# Patient Record
Sex: FEMALE | Race: BLACK OR AFRICAN AMERICAN | NOT HISPANIC OR LATINO | Employment: FULL TIME | URBAN - METROPOLITAN AREA
[De-identification: names, ages, dates, MRNs, and addresses within clinical notes are randomized per-mention and may not be internally consistent; named-entity substitution may affect disease eponyms.]

---

## 2017-10-02 ENCOUNTER — HOSPITAL ENCOUNTER (EMERGENCY)
Facility: HOSPITAL | Age: 27
Discharge: HOME | End: 2017-10-02
Payer: COMMERCIAL

## 2017-10-02 VITALS
HEART RATE: 83 BPM | TEMPERATURE: 98 F | DIASTOLIC BLOOD PRESSURE: 75 MMHG | RESPIRATION RATE: 20 BRPM | HEIGHT: 65 IN | OXYGEN SATURATION: 100 % | BODY MASS INDEX: 32.49 KG/M2 | SYSTOLIC BLOOD PRESSURE: 120 MMHG | WEIGHT: 195 LBS

## 2017-10-02 DIAGNOSIS — R07.9 CHEST PAIN, UNSPECIFIED TYPE: Primary | ICD-10-CM

## 2017-10-02 DIAGNOSIS — R06.02 SHORTNESS OF BREATH: ICD-10-CM

## 2017-10-02 PROCEDURE — 99283 EMERGENCY DEPT VISIT LOW MDM: CPT

## 2017-10-02 NOTE — ED PROVIDER NOTES
Encounter Date: 10/2/2017       History     Chief Complaint   Patient presents with    Panic Attack     27 year old female presents to ed cc of anxiety attack. Patient states multiple syptoms related to anxiety including chest pain/ change in appetitte/ n/v     Christianne Faria is a 27 y.o. F who  has a past medical history of Asthma..    Patient presents to ED via POV with boyfriend and sister due to chest pain. Patient states symptoms started 3 days ago while driving. She began having chest pain, palpitations, shortness of breath, and facial flushing. Her symptoms resolved, but she presented to an [facility] ED for evaluation. She reports she had labs and x-rays done and was given RX for Zantac for possible GERD. She states the medication helped initially for 2 days, but states today her symptoms returned a few hours ago, described as similar chest pain, palpitations, tingling in her hands and feet, and flushing. She reports being stressed all the time from work. She denies prior anxiety attacks or episodes. She reports major improvement currently, but does endorse mild persistent dull chest/epigastric pain. She reports having a PCP appointment tomorrow but presents to ED requesting an answer for what is causing her symptoms.            Review of patient's allergies indicates:   Allergen Reactions    Eggs [egg derived] Diarrhea     Past Medical History:   Diagnosis Date    Asthma      Past Surgical History:   Procedure Laterality Date    BUNIONECTOMY Right 2009     History reviewed. No pertinent family history.  Social History   Substance Use Topics    Smoking status: Never Smoker    Smokeless tobacco: Never Used    Alcohol use Yes     Review of Systems   Constitutional: Negative for chills and fever.   HENT: Negative for sore throat.    Respiratory: Positive for chest tightness and shortness of breath. Negative for cough.    Cardiovascular: Positive for chest pain.   Gastrointestinal: Positive for abdominal pain  and nausea. Negative for constipation, diarrhea and vomiting.   Genitourinary: Negative for dysuria, frequency and urgency.   Musculoskeletal: Positive for myalgias. Negative for back pain.   Skin: Positive for rash (ear flushing, resolved). Negative for wound.   Neurological: Positive for light-headedness. Negative for dizziness, syncope and weakness.   Hematological: Does not bruise/bleed easily.   Psychiatric/Behavioral: Negative for agitation, behavioral problems and confusion.       Physical Exam     Initial Vitals [10/02/17 1650]   BP Pulse Resp Temp SpO2   (!) 144/75 76 20 98.4 °F (36.9 °C) 100 %      MAP       98         Physical Exam    Nursing note and vitals reviewed.  Constitutional: She appears well-developed and well-nourished. She is not diaphoretic. No distress.   Calm, well-appearing   HENT:   Head: Normocephalic and atraumatic.   Mouth/Throat: Oropharynx is clear and moist.   Eyes: EOM are normal. Pupils are equal, round, and reactive to light.   Neck: No tracheal deviation present.   Cardiovascular: Normal rate, regular rhythm, normal heart sounds and intact distal pulses.   Pulmonary/Chest: Breath sounds normal. No stridor. No respiratory distress. She has no wheezes.   Abdominal: Soft. Bowel sounds are normal. She exhibits no distension and no mass. There is no tenderness.   Musculoskeletal: Normal range of motion. She exhibits no edema.   Neurological: She is alert and oriented to person, place, and time. She has normal strength. No cranial nerve deficit or sensory deficit.   Skin: Skin is warm and dry. Capillary refill takes less than 2 seconds. No pallor.   Psychiatric: Her behavior is normal. Judgment and thought content normal. Her mood appears not anxious. Her affect is blunt. Cognition and memory are normal. She does not exhibit a depressed mood.         ED Course   Procedures  Labs Reviewed - No data to display          Medical Decision Making:   Initial Assessment:   28 yo F PMH asthma  presents to ED after episode of CP, SOB, palpitations, and flushing, that resolved spontaneously prior to arrival.   Differential Diagnosis:   Differential Diagnosis:  ACS/MI, PE, aortic dissection, pneumothorax, cardiac tamponade, pericarditis/myocarditis, pneumonia, infection/abscess, lung mass, trauma/fracture, costochondritis/pleurisy, MSK pain, GERD, biliary disease, pancreatitis  ED Management:  On initial evaluation, vitals WNL and patient well-appearing. Offered patient cardiac workup, including CXR, labs, but patient declined, stating she has had all of those tests dose 3 days ago and did not wish to go through with them again today. She only wants an answer as to what is causing her symptoms. When counseled that during the course of a single ED visit a definite answer was unlikely, the patient requested D/C immediately, prior to obtaining any diagnostics. At this time, the patient is hemodynamically stable, and there is no indication to necessitate medical evaluation/treatment at this time. The patient is alert/oriented and appropriate, and she is capable of making her own medical decisions. She was allowed to leave the ED in favor of scheduled PCP evaluation tomorrow. Patient and family were counseled and given ED return precautions, including return or worsening of symptoms, or any other concerns.                   ED Course      Clinical Impression:   The primary encounter diagnosis was Chest pain, unspecified type. A diagnosis of Shortness of breath was also pertinent to this visit.                           Jefferson Cano MD  10/03/17 0178

## 2017-10-02 NOTE — ED NOTES
APPEARANCE: Alert, oriented and in no acute distress.  CARDIAC: Normal rate and rhythm, no murmur heard.   PERIPHERAL VASCULAR: peripheral pulses present. Normal cap refill. No edema. Warm to touch.    RESPIRATORY:Normal rate and effort, breath sounds clear bilaterally throughout chest. Respirations are equal and unlabored no obvious signs of distress.  GASTRO: soft, bowel sounds normal, no tenderness, no abdominal distention. Mid sternal chest pain and nausea x 4 days   MUSC: Full ROM. No bony tenderness or soft tissue tenderness. No obvious deformity. Bilateral lower extremity numbness  SKIN: Skin is warm and dry, normal skin turgor, mucous membranes moist.  NEURO: 5/5 strength major flexors/extensors bilaterally. Sensory intact to light touch bilaterally. [place] coma scale: eyes open spontaneously-4, oriented & converses-5, obeys commands-6. No neurological abnormalities.   MENTAL STATUS: awake, alert and aware of environment.  EYE: PERRL, both eyes: pupils brisk and reactive to light. Normal size.  ENT: EARS: no obvious drainage. NOSE: no active bleeding.

## 2017-10-02 NOTE — ED TRIAGE NOTES
Pt presents to ED today c/o mid sternal chest pain, nausea , anxiety, light headed , dizziness, and numbness in both legs x 4 days. Pt reports she was seen here in ED on Friday and was given rx for zantac with no relie.

## 2017-10-05 ENCOUNTER — OFFICE VISIT (OUTPATIENT)
Dept: FAMILY MEDICINE | Facility: CLINIC | Age: 27
End: 2017-10-05
Payer: COMMERCIAL

## 2017-10-05 ENCOUNTER — LAB VISIT (OUTPATIENT)
Dept: LAB | Facility: HOSPITAL | Age: 27
End: 2017-10-05
Attending: FAMILY MEDICINE
Payer: COMMERCIAL

## 2017-10-05 VITALS
HEART RATE: 70 BPM | SYSTOLIC BLOOD PRESSURE: 102 MMHG | WEIGHT: 198.19 LBS | BODY MASS INDEX: 33.02 KG/M2 | OXYGEN SATURATION: 97 % | HEIGHT: 65 IN | DIASTOLIC BLOOD PRESSURE: 60 MMHG

## 2017-10-05 DIAGNOSIS — Z01.419 WELL WOMAN EXAM: Primary | ICD-10-CM

## 2017-10-05 DIAGNOSIS — Z01.419 WELL WOMAN EXAM: ICD-10-CM

## 2017-10-05 DIAGNOSIS — K21.9 GASTROESOPHAGEAL REFLUX DISEASE, ESOPHAGITIS PRESENCE NOT SPECIFIED: ICD-10-CM

## 2017-10-05 DIAGNOSIS — F41.9 ANXIETY: ICD-10-CM

## 2017-10-05 DIAGNOSIS — F32.A DEPRESSION, UNSPECIFIED DEPRESSION TYPE: ICD-10-CM

## 2017-10-05 DIAGNOSIS — Z23 NEED FOR INFLUENZA VACCINATION: ICD-10-CM

## 2017-10-05 LAB
CHOLEST SERPL-MCNC: 134 MG/DL
CHOLEST/HDLC SERPL: 4.1 {RATIO}
ESTIMATED AVG GLUCOSE: 97 MG/DL
HBA1C MFR BLD HPLC: 5 %
HDLC SERPL-MCNC: 33 MG/DL
HDLC SERPL: 24.6 %
LDLC SERPL CALC-MCNC: 84 MG/DL
NONHDLC SERPL-MCNC: 101 MG/DL
TRIGL SERPL-MCNC: 85 MG/DL
TSH SERPL DL<=0.005 MIU/L-ACNC: 0.95 UIU/ML

## 2017-10-05 PROCEDURE — 99202 OFFICE O/P NEW SF 15 MIN: CPT | Mod: S$GLB,,, | Performed by: FAMILY MEDICINE

## 2017-10-05 PROCEDURE — 83036 HEMOGLOBIN GLYCOSYLATED A1C: CPT

## 2017-10-05 PROCEDURE — 84443 ASSAY THYROID STIM HORMONE: CPT

## 2017-10-05 PROCEDURE — 99999 PR PBB SHADOW E&M-EST. PATIENT-LVL III: CPT | Mod: PBBFAC,,, | Performed by: FAMILY MEDICINE

## 2017-10-05 PROCEDURE — 99385 PREV VISIT NEW AGE 18-39: CPT | Mod: 25,1D,GY,S$GLB | Performed by: FAMILY MEDICINE

## 2017-10-05 PROCEDURE — 80061 LIPID PANEL: CPT

## 2017-10-05 PROCEDURE — 36415 COLL VENOUS BLD VENIPUNCTURE: CPT | Mod: PO

## 2017-10-05 RX ORDER — SERTRALINE HYDROCHLORIDE 50 MG/1
50 TABLET, FILM COATED ORAL DAILY
Qty: 30 TABLET | Refills: 3 | Status: SHIPPED | OUTPATIENT
Start: 2017-10-05 | End: 2017-11-02 | Stop reason: SDUPTHER

## 2017-10-05 NOTE — ASSESSMENT & PLAN NOTE
Reviewed CBC and Bmp from ED visit  PHQ9: positive for depression  Declined flu and tdap  Lipid panel and Tsh today  zoloft

## 2017-10-05 NOTE — PATIENT INSTRUCTIONS
Treating Anxiety Disorders with Medicine  An anxiety disorder can make you feel nervous or apprehensive, even without a clear reason. In people age 65 and older, generalized anxiety disorder is one of the most commonly diagnosed anxiety disorders. Many times it occurs with depression. Certain anxiety disorders can cause intense feelings of fear or panic. You may even have physical symptoms such as a racing heartbeat, sweating, or dizziness. If you have these feelings, you dont have to suffer anymore. Treatment to help you overcome your fears will likely include therapy (also called counseling). Medicine may also be prescribed to help control your symptoms.    Medicines  Certain medicines may be prescribed to help control your symptoms. So you may feel less anxious. You may also feel able to move forward with therapy. At first, medicines and dosages may need to be adjusted to find what works best for you. Try to be patient. Tell your healthcare provider how a medicine makes you feel. This way, you can work together to find the treatment thats best for you. Keep in mind that medicines can have side effects. Talk with your provider about any side effects that are bothering you. Changing the dose or type of medicine may help. Dont stop taking medicine on your own. That can cause symptoms to come back.  · Anti-anxiety medicine. This medicine eases symptoms and helps you relax. Your healthcare provider will explain when and how to use it. It may be prescribed for use before situations that make you anxious. You may also be told to take medicine on a regular schedule. Anti-anxiety medicine may make you feel a little sleepy or out of it. Dont drive a car or operate machinery while on this medicine, until you know how it affects you.  Caution  Never use alcohol or other drugs with anti-anxiety medicines. This could result in loss of muscular control, sedation, coma, or death. Also, use only the amount of medicine  prescribed for you. If you think you may have taken too much, get emergency care right away.   · Antidepressant medicine. This kind of medicine is often used to treat anxiety, even if you arent depressed. An antidepressant helps balance out brain chemicals. This helps keep anxiety under control. This medicine is taken on a schedule. It takes a few weeks to start working. If you dont notice a change at first, you may just need more time. But if you dont notice results after the first few weeks, tell your provider.  Keep taking medicines as prescribed  Never change your dosage, share or use another person's medicine, or stop taking your medicines without talking to your healthcare provider first. Keep the following in mind:  · Some medicines must be taken on a schedule. Make this part of your daily routine. For instance, always take your pill before brushing your teeth. A pillbox can help you remember if youve taken your medicine each day.  · Medicines are often taken for 6 to 12 months. Your healthcare provider will then evaluate whether you need to stay on them. Many people who have also had therapy may no longer need medicine to manage anxiety.  · You may need to stop taking medicine slowly to give your body time to adjust. When its time to stop, your healthcare provider will tell you more. Remember: Never stop taking your medicine without talking to your provider first.  · If symptoms return, you may need to start taking medicines again. This isnt your fault. Its just the nature of your anxiety disorder.  Special concerns  · Side effects. Medicines may cause side effects. Ask your healthcare provider or pharmacist what you can expect. They may have ideas for avoiding some side effects.  · Sexual problems. Some antidepressants can affect your desire for sex or your ability to have an orgasm. A change in dosage or medicine often solves the problem. If you have a sexual side effect that concerns you, tell your  healthcare provider.  · Addiction. If youve never had a problem with drugs or alcohol, you may not have a problem with medicines used to treat anxiety disorders. But always discuss the medicines with your healthcare provider before taking them. If you have a history of addiction, you may not be able to use certain medicines used to treat anxiety disorders.  · Medicine interactions. Always check with your pharmacist before using any over-the-counter medicines, including herbal supplements.   Date Last Reviewed: 5/1/2017 © 2000-2017 [employer]. [address] [place] [address]. All rights reserved. This information is not intended as a substitute for professional medical care. Always follow your healthcare professional's instructions.    Depression: Tips to Help Yourself  As your healthcare providers help treat your depression, you can also help yourself. Keep in mind that your illness affects you emotionally, physically, mentally, and socially. So full recovery will take time. Take care of your body and your soul, and be patient with yourself as you get better.    Self-care  · Educate yourself. Read about treatment and medicine options. If you have the energy, attend local conferences or support groups. Keep a list of useful websites and helpful books and use them as needed. This illness is not your fault. Dont blame yourself for your depression.  · Manage early symptoms. If you notice symptoms returning, experience triggers, or identify other factors that may lead to a depressive episode, get help as soon as possible. Ask trusted friends and family to monitor your behavior and let you know if they see anything of concern.  · Work with your provider. Find a provider you can trust. Communicate honestly with that person and share information on your treatment for depression and your reaction to medicines.  · Be prepared for a crisis. Know what to do if you experience a crisis. Keep the phone  number of a crisis hotline and know the location of your community's urgent care centers and the closest emergency department.  · Hold off on big decisions. Depression can cloud your judgment. So wait until you feel better before making major life decisions, such as changing jobs, moving, or getting  or .  · Be patient. Recovering from depression is a process. Dont be discouraged if it takes some time to feel better.  · Keep it simple. Depression saps your energy and concentration. So you wont be able to do all the things you used to do. Set small goals and do what you can.  · Be with others. Dont isolate yourself--youll only feel worse. Try to be with other people. And take part in fun activities when you can. Go to a movie, ballgame, [employer] service, or social event. Talk openly with people you can trust. And accept help when its offered.  Take care of your body  People with depression often lose the desire to take care of themselves. That only makes their problems worse. During treatment and afterward, make a point to:  · Exercise. Its a great way to take care of your body. And studies have shown that exercise helps fight depression.  · Avoid drugs and alcohol. These may ease the pain in the short term. But theyll only make your problems worse in the long run.  · Get relief from stress. Ask your healthcare provider for relaxation exercises and techniques to help relieve stress.  · Eat right. A balanced and healthy diet helps keep your body healthy.  Date Last Reviewed: 1/1/2017  © 4960-4336 [employer]. [address] [place] [address], [place] [address]. All rights reserved. This information is not intended as a substitute for professional medical care. Always follow your healthcare professional's instructions.

## 2017-10-05 NOTE — ASSESSMENT & PLAN NOTE
No TV in bedroom  Sleep hygiene   Journal - 3 good things  Every bad day is a good day to journal  Families problems are not your own  Counseling visit in the future  [facility]
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
## 2017-11-02 ENCOUNTER — OFFICE VISIT (OUTPATIENT)
Dept: FAMILY MEDICINE | Facility: CLINIC | Age: 27
End: 2017-11-02
Payer: COMMERCIAL

## 2017-11-02 VITALS
OXYGEN SATURATION: 99 % | HEART RATE: 92 BPM | DIASTOLIC BLOOD PRESSURE: 58 MMHG | HEIGHT: 65 IN | WEIGHT: 198 LBS | BODY MASS INDEX: 32.99 KG/M2 | SYSTOLIC BLOOD PRESSURE: 119 MMHG

## 2017-11-02 DIAGNOSIS — R51.9 ACUTE NONINTRACTABLE HEADACHE, UNSPECIFIED HEADACHE TYPE: ICD-10-CM

## 2017-11-02 DIAGNOSIS — F41.9 ANXIETY: Primary | ICD-10-CM

## 2017-11-02 DIAGNOSIS — F32.A DEPRESSION, UNSPECIFIED DEPRESSION TYPE: ICD-10-CM

## 2017-11-02 PROCEDURE — 99214 OFFICE O/P EST MOD 30 MIN: CPT | Mod: S$GLB,,, | Performed by: FAMILY MEDICINE

## 2017-11-02 PROCEDURE — 99999 PR PBB SHADOW E&M-EST. PATIENT-LVL III: CPT | Mod: PBBFAC,,, | Performed by: FAMILY MEDICINE

## 2017-11-02 RX ORDER — FLUTICASONE PROPIONATE 50 MCG
1 SPRAY, SUSPENSION (ML) NASAL DAILY
Qty: 1 BOTTLE | Refills: 11 | Status: SHIPPED | OUTPATIENT
Start: 2017-11-02 | End: 2017-12-02

## 2017-11-02 RX ORDER — SERTRALINE HYDROCHLORIDE 50 MG/1
50 TABLET, FILM COATED ORAL DAILY
Qty: 90 TABLET | Refills: 1 | Status: SHIPPED | OUTPATIENT
Start: 2017-11-02 | End: 2017-12-21

## 2017-11-02 RX ORDER — OXYMETAZOLINE HCL 0.05 %
1 SPRAY, NON-AEROSOL (ML) NASAL 2 TIMES DAILY
Qty: 30 ML | Refills: 0 | Status: SHIPPED | OUTPATIENT
Start: 2017-11-02 | End: 2017-11-05

## 2017-11-02 NOTE — PROGRESS NOTES
"Subjective:       Patient ID: Christianne Faria is a 27 y.o. female.    Chief Complaint: Follow-up; Concerns About Ocular Health; and Migraine    Patient is following up today for her anxiety. She has been on the zoloft 50 mg. She has been on it for a month. Her anxiety has decreased along with her obsessions  but she has had some sexual side effects. She has been doing the journaling and the reading along with the sleepy hygiene and that has helped. Has only had one panic attack in the last month, and this was manageable. She reports a 50% improvement in her symptoms.     She has had a "migraine" for about 6 days now. She reports that the headache is on her whole head. No trauma. This seems to have started when she was around some children over the weekend. She reports photophobia and phonophobia along with some nausea.         Migraine    This is a new problem. The current episode started in the past 7 days. The problem has been waxing and waning. The pain is located in the vertex and temporal region. The pain quality is similar to prior headaches. Associated symptoms include nausea, neck pain, phonophobia, photophobia and rhinorrhea. Pertinent negatives include no fever, hearing loss, insomnia, visual change, vomiting or weakness. Exacerbated by: light. She has tried NSAIDs for the symptoms. The treatment provided moderate relief. Her past medical history is significant for migraines in the family. There is no history of migraine headaches.   Anxiety   Presents for follow-up visit. Symptoms include depressed mood, irritability and nausea. Patient reports no chest pain, compulsions, confusion, insomnia, nervous/anxious behavior, obsessions or palpitations. The severity of symptoms is interfering with daily activities. The quality of sleep is good.     Compliance with medications is %. Treatment side effects: sexual side effects.     Review of Systems   Constitutional: Positive for irritability. Negative for " activity change, fever and unexpected weight change.   HENT: Positive for rhinorrhea and trouble swallowing. Negative for hearing loss.    Eyes: Positive for photophobia. Negative for discharge and visual disturbance.   Respiratory: Negative for chest tightness and wheezing.    Cardiovascular: Negative for chest pain and palpitations.   Gastrointestinal: Positive for nausea. Negative for blood in stool, constipation, diarrhea and vomiting.   Endocrine: Negative for polydipsia and polyuria.   Genitourinary: Negative for difficulty urinating, dysuria, hematuria and menstrual problem.   Musculoskeletal: Positive for neck pain. Negative for arthralgias and joint swelling.   Neurological: Positive for headaches. Negative for weakness.   Psychiatric/Behavioral: Negative for confusion and dysphoric mood. The patient is not nervous/anxious and does not have insomnia.         Objective:     Vitals:    11/02/17 1401   BP: (!) 119/58   Pulse: 92        Physical Exam   Constitutional: She is oriented to person, place, and time. She appears well-developed and well-nourished.   HENT:   Head: Normocephalic and atraumatic.   Nasal congestion noted BL  Mild pharyngeal erythema without exudate noted   Eyes: Conjunctivae and EOM are normal. Pupils are equal, round, and reactive to light.   Neck: Normal range of motion. Neck supple.   Cardiovascular: Normal rate and regular rhythm.    Pulmonary/Chest: Effort normal and breath sounds normal. No respiratory distress. She has no wheezes. She has no rales.   Abdominal: Soft. She exhibits no distension. There is no tenderness.   Musculoskeletal:   Ttp noted at occiput   Neurological: She is alert and oriented to person, place, and time.   Skin: Skin is warm and dry.   Psychiatric: She has a normal mood and affect. Her behavior is normal. Judgment and thought content normal.   Nursing note and vitals reviewed.      Assessment:       1. Anxiety    2. Depression, unspecified depression type     3. Acute nonintractable headache, unspecified headache type        Plan:       Plan:  Patient with history of Anxiety and depression  Patient reports 50 % improvement  Biggest side effect is the sexual side effect, patient counseled that SSRI's sexual side effects may persist for about 8-12 weeks but then may resolve.   She declines increase in dose to 100 mg but wants to continue with 50 mg for now  Continue No TV in bedroom, Sleep hygiene, Journal - 3 good things, SSRI  No suicidal or homicidal ideations  If side effects persist, can try fluoxetine vs bupropion    Headache appears to be a tension type headache made worse but acute URI  Advised breathing exercises along with NSAIDS/Tylenol for headache  Discussed diagnosis and treatment of URI.  flonase and afrin (3 days only) for URI  Warm teas, salt water gargles  Call in 3 days if symptoms aren't resolving.  Follow up with PCP as needed    Warning signs discussed, patient to call with any further issues or worsening of symptoms.

## 2017-12-21 ENCOUNTER — OFFICE VISIT (OUTPATIENT)
Dept: FAMILY MEDICINE | Facility: CLINIC | Age: 27
End: 2017-12-21
Payer: COMMERCIAL

## 2017-12-21 VITALS
HEIGHT: 65 IN | WEIGHT: 198.44 LBS | BODY MASS INDEX: 33.06 KG/M2 | SYSTOLIC BLOOD PRESSURE: 94 MMHG | DIASTOLIC BLOOD PRESSURE: 60 MMHG | TEMPERATURE: 98 F | OXYGEN SATURATION: 99 % | HEART RATE: 77 BPM

## 2017-12-21 DIAGNOSIS — R44.1 VISUAL HALLUCINATION: Primary | ICD-10-CM

## 2017-12-21 DIAGNOSIS — F32.A DEPRESSION, UNSPECIFIED DEPRESSION TYPE: ICD-10-CM

## 2017-12-21 DIAGNOSIS — F41.9 ANXIETY: ICD-10-CM

## 2017-12-21 PROBLEM — Z23 NEED FOR INFLUENZA VACCINATION: Status: RESOLVED | Noted: 2017-10-05 | Resolved: 2017-12-21

## 2017-12-21 PROBLEM — Z01.419 WELL WOMAN EXAM: Status: RESOLVED | Noted: 2017-10-05 | Resolved: 2017-12-21

## 2017-12-21 PROCEDURE — 99214 OFFICE O/P EST MOD 30 MIN: CPT | Mod: S$GLB,,, | Performed by: FAMILY MEDICINE

## 2017-12-21 PROCEDURE — 99999 PR PBB SHADOW E&M-EST. PATIENT-LVL IV: CPT | Mod: PBBFAC,,, | Performed by: FAMILY MEDICINE

## 2017-12-21 RX ORDER — SERTRALINE HYDROCHLORIDE 100 MG/1
100 TABLET, FILM COATED ORAL DAILY
Qty: 90 TABLET | Refills: 0 | Status: SHIPPED | OUTPATIENT
Start: 2017-12-21 | End: 2018-01-04 | Stop reason: SDUPTHER

## 2017-12-21 RX ORDER — IBUPROFEN 200 MG
200 TABLET ORAL EVERY 6 HOURS PRN
COMMUNITY
End: 2019-01-30
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
## 2017-12-21 NOTE — PATIENT INSTRUCTIONS
If you or someone you know is thinking about suicide, call the National Suicide Prevention Lifeline. 0-476-110-TALK (6128)    No TV in bedroom  Sleep hygiene   Exercise 3 x/week  Make plans at least once a week with a friend  Journal - 3 good things EVERY SINGLE NIGHT  Every bad day is a good day to journal  Families problems are not your own  Counseling visit in the future  Increase SSRI

## 2018-01-04 ENCOUNTER — OFFICE VISIT (OUTPATIENT)
Dept: FAMILY MEDICINE | Facility: CLINIC | Age: 28
End: 2018-01-04
Payer: COMMERCIAL

## 2018-01-04 VITALS
BODY MASS INDEX: 34.31 KG/M2 | HEIGHT: 65 IN | DIASTOLIC BLOOD PRESSURE: 62 MMHG | TEMPERATURE: 98 F | OXYGEN SATURATION: 98 % | SYSTOLIC BLOOD PRESSURE: 90 MMHG | HEART RATE: 94 BPM | WEIGHT: 205.94 LBS

## 2018-01-04 DIAGNOSIS — F32.A DEPRESSION, UNSPECIFIED DEPRESSION TYPE: ICD-10-CM

## 2018-01-04 DIAGNOSIS — F41.9 ANXIETY: ICD-10-CM

## 2018-01-04 PROCEDURE — 99999 PR PBB SHADOW E&M-EST. PATIENT-LVL III: CPT | Mod: PBBFAC,,, | Performed by: FAMILY MEDICINE

## 2018-01-04 PROCEDURE — 99214 OFFICE O/P EST MOD 30 MIN: CPT | Mod: S$GLB,,, | Performed by: FAMILY MEDICINE

## 2018-01-04 RX ORDER — FLUTICASONE PROPIONATE 50 MCG
SPRAY, SUSPENSION (ML) NASAL
COMMUNITY
Start: 2017-12-29 | End: 2019-08-30

## 2018-01-04 RX ORDER — SERTRALINE HYDROCHLORIDE 100 MG/1
100 TABLET, FILM COATED ORAL DAILY
Qty: 90 TABLET | Refills: 1 | Status: SHIPPED | OUTPATIENT
Start: 2018-01-04 | End: 2018-03-26 | Stop reason: SDUPTHER

## 2018-01-04 NOTE — PROGRESS NOTES
Subjective:       Patient ID: Christianne Faria is a 27 y.o. female.    Chief Complaint: Follow-up (Anxiety)    Christianne is a 27 y.o. female who presents today with follow up for dysphoric mood and anxiety. She is feeling better today. She is here with her boyfriend again. She saw her family over christmas including her brother over christmas and her sister on christmas gaby. She went to the park and played [employer] and had a lot of fun. She received a present from her boyfriend for christmas. They also spent new years together and had fun, even though they did not go out. She has been journaling 3-4 x /week and has found it very helpful. She reports an 80% IMPROVEMENT in her symptoms. She is tolerating the 100 mg of zoloft qhs very well. She has increased her ways of expressing herself; she is painting and dancing and finds great enjoyment in both of those things. She is sleeping well. She DENIES any suicidal thoughts or hallucinations currently. She has not made her appointment with psychiatry yet.       Anxiety   Presents for follow-up visit. Symptoms include compulsions, irritability and nervous/anxious behavior. Patient reports no chest pain, confusion, decreased concentration, dizziness, excessive worry, hyperventilation, impotence, insomnia, nausea, obsessions, palpitations, panic, restlessness, shortness of breath (7) or suicidal ideas. Symptoms occur most days. The severity of symptoms is moderate. The quality of sleep is fair. Nighttime awakenings: occasional.     Compliance with medications is %.     Review of Systems   Constitutional: Positive for irritability. Negative for activity change and unexpected weight change.   HENT: Positive for rhinorrhea. Negative for hearing loss and trouble swallowing.    Eyes: Negative for discharge and visual disturbance.   Respiratory: Negative for chest tightness, shortness of breath (7) and wheezing.    Cardiovascular: Negative for chest pain and palpitations.    Gastrointestinal: Negative for blood in stool, constipation, diarrhea, nausea and vomiting.   Endocrine: Negative for polydipsia and polyuria.   Genitourinary: Negative for difficulty urinating, dysuria, hematuria, impotence and menstrual problem.   Musculoskeletal: Negative for arthralgias, joint swelling and neck pain.   Neurological: Negative for dizziness, weakness and headaches.   Psychiatric/Behavioral: Positive for dysphoric mood. Negative for confusion, decreased concentration and suicidal ideas. The patient is nervous/anxious. The patient does not have insomnia.        Objective:     Vitals:    01/04/18 1350   BP: 90/62   Pulse: 94   Temp: 97.6 °F (36.4 °C)        Physical Exam   Constitutional: She is oriented to person, place, and time. She appears well-developed and well-nourished.   HENT:   Head: Normocephalic and atraumatic.   Cardiovascular: Normal rate and regular rhythm.    Pulmonary/Chest: Effort normal and breath sounds normal.   Neurological: She is alert and oriented to person, place, and time.   Psychiatric: She has a normal mood and affect. Her behavior is normal. Judgment and thought content normal.   Nursing note and vitals reviewed.      Assessment:       1. Anxiety    2. Depression, unspecified depression type        Plan:       Patient is presenting for follow up from visit prior to [place] at which time she was states that she has worsening depression/anxiety and visual hallucinations. She reports an 80% improvement in her symptoms today. She is to continue zoloft 100 mg. She is to make an appointment with psych to see if they believe anything else needs to be done given the hallucinations that she had in the past. Boyfriend was with her at the appointment today and verifies the information that was given.     No TV in bedroom  Sleep hygiene   Exercise 3 x/week  Make plans at least once a week with a friend  Journal - 3 good things EVERY SINGLE NIGHT  Every bad day is a good day to  journal  Continue painting/dancing/other ways of expression  Families problems are not your own  Counseling visit in the future  Continue SSRI     Ddx include depression with psychotic features vs bipolar vs schizoaffective vs other. Psych input is appreciated    Anxiety/Depression, unspecified depression type  -     sertraline (ZOLOFT) 100 MG tablet; Take 1 tablet (100 mg total) by mouth once daily.  Dispense: 90 tablet; Refill: 1      35 minutes spent with patient, of which >50% was spent on counseling and coordination of care.

## 2018-01-04 NOTE — PATIENT INSTRUCTIONS
If you or someone you know is thinking about suicide, call the National Suicide Prevention Lifeline. 4-587-218-TALK (9146)     No TV in bedroom  Sleep hygiene   Exercise 3 x/week  Make plans at least once a week with a friend  Journal - 3 good things EVERY SINGLE NIGHT  Every bad day is a good day to journal  Families problems are not your own  Counseling visit in the future  Continue SSRI

## 2018-02-08 ENCOUNTER — OFFICE VISIT (OUTPATIENT)
Dept: FAMILY MEDICINE | Facility: CLINIC | Age: 28
End: 2018-02-08
Payer: COMMERCIAL

## 2018-02-08 VITALS
OXYGEN SATURATION: 97 % | SYSTOLIC BLOOD PRESSURE: 98 MMHG | DIASTOLIC BLOOD PRESSURE: 62 MMHG | HEART RATE: 75 BPM | HEIGHT: 65 IN | WEIGHT: 204.56 LBS | BODY MASS INDEX: 34.08 KG/M2

## 2018-02-08 DIAGNOSIS — F32.A DEPRESSION, UNSPECIFIED DEPRESSION TYPE: ICD-10-CM

## 2018-02-08 DIAGNOSIS — G47.00 INSOMNIA, UNSPECIFIED TYPE: ICD-10-CM

## 2018-02-08 DIAGNOSIS — F41.9 ANXIETY: Primary | ICD-10-CM

## 2018-02-08 PROCEDURE — 99213 OFFICE O/P EST LOW 20 MIN: CPT | Mod: PO | Performed by: FAMILY MEDICINE

## 2018-02-08 PROCEDURE — 3008F BODY MASS INDEX DOCD: CPT | Mod: S$GLB,,, | Performed by: FAMILY MEDICINE

## 2018-02-08 PROCEDURE — 99213 OFFICE O/P EST LOW 20 MIN: CPT | Mod: S$GLB,,, | Performed by: FAMILY MEDICINE

## 2018-02-08 PROCEDURE — 99999 PR PBB SHADOW E&M-EST. PATIENT-LVL III: CPT | Mod: PBBFAC,,, | Performed by: FAMILY MEDICINE

## 2018-02-08 NOTE — PATIENT INSTRUCTIONS
No TV in bedroom  Sleep hygiene   Exercise 3 x/week  Make plans at least once a week with a friend  Journal - 3 good things  Every bad day is a good day to journal  Families problems are not your own  Counseling visit in the future  Continue SSRI

## 2018-02-08 NOTE — PROGRESS NOTES
Subjective:       Patient ID: Christianne Faria is a 27 y.o. female.    Chief Complaint: Follow-up (1 mo) and Anxiety    27-year-old female presents today as follow-up for anxiety.  She appears to be back to her baseline mood and behavior.  She is here today with her boyfriend who is coming to the last 2 appointments with her.  She was seen before Christmas and was feeling very depressed.  Since then her mood has improved and she has continued to have hobbies such as painting that are bringing liam to her life.  She feels like she is happier and is better at making decisions with her counseling and her medications.  Her boyfriend and is in agreement with that.  She still has some irritability.    She is still having some hallucinations.  She states that she is not hearing stings or seeing people but she states that she feels like she sees things move when they're not moving.  Example she gives is that the sink in the room can appear to move to her even though she knows that it is a sink and it is incapable of moving.  She has an appointment with psychiatry already in the books for March 26.      Anxiety   Presents for follow-up visit. Symptoms include insomnia and nervous/anxious behavior. Patient reports no chest pain, confusion, decreased concentration, depressed mood, excessive worry, hyperventilation, irritability, palpitations, panic, restlessness or suicidal ideas. Symptoms occur occasionally. The severity of symptoms is mild. The quality of sleep is fair. Nighttime awakenings: one to two.     Compliance with medications is %.     Review of Systems   Constitutional: Negative for activity change, irritability and unexpected weight change.   HENT: Negative for hearing loss, rhinorrhea and trouble swallowing.    Eyes: Negative for discharge and visual disturbance.   Respiratory: Negative for chest tightness and wheezing.    Cardiovascular: Negative for chest pain and palpitations.   Gastrointestinal: Negative  for blood in stool, constipation, diarrhea and vomiting.   Endocrine: Negative for polydipsia and polyuria.   Genitourinary: Negative for difficulty urinating, dysuria, hematuria and menstrual problem.   Musculoskeletal: Negative for arthralgias and joint swelling.   Neurological: Negative for weakness and headaches.   Psychiatric/Behavioral: Negative for confusion, decreased concentration, dysphoric mood and suicidal ideas. The patient is nervous/anxious and has insomnia.          Objective:     Vitals:    02/08/18 1405   BP: 98/62   Pulse: 75        Physical Exam   Constitutional: She is oriented to person, place, and time. She appears well-developed and well-nourished.   HENT:   Head: Normocephalic and atraumatic.   Nose: Nose normal.   Mouth/Throat: Oropharynx is clear and moist.   Eyes: Conjunctivae and EOM are normal. Pupils are equal, round, and reactive to light.   Neck: Normal range of motion. Neck supple.   Cardiovascular: Normal rate and regular rhythm.    Pulmonary/Chest: Effort normal and breath sounds normal.   Abdominal: Soft.   obese   Neurological: She is alert and oriented to person, place, and time. No cranial nerve deficit. Coordination normal.   Finger to nose intact  Heel to shin intact  Strength and sensation grossly intract BL UE/LE  CN 2-12 grossly intact  PERRLA  Rapid alternating movement intact   Psychiatric: She has a normal mood and affect. Her behavior is normal. Judgment and thought content normal.   Nursing note and vitals reviewed.      Assessment:       1. Anxiety    2. Depression, unspecified depression type    3. Insomnia, unspecified type        Plan:       Patient is presenting as second follow up from visit prior to [place] at which time she was states that she has worsening depression/anxiety and visual hallucinations. She is significantly better today. She is to continue zoloft 100 mg. She was counseled again on the importance of sleep hygiene, deep breathing, reflecting  not reacting, and journaling.     No TV in bedroom  Sleep hygiene   Exercise 3 x/week  Make plans at least once a week with a friend  Journal - 3 good things EVERY SINGLE NIGHT  Every bad day is a good day to journal  Continue painting/dancing/other ways of expression  Families problems are not your own  Counseling visit in the future  Continue SSRI     Ddx include depression with psychotic features vs bipolar vs schizoaffective vs other. Psych input is appreciated    Anxiety    Depression, unspecified depression type    Insomnia, unspecified type            Warning signs discussed, patient to call with any further issues or worsening of symptoms.     Parts of the above note were dictated using a voice dictation software. Please excuse any grammatical or typographical errors.

## 2018-03-26 ENCOUNTER — OFFICE VISIT (OUTPATIENT)
Dept: PSYCHIATRY | Facility: CLINIC | Age: 28
End: 2018-03-26
Payer: COMMERCIAL

## 2018-03-26 VITALS
SYSTOLIC BLOOD PRESSURE: 108 MMHG | DIASTOLIC BLOOD PRESSURE: 64 MMHG | HEART RATE: 78 BPM | BODY MASS INDEX: 34.38 KG/M2 | WEIGHT: 206.56 LBS

## 2018-03-26 DIAGNOSIS — F41.1 GENERALIZED ANXIETY DISORDER: Primary | ICD-10-CM

## 2018-03-26 DIAGNOSIS — F60.5 OBSESSIVE COMPULSIVE PERSONALITY DISORDER: ICD-10-CM

## 2018-03-26 DIAGNOSIS — F41.0 PANIC DISORDER: ICD-10-CM

## 2018-03-26 DIAGNOSIS — F33.41 RECURRENT MAJOR DEPRESSIVE DISORDER, IN PARTIAL REMISSION: ICD-10-CM

## 2018-03-26 PROCEDURE — 99215 OFFICE O/P EST HI 40 MIN: CPT | Mod: S$GLB,,, | Performed by: NURSE PRACTITIONER

## 2018-03-26 PROCEDURE — 99999 PR PBB SHADOW E&M-EST. PATIENT-LVL III: CPT | Mod: PBBFAC,,, | Performed by: NURSE PRACTITIONER

## 2018-03-26 RX ORDER — SERTRALINE HYDROCHLORIDE 100 MG/1
150 TABLET, FILM COATED ORAL DAILY
Qty: 135 TABLET | Refills: 1 | Status: SHIPPED | OUTPATIENT
Start: 2018-03-26 | End: 2018-05-10

## 2018-04-13 ENCOUNTER — TELEPHONE (OUTPATIENT)
Dept: OBSTETRICS AND GYNECOLOGY | Facility: CLINIC | Age: 28
End: 2018-04-13

## 2018-04-13 NOTE — TELEPHONE ENCOUNTER
Attempted to contact pt to inform Dr. Quick will be out of the office until the middle of May. No answer, left detailed message for pt to return call. I rescheduled pt to 5/15 at 11:30am.

## 2018-05-10 ENCOUNTER — OFFICE VISIT (OUTPATIENT)
Dept: FAMILY MEDICINE | Facility: CLINIC | Age: 28
End: 2018-05-10
Payer: COMMERCIAL

## 2018-05-10 VITALS
HEIGHT: 65 IN | WEIGHT: 207.25 LBS | OXYGEN SATURATION: 99 % | HEART RATE: 64 BPM | DIASTOLIC BLOOD PRESSURE: 60 MMHG | SYSTOLIC BLOOD PRESSURE: 98 MMHG | TEMPERATURE: 99 F | BODY MASS INDEX: 34.53 KG/M2

## 2018-05-10 DIAGNOSIS — M54.2 NECK PAIN: ICD-10-CM

## 2018-05-10 DIAGNOSIS — G47.00 INSOMNIA, UNSPECIFIED TYPE: ICD-10-CM

## 2018-05-10 DIAGNOSIS — F41.9 ANXIETY: Primary | ICD-10-CM

## 2018-05-10 DIAGNOSIS — F32.A DEPRESSION, UNSPECIFIED DEPRESSION TYPE: ICD-10-CM

## 2018-05-10 PROCEDURE — 99214 OFFICE O/P EST MOD 30 MIN: CPT | Mod: S$GLB,,, | Performed by: FAMILY MEDICINE

## 2018-05-10 PROCEDURE — 99999 PR PBB SHADOW E&M-EST. PATIENT-LVL IV: CPT | Mod: PBBFAC,,, | Performed by: FAMILY MEDICINE

## 2018-05-10 PROCEDURE — 3008F BODY MASS INDEX DOCD: CPT | Mod: CPTII,S$GLB,, | Performed by: FAMILY MEDICINE

## 2018-05-10 RX ORDER — TIZANIDINE 2 MG/1
4 TABLET ORAL NIGHTLY PRN
Qty: 30 TABLET | Refills: 0 | Status: SHIPPED | OUTPATIENT
Start: 2018-05-10 | End: 2018-05-20

## 2018-05-10 RX ORDER — SERTRALINE HYDROCHLORIDE 100 MG/1
100 TABLET, FILM COATED ORAL DAILY
Qty: 90 TABLET | Refills: 1 | Status: SHIPPED | OUTPATIENT
Start: 2018-05-10 | End: 2018-07-09 | Stop reason: SDUPTHER

## 2018-05-10 NOTE — PROGRESS NOTES
Subjective:       Patient ID: Christianne Faria is a 27 y.o. female.    Chief Complaint: Follow-up (3 mos ) and Anxiety    Christianne is a 27 y.o. female who presents today for follow up on her anxiety, depression, insomnia, and for continue neck pain and shoulder pain.     She is smiling and happy during the appointment today.  She states that she is at the same job, however it is going better.  Even though her job can still be frustrating, her perception has changed and she is enjoying it more.  She continues to have good support.  Her boyfriend, who is usually here with her is at a physical for his work but they are still very involved each other's lives.  She has been journaling nightly and finds that to be very helpful.  She makes plans with friends.  She has been exercising, however only about one time per week.  She goes biking for about 60 minutes.  She has increased confidence.  She has no suicidal or homicidal ideations.    She went to see psychiatry but did not apparently develop a good rapport.  Zoloft was increased to 150 mg by psychiatry but she did not think that she needed to have her dose increased so she was continued on 100 mg.  She is very happy with her symptoms at the 100 mg dose.  She is having minimal sexual side effects although she does have some decreased desire.  Her partner is understanding of this and she does not currently treat wish to change medication    Neck pain is also persisting, for about 2 years now. Unchanged since onset. Sometimes it is in her shoulders.       Anxiety   Presents for follow-up visit. Symptoms include depressed mood, excessive worry, insomnia and nervous/anxious behavior. Patient reports no chest pain, compulsions, confusion, decreased concentration, dry mouth, hyperventilation, irritability, nausea, obsessions, palpitations, panic, restlessness, shortness of breath or suicidal ideas. Symptoms occur occasionally. The severity of symptoms is moderate. The quality of  sleep is fair.     Compliance with medications is %.     Review of Systems   Constitutional: Negative for activity change, irritability and unexpected weight change.   HENT: Negative for hearing loss, rhinorrhea and trouble swallowing.    Eyes: Negative for discharge and visual disturbance.   Respiratory: Negative for chest tightness, shortness of breath and wheezing.    Cardiovascular: Negative for chest pain and palpitations.   Gastrointestinal: Negative for blood in stool, constipation, diarrhea, nausea and vomiting.   Endocrine: Negative for polydipsia and polyuria.   Genitourinary: Negative for difficulty urinating, dysuria, hematuria and menstrual problem.   Musculoskeletal: Positive for neck pain. Negative for arthralgias and joint swelling.   Neurological: Negative for weakness and headaches.   Psychiatric/Behavioral: Negative for confusion, decreased concentration, dysphoric mood and suicidal ideas. The patient is nervous/anxious and has insomnia.        Objective:     Vitals:    05/10/18 1608   BP: 98/60   Pulse: 64   Temp: 98.8 °F (37.1 °C)        Physical Exam   Constitutional: She is oriented to person, place, and time. She appears well-developed and well-nourished. No distress.   HENT:   Head: Normocephalic and atraumatic.   Eyes: Conjunctivae are normal.   Neck: Normal range of motion. Neck supple.   Cardiovascular: Normal rate and regular rhythm.    Pulmonary/Chest: Effort normal and breath sounds normal.   Abdominal: Soft. There is no tenderness.   Musculoskeletal: She exhibits tenderness.   TTP noted at occiput and right first rib   Neurological: She is alert and oriented to person, place, and time.   Skin: She is not diaphoretic.   Psychiatric: She has a normal mood and affect. Her behavior is normal. Judgment and thought content normal.   Nursing note and vitals reviewed.      Assessment:       1. Anxiety    2. Depression, unspecified depression type    3. Insomnia, unspecified type    4.  Neck pain        Plan:       Very pleasant 27 year old female presents today for follow up on her anxiety, depression and insomnia. She is stable and no longer having any issues with hallucinations. She is smiling and happy during the visit today. She is to continue zoloft 100 mg. She was counseled again on the importance of sleep hygiene, deep breathing, reflecting not reacting, and journaling    No TV in bedroom  Sleep hygiene   Exercise 3 x/week  Make plans at least once a week with a friend  Journal - 3 good things EVERY SINGLE NIGHT  Every bad day is a good day to journal  Continue painting/dancing/other ways of expression  Families problems are not your own  Counseling visit in the future  Continue SSRI      Anxiety/Depression, unspecified depression type  -     sertraline (ZOLOFT) 100 MG tablet; Take 1 tablet (100 mg total) by mouth once daily.  Dispense: 90 tablet; Refill: 1    Insomnia, unspecified type  Journal  zoloft qhs  Exercise daily    Neck pain  Given TTP of the right first rib and the right occiput, likely MSK in nature. However, given length of symptoms, will do xr cervical spine.   PT, Tizanidine QHSPRN  Follow up PRN for this  -     Ambulatory Referral to Physical/Occupational Therapy  -     tiZANidine (ZANAFLEX) 2 MG tablet; Take 2 tablets (4 mg total) by mouth nightly as needed.  Dispense: 30 tablet; Refill: 0  -     X-Ray Cervical Spine Complete 5 view; Future; Expected date: 05/10/2018    Warning signs discussed, patient to call with any further issues or worsening of symptoms.     Parts of the above note were dictated using a voice dictation software. Please excuse any grammatical or typographical errors.

## 2018-05-10 NOTE — PATIENT INSTRUCTIONS
No TV in bedroom  Sleep hygiene   Exercise 3 x/week  Make plans at least once a week with a friend  Journal - 3 good things  Every bad day is a good day to journal  Families problems are not your own  Counseling visit in the future  Continue SSRI    Follow up for physical in october

## 2018-05-15 ENCOUNTER — TELEPHONE (OUTPATIENT)
Dept: FAMILY MEDICINE | Facility: CLINIC | Age: 28
End: 2018-05-15

## 2018-05-15 ENCOUNTER — OFFICE VISIT (OUTPATIENT)
Dept: OBSTETRICS AND GYNECOLOGY | Facility: CLINIC | Age: 28
End: 2018-05-15
Payer: COMMERCIAL

## 2018-05-15 ENCOUNTER — HOSPITAL ENCOUNTER (OUTPATIENT)
Dept: RADIOLOGY | Facility: HOSPITAL | Age: 28
Discharge: HOME | End: 2018-05-15
Attending: FAMILY MEDICINE
Payer: COMMERCIAL

## 2018-05-15 VITALS
DIASTOLIC BLOOD PRESSURE: 64 MMHG | BODY MASS INDEX: 34.27 KG/M2 | WEIGHT: 205.94 LBS | SYSTOLIC BLOOD PRESSURE: 110 MMHG

## 2018-05-15 DIAGNOSIS — Z30.09 ENCOUNTER FOR OTHER GENERAL COUNSELING OR ADVICE ON CONTRACEPTION: ICD-10-CM

## 2018-05-15 DIAGNOSIS — M54.2 NECK PAIN: ICD-10-CM

## 2018-05-15 DIAGNOSIS — Z01.419 WELL WOMAN EXAM WITH ROUTINE GYNECOLOGICAL EXAM: Primary | ICD-10-CM

## 2018-05-15 DIAGNOSIS — R10.2 PELVIC PAIN IN FEMALE: ICD-10-CM

## 2018-05-15 DIAGNOSIS — M26.12: Primary | ICD-10-CM

## 2018-05-15 DIAGNOSIS — Z12.4 SCREENING FOR CERVICAL CANCER: ICD-10-CM

## 2018-05-15 PROCEDURE — 88175 CYTOPATH C/V AUTO FLUID REDO: CPT | Performed by: PATHOLOGY

## 2018-05-15 PROCEDURE — 88141 CYTOPATH C/V INTERPRET: CPT | Mod: ,,, | Performed by: PATHOLOGY

## 2018-05-15 PROCEDURE — 99395 PREV VISIT EST AGE 18-39: CPT | Mod: S$GLB,,, | Performed by: OBSTETRICS & GYNECOLOGY

## 2018-05-15 PROCEDURE — 99999 PR PBB SHADOW E&M-EST. PATIENT-LVL III: CPT | Mod: PBBFAC,,, | Performed by: OBSTETRICS & GYNECOLOGY

## 2018-05-15 PROCEDURE — 72050 X-RAY EXAM NECK SPINE 4/5VWS: CPT | Mod: 26,,, | Performed by: RADIOLOGY

## 2018-05-15 PROCEDURE — 72050 X-RAY EXAM NECK SPINE 4/5VWS: CPT | Mod: TC,FY

## 2018-05-15 RX ORDER — NORETHINDRONE 5 MG/1
5 TABLET ORAL DAILY
Qty: 30 TABLET | Refills: 11 | Status: SHIPPED | OUTPATIENT
Start: 2018-05-15 | End: 2018-05-28 | Stop reason: HOSPADM

## 2018-05-15 NOTE — TELEPHONE ENCOUNTER
I tried to call the patient twice, no response. Her XR showed: Lucent lesion partially visualized in the mandible near the symphysis, measuring at least 1.7 cm in size.    It is unclear what exactly this is. I have ordered a CT for the patient. Please try to contact her and get this scheduled thanks.

## 2018-05-15 NOTE — PROGRESS NOTES
GYNECOLOGY OFFICE NOTE    Reason for visit: annual    HPI: Pt is a 27 y.o.  female  who presents for annual and evaluation of pelvic pain. Sharp stabbing pain rated 9/10. No alleviating factors. Worsened with intercourse. X 5 months. Not really associated with cycles. Gets pain with defecation or with a full bladder. Denies issues with constipation.  Cycle: menarche- 12, Interval- Q month, Duration- 3-4 days, Flow- normal, reports dysmenorrhea (not alleviated with ibuprofen). She is sexually active.  She uses oral progesterone-only contraceptive for contraception. States ANDRES make her violently  She does not desire STI screening. She denies vaginal discharge.  Last pap: , denies hx of abnormal. The use of hormonal contraception has been fully discussed with the patient. We discussed all options including OCPs, transdermal patches, vaginal ring, Depo Provera injections, Implanon, and IUD. Warnings about anticipated minor side effects such as breakthrough spotting, nausea, breast tenderness, weight changes, acne, headaches, etc were given.  She has been told of the more serious potential side effects such as MI, stroke, and deep vein thrombosis, all of which are very unlikely.  She has been asked to report any signs of such serious problems immediately. The need for additional protection, such as a condom, to prevent exposure to sexually transmitted diseases has also been discussed- the patient has been clearly reminded that no hormonal contraceptive method can protect her against diseases such as HIV and others. She understands and wishes to continue micronor. States tried COCs the past which made her feel bad.      Past Medical History:   Diagnosis Date    Anxiety     Asthma     Depression     Hx of psychiatric care     Obsessive-compulsive disorder     Psychiatric problem     Therapy        Past Surgical History:   Procedure Laterality Date    BUNIONECTOMY Right        Family History    Problem Relation Age of Onset    Anxiety disorder Mother     Hypertension Father     Depression Brother     Breast cancer Neg Hx     Colon cancer Neg Hx     Ovarian cancer Neg Hx        Social History   Substance Use Topics    Smoking status: Never Smoker    Smokeless tobacco: Never Used    Alcohol use Yes      Comment: once a month       OB History    Para Term  AB Living   0 0 0 0 0 0   SAB TAB Ectopic Multiple Live Births   0 0 0 0               Current Outpatient Prescriptions   Medication Sig    fluticasone (FLONASE) 50 mcg/actuation nasal spray     ibuprofen (ADVIL,MOTRIN) 200 MG tablet Take 200 mg by mouth every 6 (six) hours as needed for Pain.    multivitamin capsule Take 1 capsule by mouth once daily.    sertraline (ZOLOFT) 100 MG tablet Take 1 tablet (100 mg total) by mouth once daily.    tiZANidine (ZANAFLEX) 2 MG tablet Take 2 tablets (4 mg total) by mouth nightly as needed.    norethindrone (AYGESTIN) 5 mg Tab Take 1 tablet (5 mg total) by mouth once daily.     No current facility-administered medications for this visit.        Allergies: Eggs [egg derived]     /64   Wt 93.4 kg (205 lb 14.6 oz)   LMP 2018 (Exact Date)   BMI 34.27 kg/m²     ROS:  GENERAL: Denies fever or chills.   SKIN: Denies rash or lesions.   HEAD: Denies head injury or headache.   CHEST: Denies chest pain or shortness of breath.   CARDIOVASCULAR: Denies palpitations or chest pain.   ABDOMEN: No abdominal pain, constipation, diarrhea, nausea, vomiting or rectal bleeding.   URINARY: No dysuria, hematuria, or burning on urination.  REPRODUCTIVE: See HPI.   BREASTS: Denies pain, lumps, or nipple discharge.   NEUROLOGIC: Denies syncope or weakness.     Physical Exam:  GENERAL: alert, appears stated age and cooperative  CHEST: Normal respiratory effort  HEART: S1 and S2 normal, regular rate and rhythm  NECK: normal appearance, no thyromegaly masses or tenderness  SKIN: no acne, striae,  hirsutism  BREAST EXAM: breasts appear normal, no suspicious masses, no skin or nipple changes or axillary nodes  ABDOMEN: abdomen is soft without significant tenderness, masses, organomegaly or guarding, no hernias noted  EXTERNAL GENITALIA:  normal general appearance  URETHRA: normal urethra, normal urethral meatus  VAGINA:  normal mucosa without prolapse or lesions  CERVIX:  Normal  UTERUS:  Exam limited by habitus  ADNEXA:  normal adnexa in size, nontender and no masses    Diagnosis:  1. Well woman exam with routine gynecological exam    2. Pelvic pain in female    3. Screening for cervical cancer    4. Encounter for other general counseling or advice on contraception        Plan:   1. Annual  2. U/S ordered- could be secondary to endometriosis- we discussed dx and treatment options. Which include provera. Will start trial of aygestin. F/u with me after U/S  3. Pap today      Orders Placed This Encounter    Liquid-based pap smear, screening    norethindrone (AYGESTIN) 5 mg Tab    US OB/GYN Procedure ([facility])       Patient was counseled today on the new ACS guidelines for cervical cytology screening as well as the current recommendations for breast cancer screening. She was counseled to follow up with her PCP for other routine health maintenance. Counseling session lasted approximately 30 minutes, and all her questions were answered.    Follow-up for re-evaluation, ultrasound.      Katharina Quick MD  OB/GYN  Pager: [phone]

## 2018-05-16 NOTE — TELEPHONE ENCOUNTER
I spoke with patient and scheduled CT on 5/24/2018 at 9am. I advised patient that she have to fast four hours before CT and arrive to hours before scheduled appointment time.Patient voiced understating.

## 2018-05-21 ENCOUNTER — PROCEDURE VISIT (OUTPATIENT)
Dept: OBSTETRICS AND GYNECOLOGY | Facility: CLINIC | Age: 28
End: 2018-05-21
Payer: COMMERCIAL

## 2018-05-21 ENCOUNTER — OFFICE VISIT (OUTPATIENT)
Dept: OBSTETRICS AND GYNECOLOGY | Facility: CLINIC | Age: 28
End: 2018-05-21
Payer: COMMERCIAL

## 2018-05-21 VITALS
WEIGHT: 206.38 LBS | BODY MASS INDEX: 34.34 KG/M2 | SYSTOLIC BLOOD PRESSURE: 108 MMHG | DIASTOLIC BLOOD PRESSURE: 60 MMHG

## 2018-05-21 DIAGNOSIS — R10.2 PELVIC PAIN: Primary | ICD-10-CM

## 2018-05-21 DIAGNOSIS — R10.2 PELVIC PAIN IN FEMALE: ICD-10-CM

## 2018-05-21 PROCEDURE — 99212 OFFICE O/P EST SF 10 MIN: CPT | Mod: 25,S$GLB,, | Performed by: OBSTETRICS & GYNECOLOGY

## 2018-05-21 PROCEDURE — 99999 PR PBB SHADOW E&M-EST. PATIENT-LVL II: CPT | Mod: PBBFAC,,, | Performed by: OBSTETRICS & GYNECOLOGY

## 2018-05-21 PROCEDURE — 76830 TRANSVAGINAL US NON-OB: CPT | Mod: S$GLB,,, | Performed by: OBSTETRICS & GYNECOLOGY

## 2018-05-21 PROCEDURE — 3008F BODY MASS INDEX DOCD: CPT | Mod: CPTII,S$GLB,, | Performed by: OBSTETRICS & GYNECOLOGY

## 2018-05-21 NOTE — PROGRESS NOTES
GYNECOLOGY OFFICE NOTE    Reason for visit: U/S follow-up    HPI: Pt is a 27 y.o.  female  who presents for annual and evaluation of pelvic pain. U/S was normal. Discussed possible constipation and increasing fiber in diet or taking supplements. Pt has not yet started aygestin as prescribed.       Past Medical History:   Diagnosis Date    Anxiety     Asthma     Depression     Hx of psychiatric care     Obsessive-compulsive disorder     Psychiatric problem     Therapy        Past Surgical History:   Procedure Laterality Date    BUNIONECTOMY Right 2009       Family History   Problem Relation Age of Onset    Anxiety disorder Mother     Hypertension Father     Depression Brother     Breast cancer Neg Hx     Colon cancer Neg Hx     Ovarian cancer Neg Hx        Social History   Substance Use Topics    Smoking status: Never Smoker    Smokeless tobacco: Never Used    Alcohol use Yes      Comment: once a month       OB History    Para Term  AB Living   0 0 0 0 0 0   SAB TAB Ectopic Multiple Live Births   0 0 0 0               Current Outpatient Prescriptions   Medication Sig    fluticasone (FLONASE) 50 mcg/actuation nasal spray     ibuprofen (ADVIL,MOTRIN) 200 MG tablet Take 200 mg by mouth every 6 (six) hours as needed for Pain.    multivitamin capsule Take 1 capsule by mouth once daily.    norethindrone (AYGESTIN) 5 mg Tab Take 1 tablet (5 mg total) by mouth once daily.    sertraline (ZOLOFT) 100 MG tablet Take 1 tablet (100 mg total) by mouth once daily.     No current facility-administered medications for this visit.        Allergies: Eggs [egg derived]     /60   Wt 93.6 kg (206 lb 5.6 oz)   LMP 2018   BMI 34.34 kg/m²     ROS:  GENERAL: Denies fever or chills.   SKIN: Denies rash or lesions.   HEAD: Denies head injury or headache.   CHEST: Denies chest pain or shortness of breath.   CARDIOVASCULAR: Denies palpitations or chest pain.   ABDOMEN: No abdominal  pain, constipation, diarrhea, nausea, vomiting or rectal bleeding.   URINARY: No dysuria, hematuria, or burning on urination.  REPRODUCTIVE: See HPI.   BREASTS: Denies pain, lumps, or nipple discharge.   NEUROLOGIC: Denies syncope or weakness.     Physical Exam:  GENERAL: alert, appears stated age and cooperative  CHEST: Normal respiratory effort  HEART: S1 and S2 normal, regular rate and rhythm  NECK: normal appearance, no thyromegaly masses or tenderness  SKIN: no acne, striae, hirsutism  Talk only    Diagnosis:  1. Pelvic pain        Plan:   1. Fiber and start aygestin. rtc in 2-3 months for re-evaluation. Discussed possibility of endometriosis and trial of aygestin can help         Patient was counseled today on the new ACS guidelines for cervical cytology screening as well as the current recommendations for breast cancer screening. She was counseled to follow up with her PCP for other routine health maintenance.     Follow-up if symptoms worsen or fail to improve.      Katharina Quick MD  OB/GYN  Pager: [phone]

## 2018-05-24 ENCOUNTER — TELEPHONE (OUTPATIENT)
Dept: OBSTETRICS AND GYNECOLOGY | Facility: CLINIC | Age: 28
End: 2018-05-24

## 2018-05-24 ENCOUNTER — HOSPITAL ENCOUNTER (OUTPATIENT)
Dept: RADIOLOGY | Facility: HOSPITAL | Age: 28
Discharge: HOME | End: 2018-05-24
Attending: FAMILY MEDICINE
Payer: COMMERCIAL

## 2018-05-24 DIAGNOSIS — M26.12: ICD-10-CM

## 2018-05-24 PROCEDURE — 70487 CT MAXILLOFACIAL W/DYE: CPT | Mod: 26,,, | Performed by: RADIOLOGY

## 2018-05-24 PROCEDURE — 25500020 PHARM REV CODE 255: Performed by: FAMILY MEDICINE

## 2018-05-24 PROCEDURE — 70487 CT MAXILLOFACIAL W/DYE: CPT | Mod: TC

## 2018-05-24 RX ADMIN — IOHEXOL 75 ML: 350 INJECTION, SOLUTION INTRAVENOUS at 08:05

## 2018-05-24 NOTE — TELEPHONE ENCOUNTER
Called to inform pt of LSIL pap and need for colpo. Please assist with scheduling.     Katharina Quick MD, FACOG  OB/GYN  Pager: [phone]

## 2018-05-28 ENCOUNTER — PATIENT MESSAGE (OUTPATIENT)
Dept: FAMILY MEDICINE | Facility: CLINIC | Age: 28
End: 2018-05-28

## 2018-05-28 ENCOUNTER — TELEPHONE (OUTPATIENT)
Dept: OTOLARYNGOLOGY | Facility: CLINIC | Age: 28
End: 2018-05-28

## 2018-05-28 DIAGNOSIS — M26.9 JAW ANOMALY: Primary | ICD-10-CM

## 2018-05-28 RX ORDER — ACETAMINOPHEN AND CODEINE PHOSPHATE 120; 12 MG/5ML; MG/5ML
1 SOLUTION ORAL DAILY
Qty: 30 TABLET | Refills: 11 | Status: SHIPPED | OUTPATIENT
Start: 2018-05-28 | End: 2019-05-27 | Stop reason: SDUPTHER

## 2018-05-28 NOTE — TELEPHONE ENCOUNTER
----- Message from Marce Samson sent at 5/28/2018  9:31 AM CDT -----  Contact: self / [phone]  Patient is requesting a call back regarding her birthcontrol. Please advise

## 2018-05-28 NOTE — TELEPHONE ENCOUNTER
Contacted pt regarding previous call to schedule Colpo. Pt states she will have a new insurance next month and would like to wait until her new insurance is active. Advised pt to call next month to schedule procedure. Patient verbalized understanding.

## 2018-05-28 NOTE — TELEPHONE ENCOUNTER
Returned call, pt states she does not like the new ocp aygestin and would like to be put back on micronor.

## 2018-05-28 NOTE — TELEPHONE ENCOUNTER
----- Message from Endy Draper DO sent at 5/28/2018  9:23 AM CDT -----  Thanks! I will place the referral today.   ----- Message -----  From: Irene Burroughs RN  Sent: 5/25/2018   4:38 PM  To: Endy Draper DO     I will call to schedule patient for a consult visit with Dr. Gramajo.     -ALYSON Bonilla    ----- Message -----  From: Tyrel Gramajo MD  Sent: 5/25/2018   4:31 PM  To: Irene Burroughs RN    I'll see her.   ----- Message -----  From: Irene Burroughs RN  Sent: 5/25/2018   2:34 PM  To: Tyrel Gramajo MD    Please advise.  -Josselin    ----- Message -----  From: Endy Draper DO  Sent: 5/25/2018   2:27 PM  To: [facility] Staff    Hi,   I am seeing this patient and incidentally found a lesion in her jaw. A CT was performed and this showed: 2.2 cm predominantly lucent mass in the anterior mandible.  This has the suggestion of internal fat content and most likely represents a benign process such as an intraosseous lipoma.  Other entities such as a odontogenic keratocyst, fibrous dysplasia, or odontogenic ameloblastoma are felt to be unlikely but cannot be entirely excluded.     Would this be something that Dr. Gramajo would want to see in clinic? If so, would he want any further testing done prior to visit?     Patient is asymptomatic.     Thanks in advance for your help.    Endy Draper

## 2018-06-14 ENCOUNTER — OFFICE VISIT (OUTPATIENT)
Dept: OTOLARYNGOLOGY | Facility: CLINIC | Age: 28
End: 2018-06-14
Payer: COMMERCIAL

## 2018-06-14 VITALS
BODY MASS INDEX: 34.85 KG/M2 | WEIGHT: 209.44 LBS | TEMPERATURE: 98 F | DIASTOLIC BLOOD PRESSURE: 62 MMHG | SYSTOLIC BLOOD PRESSURE: 102 MMHG | HEART RATE: 72 BPM

## 2018-06-14 DIAGNOSIS — M27.9 LESION OF MANDIBLE: Primary | ICD-10-CM

## 2018-06-14 PROCEDURE — 99243 OFF/OP CNSLTJ NEW/EST LOW 30: CPT | Mod: S$GLB,,, | Performed by: OTOLARYNGOLOGY

## 2018-06-14 PROCEDURE — 99999 PR PBB SHADOW E&M-EST. PATIENT-LVL III: CPT | Mod: PBBFAC,,, | Performed by: OTOLARYNGOLOGY

## 2018-06-14 NOTE — LETTER
June 20, 2018      Endy Draper, DO  [address] [facility]  [place] [address]           [facility]  [address]  [facility] [address]  Phone: [phone]  Fax: [phone]          Patient: Christianne Faria   MR Number: [id number]   YOB: 1990   Date of Visit: 6/14/2018       Dear Dr. Endy Draper:    Thank you for referring Christianne Faria to me for evaluation. Attached you will find relevant portions of my assessment and plan of care.    If you have questions, please do not hesitate to call me. I look forward to following Christianne Faria along with you.    Sincerely,    Tyrel Gramajo MD    Enclosure  CC:  No Recipients    If you would like to receive this communication electronically, please contact [email or web address] or [phone] to request more information on [facility] access.    For providers and/or their staff who would like to refer a patient to [facility], please contact us through our one-stop-shop provider referral line, [facility], at [phone].    If you feel you have received this communication in error or would no longer like to receive these types of communications, please e-mail [email or web address]

## 2018-06-20 ENCOUNTER — TELEPHONE (OUTPATIENT)
Dept: OTOLARYNGOLOGY | Facility: CLINIC | Age: 28
End: 2018-06-20

## 2018-06-20 PROBLEM — M27.9 LESION OF MANDIBLE: Status: ACTIVE | Noted: 2018-06-20

## 2018-06-20 NOTE — PROGRESS NOTES
Chief Complaint   Patient presents with    consult/ small lump inside cheek       HPI   27 y.o. female presents from Dr. Draper for evaluation of a lesion of the mandible.  This lesion was incidentally noted on CT scan.  No complaints.  No pain, no change in her appearance.  Nod dysphagia. She has not appreciated any lesions of the mouth.     Review of Systems   Constitutional: Negative for fatigue and unexpected weight change.   HENT: Per HPI.  Eyes: Negative for visual disturbance.   Respiratory: Negative for shortness of breath, hemoptysis   Cardiovascular: Negative for chest pain and palpitations.   Musculoskeletal: Negative for decreased ROM, back pain.   Skin: Negative for rash, sunburn, itching.   Neurological: Negative for dizziness and seizures.   Hematological: Negative for adenopathy. Does not bruise/bleed easily.   Endocrine: Negative for rapid weight loss/weight gain, heat/cold intolerance.     Past Medical History   Patient Active Problem List   Diagnosis    Anxiety    Gastroesophageal reflux disease    Depression           Past Surgical History   Past Surgical History:   Procedure Laterality Date    BUNIONECTOMY Right 2009         Family History   Family History   Problem Relation Age of Onset    Anxiety disorder Mother     Hypertension Father     Depression Brother     Breast cancer Neg Hx     Colon cancer Neg Hx     Ovarian cancer Neg Hx            Social History   .  Social History     Social History    Marital status: Single     Spouse name: N/A    Number of children: N/A    Years of education: N/A     Occupational History          Social History Main Topics    Smoking status: Never Smoker    Smokeless tobacco: Never Used    Alcohol use Yes      Comment: once a month    Drug use: No    Sexual activity: Yes     Partners: Male     Birth control/ protection: OCP      Comment: doesn't use protection     Other Topics Concern    Patient Feels They Ought To Cut Down On  Drinking/Drug Use No    Patient Annoyed By Others Criticizing Their Drinking/Drug Use No    Patient Has Felt Bad Or Guilty About Drinking/Drug Use No    Patient Has Had A Drink/Used Drugs As An Eye Opener In [place] No     Social History Narrative    No narrative on file         Allergies   Review of patient's allergies indicates:   Allergen Reactions    Eggs [egg derived] Diarrhea           Physical Exam     Vitals:    06/14/18 1445   BP: 102/62   Pulse: 72   Temp: 97.9 °F (36.6 °C)         Body mass index is 34.85 kg/m².      General: AOx3, NAD   Respiratory:  Symmetric chest rise, normal effort  Nose: No gross nasal septal deviation. Inferior Turbinates WNL bilaterally. No septal perforation. No masses/lesions.   Oral Cavity:  Oral Tongue mobile, no lesions noted. Hard Palate WNL. No buccal or FOM lesions.  Oropharynx:  No masses/lesions of the posterior pharyngeal wall. Tonsillar fossa without lesions. Soft palate without masses. Midline uvula.   Neck: No scars.  No cervical lymphadenopathy, thyromegaly or thyroid nodules.  Normal range of motion.    Face: House Brackmann I bilaterally.     CT neck: reviewed.    Assessment/Plan  Problem List Items Addressed This Visit        ENT    Lesion of mandible - Primary     Mandible lesion possibly consistent with an intraosseous lipoma.  MRI neck ordered to assess in greater detail.  Will contact her with MRI results.          Relevant Orders    MRI Soft Tissue Neck W W/O Contrast

## 2018-06-20 NOTE — TELEPHONE ENCOUNTER
Voicemail left informing pt I was calling to schedule a neck MRI that was ordered by Dr. Gramajo. Requested to return call to schedule.

## 2018-06-20 NOTE — ASSESSMENT & PLAN NOTE
Mandible lesion possibly consistent with an intraosseous lipoma.  MRI neck ordered to assess in greater detail.  Will contact her with MRI results.

## 2018-07-02 ENCOUNTER — TELEPHONE (OUTPATIENT)
Dept: OTOLARYNGOLOGY | Facility: CLINIC | Age: 28
End: 2018-07-02

## 2018-07-02 NOTE — TELEPHONE ENCOUNTER
----- Message from Tyrel Gramajo MD sent at 6/20/2018  1:44 PM CDT -----  Please set her up for an MRI of the neck.  Thanks.

## 2018-07-02 NOTE — TELEPHONE ENCOUNTER
I spoke with Ms. Faria regarding scheduling MRI ordered by Dr. Gramajo. Pt states she could not afford it at this time. I offered to connect her with our financial care coordinator to discuss options to help. She declined the offer. I informed pt that she could write us through her [facility] portal or call to schedule whenever she is ready. Pt verbalized understanding.

## 2018-07-09 DIAGNOSIS — F41.9 ANXIETY: ICD-10-CM

## 2018-07-09 DIAGNOSIS — F32.A DEPRESSION, UNSPECIFIED DEPRESSION TYPE: ICD-10-CM

## 2018-07-09 RX ORDER — SERTRALINE HYDROCHLORIDE 100 MG/1
100 TABLET, FILM COATED ORAL DAILY
Qty: 90 TABLET | Refills: 1 | Status: SHIPPED | OUTPATIENT
Start: 2018-07-09 | End: 2019-01-30

## 2018-07-11 ENCOUNTER — TELEPHONE (OUTPATIENT)
Dept: OTOLARYNGOLOGY | Facility: CLINIC | Age: 28
End: 2018-07-11

## 2018-07-11 NOTE — TELEPHONE ENCOUNTER
----- Message from Nguyen Ramon sent at 7/11/2018 11:39 AM CDT -----  Contact: patient   Needs Advice    Reason for call: Patient wants to schedule MRI - would like to discuss options        Communication Preference: [phone]

## 2018-07-12 ENCOUNTER — HOSPITAL ENCOUNTER (OUTPATIENT)
Dept: RADIOLOGY | Facility: HOSPITAL | Age: 28
Discharge: HOME | End: 2018-07-12
Attending: OTOLARYNGOLOGY
Payer: COMMERCIAL

## 2018-07-12 DIAGNOSIS — M27.9 LESION OF MANDIBLE: ICD-10-CM

## 2018-07-12 PROCEDURE — 70543 MRI ORBT/FAC/NCK W/O &W/DYE: CPT | Mod: 26,,, | Performed by: RADIOLOGY

## 2018-07-12 PROCEDURE — A9585 GADOBUTROL INJECTION: HCPCS | Performed by: OTOLARYNGOLOGY

## 2018-07-12 PROCEDURE — 70543 MRI ORBT/FAC/NCK W/O &W/DYE: CPT | Mod: TC

## 2018-07-12 PROCEDURE — 25500020 PHARM REV CODE 255: Performed by: OTOLARYNGOLOGY

## 2018-07-12 RX ORDER — GADOBUTROL 604.72 MG/ML
10 INJECTION INTRAVENOUS
Status: COMPLETED | OUTPATIENT
Start: 2018-07-12 | End: 2018-07-12

## 2018-07-12 RX ADMIN — GADOBUTROL 10 ML: 604.72 INJECTION INTRAVENOUS at 10:07

## 2018-07-13 ENCOUNTER — PATIENT MESSAGE (OUTPATIENT)
Dept: OTOLARYNGOLOGY | Facility: CLINIC | Age: 28
End: 2018-07-13

## 2019-01-30 ENCOUNTER — HOSPITAL ENCOUNTER (OUTPATIENT)
Dept: RADIOLOGY | Facility: HOSPITAL | Age: 29
Discharge: HOME | End: 2019-01-30
Attending: FAMILY MEDICINE
Payer: COMMERCIAL

## 2019-01-30 ENCOUNTER — PATIENT MESSAGE (OUTPATIENT)
Dept: FAMILY MEDICINE | Facility: CLINIC | Age: 29
End: 2019-01-30

## 2019-01-30 ENCOUNTER — OFFICE VISIT (OUTPATIENT)
Dept: FAMILY MEDICINE | Facility: CLINIC | Age: 29
End: 2019-01-30
Payer: COMMERCIAL

## 2019-01-30 VITALS
WEIGHT: 217.38 LBS | BODY MASS INDEX: 36.22 KG/M2 | SYSTOLIC BLOOD PRESSURE: 102 MMHG | DIASTOLIC BLOOD PRESSURE: 74 MMHG | OXYGEN SATURATION: 98 % | HEIGHT: 65 IN | HEART RATE: 86 BPM | TEMPERATURE: 98 F

## 2019-01-30 DIAGNOSIS — M79.671 RIGHT FOOT PAIN: ICD-10-CM

## 2019-01-30 DIAGNOSIS — M27.9 LESION OF MANDIBLE: ICD-10-CM

## 2019-01-30 DIAGNOSIS — M72.2 PLANTAR FASCIA SYNDROME: ICD-10-CM

## 2019-01-30 DIAGNOSIS — F32.A DEPRESSION, UNSPECIFIED DEPRESSION TYPE: ICD-10-CM

## 2019-01-30 DIAGNOSIS — F41.9 ANXIETY: ICD-10-CM

## 2019-01-30 DIAGNOSIS — Z01.419 WELL WOMAN EXAM: Primary | ICD-10-CM

## 2019-01-30 PROCEDURE — 99395 PR PREVENTIVE VISIT,EST,18-39: ICD-10-PCS | Mod: S$GLB,,, | Performed by: FAMILY MEDICINE

## 2019-01-30 PROCEDURE — 73630 X-RAY EXAM OF FOOT: CPT | Mod: 26,RT,, | Performed by: RADIOLOGY

## 2019-01-30 PROCEDURE — 99999 PR PBB SHADOW E&M-EST. PATIENT-LVL IV: ICD-10-PCS | Mod: PBBFAC,,, | Performed by: FAMILY MEDICINE

## 2019-01-30 PROCEDURE — 99395 PREV VISIT EST AGE 18-39: CPT | Mod: S$GLB,,, | Performed by: FAMILY MEDICINE

## 2019-01-30 PROCEDURE — 99999 PR PBB SHADOW E&M-EST. PATIENT-LVL IV: CPT | Mod: PBBFAC,,, | Performed by: FAMILY MEDICINE

## 2019-01-30 PROCEDURE — 73630 XR FOOT COMPLETE 3 VIEW RIGHT: ICD-10-PCS | Mod: 26,RT,, | Performed by: RADIOLOGY

## 2019-01-30 PROCEDURE — 73630 X-RAY EXAM OF FOOT: CPT | Mod: TC,FY,PO,RT

## 2019-01-30 PROCEDURE — 99214 OFFICE O/P EST MOD 30 MIN: CPT | Mod: PBBFAC,25,PO | Performed by: FAMILY MEDICINE

## 2019-01-30 RX ORDER — SERTRALINE HYDROCHLORIDE 50 MG/1
50 TABLET, FILM COATED ORAL DAILY
Qty: 90 TABLET | Refills: 1 | Status: SHIPPED | OUTPATIENT
Start: 2019-01-30 | End: 2019-03-07 | Stop reason: SDUPTHER

## 2019-01-30 NOTE — PROGRESS NOTES
Subjective:       Patient ID: Christianne Faria is a 28 y.o. female.    Chief Complaint: Annual Exam    Christianne Faria is a 28 y.o. female who presents today to for a physical.     She has recently gained some weight. This is due to increased intake of cookies.  Her anxiety and depression are well controlled.  She has decreased her Zoloft 50 mg on her own.  There has been no change in her symptom control despite this decreased medication.  She has been teaching herself coping skills and is able to force herself to stop having ruminating thoughts when they occur.    She has a history of right foot surgery.  Her right foot has also been hurting for about 4-6 months.  The 1st step is the most painful in the morning.  She denies any other joint or systemic symptoms.    Flu: declined  Tdap: declined  Labs: Ordered  Pap: UTD    PMHx: reviewed in EMR  SHX: reviewed in EMR  FMHx: no family history of colon cancer, breast cancer, ovarian cancer  Social: 6 siblings. Twin brother with Hx of depression. lives with brother, brother's gf, and her BF. There are tree cats at home. No smokers at home. She quit her old job and currently works at [employer]. She is much happier with this new job.  Has boyfriend x about 2 year. On OCP.       Review of Systems   Constitutional: Negative for activity change and unexpected weight change.   HENT: Negative for hearing loss, rhinorrhea and trouble swallowing.    Eyes: Negative for discharge and visual disturbance.   Respiratory: Negative for chest tightness and wheezing.    Cardiovascular: Negative for chest pain and palpitations.   Gastrointestinal: Negative for blood in stool, constipation, diarrhea and vomiting.   Endocrine: Negative for polydipsia and polyuria.   Genitourinary: Negative for difficulty urinating, dysuria, hematuria and menstrual problem.   Musculoskeletal: Positive for neck pain. Negative for arthralgias and joint swelling.   Neurological: Negative for weakness and  headaches.   Psychiatric/Behavioral: Negative for confusion and dysphoric mood.           Objective:     Vitals:    01/30/19 1420   BP: 102/74   Pulse: 86   Temp: 97.8 °F (36.6 °C)        Physical Exam   Constitutional: She is oriented to person, place, and time. She appears well-developed and well-nourished.   HENT:   Head: Normocephalic and atraumatic.   Right Ear: Tympanic membrane, external ear and ear canal normal.   Left Ear: Tympanic membrane, external ear and ear canal normal.   Mouth/Throat: Mucous membranes are normal.   Mild nasal congestion and cobblestoning noted   Eyes: Conjunctivae and EOM are normal. Pupils are equal, round, and reactive to light.   Neck: Normal range of motion.   Cardiovascular: Normal rate, regular rhythm and normal heart sounds.   Pulmonary/Chest: Effort normal and breath sounds normal. No respiratory distress.   Abdominal: Soft. Bowel sounds are normal. She exhibits no distension.   obese   Genitourinary:   Genitourinary Comments: deferred    Musculoskeletal: She exhibits no edema.        Feet:    Neurological: She is alert and oriented to person, place, and time. No cranial nerve deficit or sensory deficit. She exhibits normal muscle tone.   Skin: Skin is warm and dry.   Psychiatric: She has a normal mood and affect. Her speech is normal and behavior is normal. Judgment and thought content normal.   Nursing note and vitals reviewed.      Assessment:       1. Well woman exam    2. Anxiety    3. Depression, unspecified depression type    4. Right foot pain    5. Plantar fascia syndrome    6. BMI 36.0-36.9,adult    7. Lesion of mandible        Plan:       Follow up with ENT for jaw lesion as scheduled. Follow up with myself in 6 months. Labs today. Contact me in 6 weeks if foot pain is persisting.     Well woman exam  ?Avoid tobacco  ?Be physically active  ?Maintain a healthy weight  ?Eat a diet rich in fruits, vegetables, and whole grains, and low in saturated/trans fat  ?Limit  alcohol consumption  ?Protect against sexually transmitted infections  ?Avoid excess sun  -     CBC auto differential; Future; Expected date: 01/30/2019  -     Comprehensive metabolic panel; Future; Expected date: 01/30/2019  -     Hemoglobin A1c; Future; Expected date: 01/30/2019  -     Lipid panel; Future; Expected date: 01/30/2019  -     TSH; Future; Expected date: 01/30/2019  -     Vitamin D; Future; Expected date: 01/30/2019    Anxiety/Depression, unspecified depression type  Continue zoloft at 50 mg. See AVS for full handout details  Follow up in 6 months.   -     sertraline (ZOLOFT) 50 MG tablet; Take 1 tablet (50 mg total) by mouth once daily.  Dispense: 90 tablet; Refill: 1    Right foot pain/Plantar fascia syndrome  Wear good shoes. Stretch your feet as shown during clinic. Use a frozen water bottle and roll your foot on it as tolerated to stretch this fascia out. If symptoms are persisting in 6 weeks, call me and I will place a referral to podiatry. Given history of prior foot surgery will do XR of the foot  -     X-Ray Foot Complete Right; Future; Expected date: 01/30/2019    BMI 36.0-36.9,adult  See AVS  Increase exercise  Decrease sweet intake  [employer].       Parts of the above note were dictated using a voice dictation software. Please excuse any grammatical or typographical errors.     Warning signs discussed, patient to call with any further issues or worsening of symptoms.

## 2019-01-30 NOTE — PATIENT INSTRUCTIONS
?Avoid tobacco  ?Be physically active  ?Maintain a healthy weight  ?Eat a diet rich in fruits, vegetables, and whole grains, and low in saturated/trans fat  ?Limit alcohol consumption  ?Protect against sexually transmitted infections  ?Avoid excess sun    Wear good shoes. Stretch your feet as shown. Use a frozen water bottle and roll your foot on it as tolerated to stretch this fascia out. If symptoms are persisting in 6 weeks, call me and I will place a referral to podiatry    No TV in bedroom  Sleep hygiene   Exercise 3 x/week  Make plans at least once a week with a friend  Journal - 3 good things  Every bad day is a good day to journal  Families problems are not your own  Continue SSRI    Books to Read:  The Feeling Good Handbook by Marcos Vance  What you can change and what you can't by Amadou Bryan      Plantar Fasciitis  Plantar fasciitis is a painful swelling of the plantar fascia. The plantar fascia is a thick, fibrous layer of tissue. It covers the bones on the bottom of your foot. And it supports the foot bones in an arched position.  This can happen gradually or suddenly. It usually affects one foot at a time. Heel pain can be sharp, like a knife sticking into the bottom of your foot. You may feel pain after exercising, long-distance jogging, stair climbing, long periods of standing, or after standing up.  Risk factors include: non-active lifestyle, arthritis, diabetes, obesity or recent weight gain, flat foot, high arch. Wearing high heels, loose shoes, or shoes with poor arch support for long periods of time adds to the risk. This problem is commonly found in runners and dancers. It also found in people who stand on hard surfaces for long periods of time.  Foot pain from this condition is usually worse in the morning. But it improves with walking. By the end of the day there may be a dull aching. Treatment requires short-term rest and controlling swelling. It may take up to 9 months before all  symptoms go away. Rarely, a steroid injection into the foot, or surgery, may be needed.  Home care  · If you are overweight, lose weight to help healing.  · Choose supportive shoes with good arch support and shock absorbency. Replace athletic shoes when they become worn out. Dont walk or run barefoot.  · Premade or custom-fitted shoe inserts may be helpful. Inserts made of silicone seem to be the most effective. Custom-made inserts can be provided by a podiatrist or foot specialist, physical therapist, or orthopedist.  · Premade or custom-made night splints keep the heel stretched out while you sleep. They may prevent morning pain.  · Avoid activities that stress the feet: jogging, prolonged standing or walking, contact sports, etc.  · First thing in the morning and before sports, stretch the bottom of your feet. Gently flex your ankle so the toes move toward your knee.  · Icing may help control heel pain. Apply an ice pack to the heel for 10-20 minutes as a preventive. Or ice your heel after a severe flare-up of symptoms. You may repeat this every 1-2 hours as needed.  · You may use over-the-counter pain medicine to control pain, unless another medicine was prescribed. Anti-inflammatory pain medicines, such as ibuprofen or naproxen, may work better than acetaminophen. If you have chronic liver or kidney disease or ever had a stomach ulcer or GI bleeding, talk with your healthcare provider before using these medicines.  Follow-up care  Follow up with your healthcare provider, physical therapist, or podiatrist or foot specialist as advised.  Call for an appointment if pain worsens or there is no relief after a few weeks of home treatment. Shoe inserts, a night splint, or a special boot may be required.  If X-rays were taken, you will be told of any new findings that may affect your care.  When to seek medical advice  Call your healthcare provider right away if any of these occur:  · Foot swelling  · Redness with  increasing pain  Date Last Reviewed: 11/21/2015  © 3193-5029 [employer]. [address] [place] [address], [place] [address]. All rights reserved. This information is not intended as a substitute for professional medical care. Always follow your healthcare professional's instructions.    4 Steps for Eating Healthier  Changing the way you eat can improve your health. It can lower your cholesterol and blood pressure, and help you stay at a healthy weight. Your diet doesnt have to be bland and boring to be healthy. Just watch your calories and follow these steps:    1. Eat fewer unhealthy fats  · Choose more fish and lean meats instead of fatty cuts of meat.  · Skip butter and lard, and use less margarine.  · Pass on foods that have palm, coconut, or hydrogenated oils.  · Eat fewer high-fat dairy foods like cheese, ice cream, and whole milk.  · Get a heart-healthy cookbook and try some low-fat recipes.  2. Go light on salt  · Keep the saltshaker off the table.  · Limit high-salt ingredients, such as soy sauce, bouillon, and garlic salt.  · Instead of adding salt when cooking, season your food with herbs and flavorings. Try lemon, garlic, and onion.  · Limit convenience foods, such as boxed or canned foods and restaurant food.  · Read food labels and choose lower-sodium options.  3. Limit sugar  · Pause before you add sugars to pancakes, cereal, coffee, or tea. This includes white and brown table sugar, syrup, honey, and molasses. Cut your usual amount by half.  · Use non-sugar sweeteners. Stevia, aspartame, and sucralose can satisfy a sweet tooth without adding calories.  · Swap out sugar-filled soda and other drinks. Buy sugar-free or low-calorie beverages. Remember water is always the best choice.  · Read labels and choose foods with less added sugar. Keep in mind that dairy foods and foods with fruit will have some natural sugar.  · Cut the sugar in recipes by 1/3 to 1/2. Boost the flavor with extracts like  almond, vanilla, or orange. Or add spices such as cinnamon or nutmeg.  4. Eat more fiber  · Eat fresh fruits and vegetables every day.  · Boost your diet with whole grains. Go for oats, whole-grain rice, and bran.  · Add beans and lentils to your meals.  · Drink more water to match your fiber increase. This is to help prevent constipation.  Date Last Reviewed: 5/11/2015  © 0923-5698 [employer]. [address] [place] [address], [place] [address]. All rights reserved. This information is not intended as a substitute for professional medical care. Always follow your healthcare professional's instructions.        Understanding Fat and Cholesterol  Too much cholesterol in your blood can lead to many problems such as blocked arteries. This can lead to heart attack and stroke. One of the best ways to manage heart and blood vessel disease is to lower your blood cholesterol. Planning meals that are low in saturated fat and cholesterol helps reduce the level of cholesterol in your blood. Below are eating tips to help lower your blood cholesterol levels.  Eat less fat  A healthy goal is to have less than 25% to 35% of your daily calories come from fat. Instead of fats, eat more fruits, whole-grains, and vegetables. This also helps control your weight, and can even reduce your risk for some cancers. There are different kinds of fats in foods. Fats can be saturated, unsaturated, or trans fats. The best fats to choose are unsaturated fats. But fats are high in calories, so eat even unsaturated fats sparingly.  Limit foods high in saturated fats  Saturated fats come from animals and certain plants (such as coconut and palm). Eating too much saturated fat can raise your blood cholesterol levels and make your artery problems worse. Your goal is to eat less saturated fat. Below are some examples of foods that contain lots of saturated fat:  · Fatty cuts of meat (lamb, ham, beef)  · Many pastries, cakes, cookies, and  candies  · Cream, ice cream, sour cream, cheese, and butter, and foods made with them  · Sauces made with butter or cream  · Salad dressings with saturated fats  · Foods that contain palm or coconut oil  Choose unsaturated fats  Unsaturated fats are usually liquid at room temperature. They are better choices for your heart than saturated fat. There are two types of unsaturated fats: polyunsaturated fat and monounsaturated fat. Aim to replace saturated fats with polyunsaturated or monounsaturated fats.  · Polyunsaturated fats are found in corn oil, safflower oil, sunflower oil, and other vegetable oils.  · Monounsaturated fats are found in olive oil, canola oil, and peanut oil. Some margarines and spreads are now made with these oils, too. Avocados are also high in monounsaturated fat.  Of all fats, monounsaturated fats are the least harmful to your heart.  Avoid trans fats  Like saturated fats, trans fats have been linked to heart disease. Even a small amount can harm your health. Trans fats are found in liquid oils that have been changed to be solid at room temperature. Margarine, which is often made from vegetable oil, is one example. Vegetable shortening is another. Trans fats are often found in packaged goods. Check ingredients for the words hydrogenated or partially hydrogenated. They mean the foods contain trans fat.  What about triglycerides?  Triglycerides are a type of fat in your blood. Like cholesterol, high levels of triglycerides can lead to blocked arteries. High triglyceride levels can be reduced 20% to 50%  by limiting added sugars in your diet, susbstituting healthier fats for saturated and trans fats, getting more physical activity, and losing weight if your are overweight. You may also be advised to avoid or limi alcohol.    Reading food labels  Luckily, most foods now have labels giving you the facts about what youre eating. Reading food labels helps you make healthy choices. Look for the  words highlighted below.  · Serving Size. This is the amount of food in 1 serving. If you eat larger portions, be sure to count more of everything: fat, calories, and cholesterol.  · Total Fat. Tells you how many grams (g) of fat are in 1 serving.  · Calories from Fat. This tells you the total number of calories from fat in 1 serving (there are 9 calories per gram of fat). Look for foods with the fewest calories from fat.  · Saturated Fat. Tells you how many grams (g) of saturated fat are in 1 serving.  · Trans Fat. Tells how many grams (g) of trans fat are in 1 serving.  · Cholesterol. Tells you how many milligrams (mg) of cholesterol are in 1 serving.  Date Last Reviewed: 5/11/2015  © 0773-1949 [employer]. [address] [place] [address], [place] [address]. All rights reserved. This information is not intended as a substitute for professional medical care. Always follow your healthcare professional's instructions.

## 2019-02-15 ENCOUNTER — PATIENT MESSAGE (OUTPATIENT)
Dept: ADMINISTRATIVE | Facility: OTHER | Age: 29
End: 2019-02-15

## 2019-03-07 DIAGNOSIS — F32.A DEPRESSION, UNSPECIFIED DEPRESSION TYPE: ICD-10-CM

## 2019-03-07 DIAGNOSIS — F41.9 ANXIETY: ICD-10-CM

## 2019-03-07 RX ORDER — SERTRALINE HYDROCHLORIDE 50 MG/1
50 TABLET, FILM COATED ORAL DAILY
Qty: 90 TABLET | Refills: 1 | Status: SHIPPED | OUTPATIENT
Start: 2019-03-07 | End: 2019-05-15 | Stop reason: SDUPTHER

## 2019-05-15 ENCOUNTER — TELEPHONE (OUTPATIENT)
Dept: OBSTETRICS AND GYNECOLOGY | Facility: CLINIC | Age: 29
End: 2019-05-15

## 2019-05-15 ENCOUNTER — OFFICE VISIT (OUTPATIENT)
Dept: FAMILY MEDICINE | Facility: CLINIC | Age: 29
End: 2019-05-15
Payer: COMMERCIAL

## 2019-05-15 VITALS
TEMPERATURE: 99 F | DIASTOLIC BLOOD PRESSURE: 64 MMHG | BODY MASS INDEX: 34.93 KG/M2 | WEIGHT: 209.69 LBS | SYSTOLIC BLOOD PRESSURE: 102 MMHG | HEIGHT: 65 IN | OXYGEN SATURATION: 99 % | HEART RATE: 93 BPM

## 2019-05-15 DIAGNOSIS — F41.9 ANXIETY: Primary | ICD-10-CM

## 2019-05-15 DIAGNOSIS — Z01.419 WELL WOMAN EXAM: ICD-10-CM

## 2019-05-15 DIAGNOSIS — R87.619 ABNORMAL CERVICAL PAPANICOLAOU SMEAR, UNSPECIFIED ABNORMAL PAP FINDING: ICD-10-CM

## 2019-05-15 DIAGNOSIS — F32.A DEPRESSION, UNSPECIFIED DEPRESSION TYPE: ICD-10-CM

## 2019-05-15 PROCEDURE — 3008F BODY MASS INDEX DOCD: CPT | Mod: CPTII,S$GLB,, | Performed by: FAMILY MEDICINE

## 2019-05-15 PROCEDURE — 99213 PR OFFICE/OUTPT VISIT, EST, LEVL III, 20-29 MIN: ICD-10-PCS | Mod: S$GLB,,, | Performed by: FAMILY MEDICINE

## 2019-05-15 PROCEDURE — 99999 PR PBB SHADOW E&M-EST. PATIENT-LVL IV: ICD-10-PCS | Mod: PBBFAC,,, | Performed by: FAMILY MEDICINE

## 2019-05-15 PROCEDURE — 3008F PR BODY MASS INDEX (BMI) DOCUMENTED: ICD-10-PCS | Mod: CPTII,S$GLB,, | Performed by: FAMILY MEDICINE

## 2019-05-15 PROCEDURE — 99999 PR PBB SHADOW E&M-EST. PATIENT-LVL IV: CPT | Mod: PBBFAC,,, | Performed by: FAMILY MEDICINE

## 2019-05-15 PROCEDURE — 99213 OFFICE O/P EST LOW 20 MIN: CPT | Mod: S$GLB,,, | Performed by: FAMILY MEDICINE

## 2019-05-15 RX ORDER — SERTRALINE HYDROCHLORIDE 50 MG/1
50 TABLET, FILM COATED ORAL DAILY
Qty: 90 TABLET | Refills: 2 | Status: SHIPPED | OUTPATIENT
Start: 2019-05-15 | End: 2020-01-07 | Stop reason: SDUPTHER

## 2019-05-15 RX ORDER — NORETHINDRONE 5 MG/1
TABLET ORAL
Refills: 11 | COMMUNITY
Start: 2019-04-28 | End: 2019-05-27

## 2019-05-15 NOTE — PATIENT INSTRUCTIONS
No TV in bedroom  Sleep hygiene   Exercise 3 x/week  Make plans at least once a week with a friend  Journal - 3 good things  Every bad day is a good day to journal  Consider Counseling visit in the future  Continue SSRI    Books to Read:  The Feeling Good Handbook by Marcos Vance  What you can change and what you can't by Amadou Bryan    Follow up in 6 months for a physical (beginning of February).

## 2019-05-15 NOTE — Clinical Note
HI, Patient had LSIL. Needs to see you again for a colpo. Her number is [phone]. She will be waiting for a call. She works at 2 PM today. Thanks!Endy

## 2019-05-15 NOTE — PROGRESS NOTES
Subjective:       Patient ID: Christianne Faria is a 28 y.o. female.    Chief Complaint: Follow-up    Christianne is a 28 y.o. female who presents today for follow up on her anxiety and weight.     She is now working at [employer] now. In the [employer] dept. She used to work at [employer].     She is here to follow up on anxiety. Her anxiety has been well controlled. She is taking zoloft 50 mg. Her boyfriend is not here today; he felt tired today.     She has lost 9 pounds. She has been eating more broccoli and chicken. No soda intake. A little bit of exercise. Mostly just diet changes and increased water intake.     She had an abnormal pap in 5/2018 but hasn't returned to see gyn for a colpo.     Anxiety   Presents for follow-up visit. Symptoms include muscle tension, nervous/anxious behavior, restlessness and shortness of breath. Patient reports no chest pain, compulsions, confusion, decreased concentration, depressed mood, dizziness, dry mouth, excessive worry, feeling of choking, hyperventilation, impotence, insomnia, irritability, malaise, nausea, obsessions, palpitations, panic or suicidal ideas. Symptoms occur occasionally. The severity of symptoms is moderate. The patient sleeps 8 hours per night. The quality of sleep is good. Nighttime awakenings: none.     Compliance with medications is %.     Review of Systems   Constitutional: Negative for activity change, irritability and unexpected weight change.   HENT: Negative for hearing loss, rhinorrhea and trouble swallowing.    Eyes: Negative for discharge and visual disturbance.   Respiratory: Positive for shortness of breath. Negative for chest tightness and wheezing.    Cardiovascular: Negative for chest pain and palpitations.   Gastrointestinal: Negative for blood in stool, constipation, diarrhea, nausea and vomiting.   Endocrine: Negative for polydipsia and polyuria.   Genitourinary: Negative for difficulty urinating, dysuria, hematuria, impotence and  menstrual problem.   Musculoskeletal: Negative for arthralgias, joint swelling and neck pain.   Neurological: Negative for dizziness, weakness and headaches.   Psychiatric/Behavioral: Negative for confusion, decreased concentration, dysphoric mood and suicidal ideas. The patient is nervous/anxious. The patient does not have insomnia.          Objective:     Vitals:    05/15/19 1029   BP: 102/64   Pulse: 93   Temp: 98.7 °F (37.1 °C)        Physical Exam   Constitutional: She is oriented to person, place, and time. She appears well-developed and well-nourished. No distress.   HENT:   Head: Normocephalic and atraumatic.   Neck: Normal range of motion. Neck supple.   Cardiovascular: Normal rate and regular rhythm.   Pulmonary/Chest: Effort normal and breath sounds normal.   Musculoskeletal: She exhibits no edema.   Neurological: She is alert and oriented to person, place, and time.   Skin: Skin is warm. She is not diaphoretic.   Psychiatric: She has a normal mood and affect. Her behavior is normal. Judgment and thought content normal.   Nursing note and vitals reviewed.      Assessment:       1. Anxiety    2. Depression, unspecified depression type    3. BMI 34.0-34.9,adult    4. Abnormal cervical Papanicolaou smear, unspecified abnormal pap finding    5. Well woman exam        Plan:       Follow up in 6-7 months for a physical  Labs prior  Continue good eating habits  Follow up with gynecology    Anxiety  -     sertraline (ZOLOFT) 50 MG tablet; Take 1 tablet (50 mg total) by mouth once daily.  Dispense: 90 tablet; Refill: 2    Depression, unspecified depression type  -     sertraline (ZOLOFT) 50 MG tablet; Take 1 tablet (50 mg total) by mouth once daily.  Dispense: 90 tablet; Refill: 2    BMI 34.0-34.9,adult    Abnormal cervical Papanicolaou smear, unspecified abnormal pap finding  -     Ambulatory referral to Obstetrics / Gynecology    Well woman exam  -     CBC auto differential; Future; Expected date: 05/15/2019  -      Comprehensive metabolic panel; Future; Expected date: 05/15/2019  -     Lipid panel; Future; Expected date: 05/15/2019  -     TSH; Future; Expected date: 05/15/2019  -     Vitamin D; Future; Expected date: 05/15/2019  -     Hemoglobin A1c; Future; Expected date: 05/15/2019        Warning signs discussed, patient to call with any further issues or worsening of symptoms.

## 2019-05-15 NOTE — TELEPHONE ENCOUNTER
----- Message from Katharina Quick MD sent at 5/15/2019 12:03 PM CDT -----  Can we get her scheduled for a pap and colpo same visit. She had LSIL pap 5/2018 but never followed up for colpo    Dr. Quick  ----- Message -----  From: Endy Draper DO  Sent: 5/15/2019  10:51 AM  To: Katharina Quick MD    HI,   Patient had LSIL. Needs to see you again for a colpo. Her number is [phone]. She will be waiting for a call. She works at 2 PM today. Thanks!    Endy

## 2019-05-15 NOTE — TELEPHONE ENCOUNTER
----- Message from Endy Draper DO sent at 5/15/2019 10:51 AM CDT -----  HI,   Patient had LSIL. Needs to see you again for a colpo. Her number is [phone]. She will be waiting for a call. She works at 2 PM today. Thanks!    Endy

## 2019-05-27 RX ORDER — ACETAMINOPHEN AND CODEINE PHOSPHATE 120; 12 MG/5ML; MG/5ML
1 SOLUTION ORAL DAILY
Qty: 30 TABLET | Refills: 11 | Status: SHIPPED | OUTPATIENT
Start: 2019-05-27 | End: 2020-04-21 | Stop reason: SDUPTHER

## 2019-06-05 ENCOUNTER — PROCEDURE VISIT (OUTPATIENT)
Dept: OBSTETRICS AND GYNECOLOGY | Facility: CLINIC | Age: 29
End: 2019-06-05
Payer: COMMERCIAL

## 2019-06-05 VITALS
HEIGHT: 65 IN | WEIGHT: 207.13 LBS | BODY MASS INDEX: 34.51 KG/M2 | DIASTOLIC BLOOD PRESSURE: 62 MMHG | SYSTOLIC BLOOD PRESSURE: 104 MMHG

## 2019-06-05 DIAGNOSIS — R87.612 LGSIL ON PAP SMEAR OF CERVIX: Primary | ICD-10-CM

## 2019-06-05 PROCEDURE — 57454 BX/CURETT OF CERVIX W/SCOPE: CPT | Mod: S$GLB,,, | Performed by: OBSTETRICS & GYNECOLOGY

## 2019-06-05 PROCEDURE — 88175 CYTOPATH C/V AUTO FLUID REDO: CPT

## 2019-06-05 PROCEDURE — 57454 PR COLPOSC,CERVIX W/ADJ VAG,W/BX & CURRETAG: ICD-10-PCS | Mod: S$GLB,,, | Performed by: OBSTETRICS & GYNECOLOGY

## 2019-06-05 PROCEDURE — 88305 TISSUE EXAM BY PATHOLOGIST: CPT | Performed by: PATHOLOGY

## 2019-06-05 PROCEDURE — 88305 TISSUE SPECIMEN TO PATHOLOGY, OBSTETRICS/GYNECOLOGY: ICD-10-PCS | Mod: 26,,, | Performed by: PATHOLOGY

## 2019-06-05 NOTE — PROCEDURES
Procedures   COLPOSCOPY:    Christianne Faria is a 28 y.o. female   presents for colposcopy.  Patient's last menstrual period was 2019..  Her most recent pap smear shows low-grade squamous intraepithelial neoplasia     The abnormal test findings were discussed, as well as HPV infection, need for colposcopy and possible biopsies to determine the plan of care, treatments available, the minimal risk of bleeding and infection with colposcopy, and alternatives to colposcopy and she agrees to proceed.      UPT is negative    COLPOSCOPY EXAM:   TIME OUT PERFORMED.     acetowhite lesion(s) noted at 7 o'clock    The speculum was placed and cervix adequately visualized. Acetic acid applied to the cervix and findings as listed above. Biopsy was taken at 12 and 7 o'clock.  ECC was performed. Hemostasis was adequate with application of Monsel's solution. The speculum was removed.The patient did tolerate the procedure well.    All collected specimens sent to pathology for histologic analysis.    Post-colposcopy counseling:  The patient was instructed to manage post-colposcopy cramping with NSAIDs or Tylenol, or with a prescription per the medication card.  Avoid intercourse, douching, or tampons in the vagina for at least 2-3 days.  Expect a clumpy blackish discharge due to Monsel's solution application for several days.  Report heavy bleeding, worsening pain or pain that does not respond to above medications, or foul-smelling vaginal discharge. HPV vaccine recommended according to FDA age guidelines.  Importance of follow-up stressed.      Follow up pending results    Katharina Quick MD, FACOG  OB/GYN  Pager: [phone]

## 2019-08-30 ENCOUNTER — TELEPHONE (OUTPATIENT)
Dept: FAMILY MEDICINE | Facility: CLINIC | Age: 29
End: 2019-08-30

## 2019-08-30 ENCOUNTER — PATIENT MESSAGE (OUTPATIENT)
Dept: FAMILY MEDICINE | Facility: CLINIC | Age: 29
End: 2019-08-30

## 2019-08-30 DIAGNOSIS — R09.81 NASAL CONGESTION: Primary | ICD-10-CM

## 2019-08-30 RX ORDER — FLUTICASONE PROPIONATE 50 MCG
2 SPRAY, SUSPENSION (ML) NASAL DAILY
Qty: 16 G | Refills: 12 | Status: SHIPPED | OUTPATIENT
Start: 2019-08-30 | End: 2019-09-29

## 2020-01-07 ENCOUNTER — OFFICE VISIT (OUTPATIENT)
Dept: FAMILY MEDICINE | Facility: CLINIC | Age: 30
End: 2020-01-07
Payer: COMMERCIAL

## 2020-01-07 ENCOUNTER — LAB VISIT (OUTPATIENT)
Dept: LAB | Facility: HOSPITAL | Age: 30
End: 2020-01-07
Attending: FAMILY MEDICINE
Payer: COMMERCIAL

## 2020-01-07 VITALS
DIASTOLIC BLOOD PRESSURE: 60 MMHG | HEART RATE: 94 BPM | SYSTOLIC BLOOD PRESSURE: 96 MMHG | WEIGHT: 213.88 LBS | TEMPERATURE: 99 F | OXYGEN SATURATION: 98 % | BODY MASS INDEX: 35.63 KG/M2 | HEIGHT: 65 IN

## 2020-01-07 DIAGNOSIS — Z01.419 WELL WOMAN EXAM: ICD-10-CM

## 2020-01-07 DIAGNOSIS — Z01.419 WELL WOMAN EXAM: Primary | ICD-10-CM

## 2020-01-07 DIAGNOSIS — F41.9 ANXIETY: ICD-10-CM

## 2020-01-07 DIAGNOSIS — F33.41 RECURRENT MAJOR DEPRESSIVE DISORDER, IN PARTIAL REMISSION: ICD-10-CM

## 2020-01-07 DIAGNOSIS — K21.9 GASTROESOPHAGEAL REFLUX DISEASE, ESOPHAGITIS PRESENCE NOT SPECIFIED: ICD-10-CM

## 2020-01-07 LAB
BASOPHILS # BLD AUTO: 0.05 K/UL (ref 0–0.2)
BASOPHILS NFR BLD: 0.5 % (ref 0–1.9)
DIFFERENTIAL METHOD: ABNORMAL
EOSINOPHIL # BLD AUTO: 0.1 K/UL (ref 0–0.5)
EOSINOPHIL NFR BLD: 1.3 % (ref 0–8)
ERYTHROCYTE [DISTWIDTH] IN BLOOD BY AUTOMATED COUNT: 14.1 % (ref 11.5–14.5)
HCT VFR BLD AUTO: 38 % (ref 37–48.5)
HGB BLD-MCNC: 11.3 G/DL (ref 12–16)
IMM GRANULOCYTES # BLD AUTO: 0.02 K/UL (ref 0–0.04)
IMM GRANULOCYTES NFR BLD AUTO: 0.2 % (ref 0–0.5)
LYMPHOCYTES # BLD AUTO: 2.4 K/UL (ref 1–4.8)
LYMPHOCYTES NFR BLD: 25.6 % (ref 18–48)
MCH RBC QN AUTO: 25.6 PG (ref 27–31)
MCHC RBC AUTO-ENTMCNC: 29.7 G/DL (ref 32–36)
MCV RBC AUTO: 86 FL (ref 82–98)
MONOCYTES # BLD AUTO: 0.5 K/UL (ref 0.3–1)
MONOCYTES NFR BLD: 4.7 % (ref 4–15)
NEUTROPHILS # BLD AUTO: 6.4 K/UL (ref 1.8–7.7)
NEUTROPHILS NFR BLD: 67.7 % (ref 38–73)
NRBC BLD-RTO: 0 /100 WBC
PLATELET # BLD AUTO: 351 K/UL (ref 150–350)
PMV BLD AUTO: 11.6 FL (ref 9.2–12.9)
RBC # BLD AUTO: 4.41 M/UL (ref 4–5.4)
WBC # BLD AUTO: 9.51 K/UL (ref 3.9–12.7)

## 2020-01-07 PROCEDURE — 84443 ASSAY THYROID STIM HORMONE: CPT

## 2020-01-07 PROCEDURE — 99999 PR PBB SHADOW E&M-EST. PATIENT-LVL IV: ICD-10-PCS | Mod: PBBFAC,,, | Performed by: FAMILY MEDICINE

## 2020-01-07 PROCEDURE — 83036 HEMOGLOBIN GLYCOSYLATED A1C: CPT

## 2020-01-07 PROCEDURE — 80053 COMPREHEN METABOLIC PANEL: CPT

## 2020-01-07 PROCEDURE — 99395 PR PREVENTIVE VISIT,EST,18-39: ICD-10-PCS | Mod: S$GLB,,, | Performed by: FAMILY MEDICINE

## 2020-01-07 PROCEDURE — 82306 VITAMIN D 25 HYDROXY: CPT

## 2020-01-07 PROCEDURE — 99999 PR PBB SHADOW E&M-EST. PATIENT-LVL IV: CPT | Mod: PBBFAC,,, | Performed by: FAMILY MEDICINE

## 2020-01-07 PROCEDURE — 80061 LIPID PANEL: CPT

## 2020-01-07 PROCEDURE — 85025 COMPLETE CBC W/AUTO DIFF WBC: CPT

## 2020-01-07 PROCEDURE — 36415 COLL VENOUS BLD VENIPUNCTURE: CPT | Mod: PO

## 2020-01-07 PROCEDURE — 99395 PREV VISIT EST AGE 18-39: CPT | Mod: S$GLB,,, | Performed by: FAMILY MEDICINE

## 2020-01-07 RX ORDER — BUPROPION HYDROCHLORIDE 150 MG/1
150 TABLET ORAL DAILY
Qty: 90 TABLET | Refills: 0 | Status: SHIPPED | OUTPATIENT
Start: 2020-01-07 | End: 2020-04-06 | Stop reason: SDUPTHER

## 2020-01-07 RX ORDER — DIAZEPAM 2 MG/1
2 TABLET ORAL EVERY 12 HOURS PRN
Qty: 20 TABLET | Refills: 0 | Status: SHIPPED | OUTPATIENT
Start: 2020-01-07 | End: 2021-04-16

## 2020-01-07 RX ORDER — SERTRALINE HYDROCHLORIDE 25 MG/1
TABLET, FILM COATED ORAL
Qty: 30 TABLET | Refills: 0 | Status: SHIPPED | OUTPATIENT
Start: 2020-01-07 | End: 2020-04-13

## 2020-01-07 NOTE — PATIENT INSTRUCTIONS
Titrate off zoloft  zoloft 25 mg x 2 weeks. zoloft 25 mg every other day x 2 weeks.  Start wellbutrin  Start (short term) for panic attacks while changing medications  Stop smoking marijuana  Labs today  Ask Adama mo) to bring food x 2 weeks. In the next two weeks, plan 3-4 simple meals that you can make and plan and cook them    Start exercising.     ?Avoid tobacco  ?Be physically active  ?Maintain a healthy weight  ?Eat a diet rich in fruits, vegetables, and whole grains, and low in saturated/trans fat  ?Limit alcohol consumption  ?Protect against sexually transmitted infections  ?Avoid excess sun

## 2020-01-07 NOTE — PROGRESS NOTES
Subjective:       Patient ID: Christianne Faria is a 29 y.o. female.    Chief Complaint: Annual Exam    Christianne Faria is a 29 y.o. female who presents for a physical    She appeared to be more flat then previous visits. She started crying when asked further questions. She has been having lots of panic attacks. When she gets panic attack, she gets nausea, and occasional vomiting. Upon further questioning, she reports that emesis is associated with marijuana use. Symptoms have been ongoing for 1 month. Unclear what changed. Feels safe at home and at work. Good support with BF. No trauma that she is aware of. Does report compliance with zoloft but states that it has caused her to have decreased sexual desire.     Diet/Exercise: she hasn't been exercising. She hasn't been following a diet. She was exercising, but work got hectic.     Flu: declined  Tdap: declined  Labs: Ordered  Pap: UTD     PMHx: reviewed in EMR  SHX: reviewed in EMR  FMHx: no family history of colon cancer, breast cancer, ovarian cancer  Social: 6 siblings. Currently living with her BF and his parents.  There are three cats and a dog at home. No smokers at home. She is working at [employer]. She is happier. Has boyfriend x about 3 year. On OCP.        Review of Systems   Constitutional: Negative for activity change and unexpected weight change.   HENT: Negative for hearing loss, rhinorrhea and trouble swallowing.    Eyes: Negative for discharge and visual disturbance.   Respiratory: Negative for chest tightness and wheezing.    Cardiovascular: Negative for chest pain and palpitations.   Gastrointestinal: Positive for vomiting. Negative for blood in stool, constipation and diarrhea.   Endocrine: Negative for polydipsia and polyuria.   Genitourinary: Negative for difficulty urinating, dysuria, hematuria and menstrual problem.   Musculoskeletal: Positive for neck pain. Negative for arthralgias and joint swelling.   Neurological: Negative for weakness and  headaches.   Psychiatric/Behavioral: Negative for confusion and dysphoric mood.           Objective:     Vitals:    01/07/20 1611   BP: 96/60   Pulse: 94   Temp: 98.8 °F (37.1 °C)        Physical Exam   Constitutional: She is oriented to person, place, and time. She appears well-developed and well-nourished. No distress.   Tearful at time throughout the appointment.    HENT:   Head: Normocephalic and atraumatic.   Neck: Normal range of motion. Neck supple.   Cardiovascular: Normal rate and regular rhythm.   Pulmonary/Chest: Effort normal and breath sounds normal.   Abdominal: Soft. There is no tenderness.   Musculoskeletal: She exhibits no edema.   Neurological: She is alert and oriented to person, place, and time.   Skin: Skin is warm. She is not diaphoretic.   Psychiatric: She has a normal mood and affect. Her behavior is normal. Judgment and thought content normal.   Nursing note and vitals reviewed.      Assessment:       1. Well woman exam    2. Anxiety    3. Recurrent major depressive disorder, in partial remission    4. BMI 35.0-35.9,adult    5. Gastroesophageal reflux disease, esophagitis presence not specified        Plan:       Titrate off zoloft  zoloft 25 mg x 2 weeks. zoloft 25 mg every other day x 2 weeks.  Start wellbutrin  Start (short term) valium for panic attacks while changing medications  Stop smoking marijuana  Labs today  Ask Adama mo) to bring food x 2 weeks. In the next two weeks, plan 3-4 simple meals that you can make and plan and cook them  F/u in 6-8 weeks    Start exercising.       Well woman exam  ?Avoid tobacco  ?Be physically active  ?Maintain a healthy weight  ?Eat a diet rich in fruits, vegetables, and whole grains, and low in saturated/trans fat  ?Limit alcohol consumption  ?Protect against sexually transmitted infections  ?Avoid excess sun  Labs today    Anxiety  -     buPROPion (WELLBUTRIN XL) 150 MG TB24 tablet; Take 1 tablet (150 mg total) by mouth once daily.  Dispense:  90 tablet; Refill: 0  -     sertraline (ZOLOFT) 25 MG tablet; Take 1 tablet (25 mg total) by mouth every evening for 14 days, THEN 1 tablet (25 mg total) every other day for 14 days.  Dispense: 30 tablet; Refill: 0  -     Ambulatory referral to Social Work  -     diazePAM (VALIUM) 2 MG tablet; Take 1 tablet (2 mg total) by mouth every 12 (twelve) hours as needed for Anxiety.  Dispense: 20 tablet; Refill: 0    Recurrent major depressive disorder, in partial remission  -     buPROPion (WELLBUTRIN XL) 150 MG TB24 tablet; Take 1 tablet (150 mg total) by mouth once daily.  Dispense: 90 tablet; Refill: 0  -     sertraline (ZOLOFT) 25 MG tablet; Take 1 tablet (25 mg total) by mouth every evening for 14 days, THEN 1 tablet (25 mg total) every other day for 14 days.  Dispense: 30 tablet; Refill: 0  -     Ambulatory referral to Social Work  -     diazePAM (VALIUM) 2 MG tablet; Take 1 tablet (2 mg total) by mouth every 12 (twelve) hours as needed for Anxiety.  Dispense: 20 tablet; Refill: 0    BMI 35.0-35.9,adult    Gastroesophageal reflux disease, esophagitis presence not specified    Warning signs discussed, patient to call with any further issues or worsening of symptoms.

## 2020-01-08 DIAGNOSIS — D64.9 NORMOCYTIC ANEMIA: Primary | ICD-10-CM

## 2020-01-08 LAB
25(OH)D3+25(OH)D2 SERPL-MCNC: 18 NG/ML (ref 30–96)
ALBUMIN SERPL BCP-MCNC: 3.6 G/DL (ref 3.5–5.2)
ALP SERPL-CCNC: 93 U/L (ref 55–135)
ALT SERPL W/O P-5'-P-CCNC: 10 U/L (ref 10–44)
ANION GAP SERPL CALC-SCNC: 7 MMOL/L (ref 8–16)
AST SERPL-CCNC: 15 U/L (ref 10–40)
BILIRUB SERPL-MCNC: 0.5 MG/DL (ref 0.1–1)
BUN SERPL-MCNC: 14 MG/DL (ref 6–20)
CALCIUM SERPL-MCNC: 9 MG/DL (ref 8.7–10.5)
CHLORIDE SERPL-SCNC: 106 MMOL/L (ref 95–110)
CHOLEST SERPL-MCNC: 151 MG/DL (ref 120–199)
CHOLEST/HDLC SERPL: 4.6 {RATIO} (ref 2–5)
CO2 SERPL-SCNC: 25 MMOL/L (ref 23–29)
CREAT SERPL-MCNC: 0.9 MG/DL (ref 0.5–1.4)
EST. GFR  (AFRICAN AMERICAN): >60 ML/MIN/1.73 M^2
EST. GFR  (NON AFRICAN AMERICAN): >60 ML/MIN/1.73 M^2
ESTIMATED AVG GLUCOSE: 105 MG/DL (ref 68–131)
GLUCOSE SERPL-MCNC: 94 MG/DL (ref 70–110)
HBA1C MFR BLD HPLC: 5.3 % (ref 4–5.6)
HDLC SERPL-MCNC: 33 MG/DL (ref 40–75)
HDLC SERPL: 21.9 % (ref 20–50)
LDLC SERPL CALC-MCNC: 78.8 MG/DL (ref 63–159)
NONHDLC SERPL-MCNC: 118 MG/DL
POTASSIUM SERPL-SCNC: 4.2 MMOL/L (ref 3.5–5.1)
PROT SERPL-MCNC: 7.3 G/DL (ref 6–8.4)
SODIUM SERPL-SCNC: 138 MMOL/L (ref 136–145)
TRIGL SERPL-MCNC: 196 MG/DL (ref 30–150)
TSH SERPL DL<=0.005 MIU/L-ACNC: 1.22 UIU/ML (ref 0.4–4)

## 2020-02-06 DIAGNOSIS — F33.41 RECURRENT MAJOR DEPRESSIVE DISORDER, IN PARTIAL REMISSION: ICD-10-CM

## 2020-02-06 DIAGNOSIS — F41.9 ANXIETY: ICD-10-CM

## 2020-02-06 RX ORDER — SERTRALINE HYDROCHLORIDE 25 MG/1
TABLET, FILM COATED ORAL
Qty: 30 TABLET | Refills: 0 | OUTPATIENT
Start: 2020-02-06 | End: 2020-03-05

## 2020-04-06 DIAGNOSIS — F33.41 RECURRENT MAJOR DEPRESSIVE DISORDER, IN PARTIAL REMISSION: ICD-10-CM

## 2020-04-06 DIAGNOSIS — F41.9 ANXIETY: ICD-10-CM

## 2020-04-06 RX ORDER — BUPROPION HYDROCHLORIDE 150 MG/1
150 TABLET ORAL DAILY
Qty: 90 TABLET | Refills: 0 | Status: SHIPPED | OUTPATIENT
Start: 2020-04-06 | End: 2020-04-13 | Stop reason: SDUPTHER

## 2020-04-13 ENCOUNTER — OFFICE VISIT (OUTPATIENT)
Dept: FAMILY MEDICINE | Facility: CLINIC | Age: 30
End: 2020-04-13
Payer: COMMERCIAL

## 2020-04-13 DIAGNOSIS — F41.9 ANXIETY: Primary | ICD-10-CM

## 2020-04-13 DIAGNOSIS — D64.9 NORMOCYTIC ANEMIA: ICD-10-CM

## 2020-04-13 DIAGNOSIS — F33.41 RECURRENT MAJOR DEPRESSIVE DISORDER, IN PARTIAL REMISSION: ICD-10-CM

## 2020-04-13 DIAGNOSIS — F41.0 PANIC ATTACK: ICD-10-CM

## 2020-04-13 PROCEDURE — 99214 OFFICE O/P EST MOD 30 MIN: CPT | Mod: 95,,, | Performed by: FAMILY MEDICINE

## 2020-04-13 PROCEDURE — 99214 PR OFFICE/OUTPT VISIT, EST, LEVL IV, 30-39 MIN: ICD-10-PCS | Mod: 95,,, | Performed by: FAMILY MEDICINE

## 2020-04-13 RX ORDER — BUPROPION HYDROCHLORIDE 150 MG/1
150 TABLET ORAL DAILY
Qty: 90 TABLET | Refills: 1 | Status: SHIPPED | OUTPATIENT
Start: 2020-04-13 | End: 2020-10-16 | Stop reason: SDUPTHER

## 2020-04-13 NOTE — PATIENT INSTRUCTIONS
Continue not taking Zoloft  Discussed the complex nature of sexual desire  At this point continue Wellbutrin  Can continue to take Valium for panic attacks as long use remains very rare.  May consider refilling depending on use  Continue not smoking marijuana  Continue trying to exercise and make diet changes  Follow-up in 6 months for anxiety/depression.  Labs prior to follow-up anemia

## 2020-04-13 NOTE — PROGRESS NOTES
"Subjective:       Patient ID: Christianne Faria is a 29 y.o. female.    Chief Complaint: anxiety/meds    The patient location is: her home  The chief complaint leading to consultation is: anxiety/meds  Visit type: Virtual visit with synchronous audio and video  Total time spent with patient: aprox 16 minutes  Each patient to whom he or she provides medical services by telemedicine is:  (1) informed of the relationship between the physician and patient and the respective role of any other health care provider with respect to management of the patient; and (2) notified that he or she may decline to receive medical services by telemedicine and may withdraw from such care at any time.    Christianne is a 29 y.o. female who presents today for follow up on her anxiety. She has been doing "up and down" since then. She has been walking in the evening with her boyfriend. She has been trying. She is off the zoloft. This was due to sexual side effects. She was placed on wellbutrin. She states that zoloft was helping more but the side effects were too much. The wellbutrin is helping minimally with her panic. It does help her with coping with her anxiety and/or panic attacks. She has been having panic attacks about once a month. She thinks that wellbutrin is helping her at work. In general her mood is stable. Her sleep is "like a log." Not waking up at night. Sexual side effects are 50% better without zoloft.     Was given 20 pills of valium, has taken aprox 4.       Anxiety   Presents for follow-up visit. Symptoms include panic. Patient reports no chest pain, confusion, decreased concentration, depressed mood, excessive worry, insomnia, irritability, nervous/anxious behavior, palpitations or restlessness. The quality of sleep is good. Nighttime awakenings: none.     Compliance with medications is %.     Review of Systems   Constitutional: Positive for activity change. Negative for irritability and unexpected weight change. "   HENT: Negative for hearing loss, rhinorrhea and trouble swallowing.    Eyes: Negative for discharge and visual disturbance.   Respiratory: Negative for chest tightness and wheezing.    Cardiovascular: Negative for chest pain and palpitations.   Gastrointestinal: Negative for blood in stool, constipation, diarrhea and vomiting.   Endocrine: Negative for polydipsia and polyuria.   Genitourinary: Negative for difficulty urinating, dysuria, hematuria and menstrual problem.   Musculoskeletal: Positive for neck pain. Negative for arthralgias and joint swelling.   Neurological: Negative for weakness and headaches.   Psychiatric/Behavioral: Negative for confusion, decreased concentration and dysphoric mood. The patient is not nervous/anxious and does not have insomnia.                Objective:   There were no vitals filed for this visit.   Exam limited due to inherent nature of telemedicine visit  Physical Exam   Constitutional: She is oriented to person, place, and time. She appears well-developed and well-nourished. No distress.   Smiling, laughing, during the visit   HENT:   Head: Normocephalic and atraumatic.   Eyes: Conjunctivae are normal.   Pulmonary/Chest: No respiratory distress.   Neurological: She is alert and oriented to person, place, and time.   Skin: She is not diaphoretic.   Psychiatric: She has a normal mood and affect. Her behavior is normal. Judgment and thought content normal.       Assessment:       1. Anxiety    2. Recurrent major depressive disorder, in partial remission    3. Panic attack    4. Normocytic anemia        Plan:       Continue not taking Zoloft  Discussed the complex nature of sexual desire  At this point continue Wellbutrin  Can continue to take Valium for panic attacks as long use remains very rare.  May consider refilling depending on use  Continue not smoking marijuana  Continue trying to exercise and make diet changes  Follow-up in 6 months for anxiety/depression.  Labs prior to  follow-up anemia    Anxiety  -     buPROPion (WELLBUTRIN XL) 150 MG TB24 tablet; Take 1 tablet (150 mg total) by mouth once daily.  Dispense: 90 tablet; Refill: 1    Recurrent major depressive disorder, in partial remission  -     buPROPion (WELLBUTRIN XL) 150 MG TB24 tablet; Take 1 tablet (150 mg total) by mouth once daily.  Dispense: 90 tablet; Refill: 1    Panic attack  -     buPROPion (WELLBUTRIN XL) 150 MG TB24 tablet; Take 1 tablet (150 mg total) by mouth once daily.  Dispense: 90 tablet; Refill: 1    Normocytic anemia  -     CBC auto differential; Future; Expected date: 04/13/2020  -     Ferritin; Future; Expected date: 04/13/2020  -     Iron and TIBC; Future; Expected date: 04/13/2020        Warning signs discussed, patient to call with any further issues or worsening of symptoms.

## 2020-04-21 RX ORDER — ACETAMINOPHEN AND CODEINE PHOSPHATE 120; 12 MG/5ML; MG/5ML
1 SOLUTION ORAL DAILY
Qty: 30 TABLET | Refills: 11 | Status: SHIPPED | OUTPATIENT
Start: 2020-04-21 | End: 2020-04-21

## 2020-04-21 RX ORDER — ACETAMINOPHEN AND CODEINE PHOSPHATE 120; 12 MG/5ML; MG/5ML
1 SOLUTION ORAL DAILY
Qty: 30 TABLET | Refills: 11 | Status: SHIPPED | OUTPATIENT
Start: 2020-04-21 | End: 2021-04-16

## 2020-10-13 ENCOUNTER — LAB VISIT (OUTPATIENT)
Dept: LAB | Facility: HOSPITAL | Age: 30
End: 2020-10-13
Attending: FAMILY MEDICINE
Payer: COMMERCIAL

## 2020-10-13 DIAGNOSIS — D64.9 NORMOCYTIC ANEMIA: ICD-10-CM

## 2020-10-13 LAB
BASOPHILS # BLD AUTO: 0.07 K/UL (ref 0–0.2)
BASOPHILS NFR BLD: 0.7 % (ref 0–1.9)
DIFFERENTIAL METHOD: ABNORMAL
EOSINOPHIL # BLD AUTO: 0.2 K/UL (ref 0–0.5)
EOSINOPHIL NFR BLD: 1.6 % (ref 0–8)
ERYTHROCYTE [DISTWIDTH] IN BLOOD BY AUTOMATED COUNT: 14.8 % (ref 11.5–14.5)
FERRITIN SERPL-MCNC: 11 NG/ML (ref 20–300)
HCT VFR BLD AUTO: 39 % (ref 37–48.5)
HGB BLD-MCNC: 11.7 G/DL (ref 12–16)
IMM GRANULOCYTES # BLD AUTO: 0.03 K/UL (ref 0–0.04)
IMM GRANULOCYTES NFR BLD AUTO: 0.3 % (ref 0–0.5)
IRON SERPL-MCNC: 63 UG/DL (ref 30–160)
LYMPHOCYTES # BLD AUTO: 2.3 K/UL (ref 1–4.8)
LYMPHOCYTES NFR BLD: 23.8 % (ref 18–48)
MCH RBC QN AUTO: 26.2 PG (ref 27–31)
MCHC RBC AUTO-ENTMCNC: 30 G/DL (ref 32–36)
MCV RBC AUTO: 87 FL (ref 82–98)
MONOCYTES # BLD AUTO: 0.6 K/UL (ref 0.3–1)
MONOCYTES NFR BLD: 6.2 % (ref 4–15)
NEUTROPHILS # BLD AUTO: 6.6 K/UL (ref 1.8–7.7)
NEUTROPHILS NFR BLD: 67.4 % (ref 38–73)
NRBC BLD-RTO: 0 /100 WBC
PLATELET # BLD AUTO: 326 K/UL (ref 150–350)
PMV BLD AUTO: 11.1 FL (ref 9.2–12.9)
RBC # BLD AUTO: 4.46 M/UL (ref 4–5.4)
SATURATED IRON: 14 % (ref 20–50)
TOTAL IRON BINDING CAPACITY: 443 UG/DL (ref 250–450)
TRANSFERRIN SERPL-MCNC: 299 MG/DL (ref 200–375)
WBC # BLD AUTO: 9.83 K/UL (ref 3.9–12.7)

## 2020-10-13 PROCEDURE — 82728 ASSAY OF FERRITIN: CPT

## 2020-10-13 PROCEDURE — 83540 ASSAY OF IRON: CPT

## 2020-10-13 PROCEDURE — 36415 COLL VENOUS BLD VENIPUNCTURE: CPT | Mod: PO

## 2020-10-13 PROCEDURE — 85025 COMPLETE CBC W/AUTO DIFF WBC: CPT

## 2020-10-16 ENCOUNTER — OFFICE VISIT (OUTPATIENT)
Dept: FAMILY MEDICINE | Facility: CLINIC | Age: 30
End: 2020-10-16
Payer: COMMERCIAL

## 2020-10-16 VITALS
HEART RATE: 100 BPM | WEIGHT: 217.38 LBS | OXYGEN SATURATION: 98 % | BODY MASS INDEX: 36.17 KG/M2 | TEMPERATURE: 98 F | SYSTOLIC BLOOD PRESSURE: 110 MMHG | DIASTOLIC BLOOD PRESSURE: 70 MMHG

## 2020-10-16 DIAGNOSIS — F41.9 ANXIETY: Primary | ICD-10-CM

## 2020-10-16 DIAGNOSIS — D64.9 NORMOCYTIC ANEMIA: ICD-10-CM

## 2020-10-16 DIAGNOSIS — Z11.59 NEED FOR HEPATITIS C SCREENING TEST: ICD-10-CM

## 2020-10-16 DIAGNOSIS — T14.8XXA BRUISE: ICD-10-CM

## 2020-10-16 DIAGNOSIS — F33.41 RECURRENT MAJOR DEPRESSIVE DISORDER, IN PARTIAL REMISSION: ICD-10-CM

## 2020-10-16 DIAGNOSIS — F41.0 PANIC ATTACK: ICD-10-CM

## 2020-10-16 DIAGNOSIS — E61.1 IRON DEFICIENCY: ICD-10-CM

## 2020-10-16 DIAGNOSIS — Z01.419 WELL WOMAN EXAM: ICD-10-CM

## 2020-10-16 DIAGNOSIS — F32.A DEPRESSION, UNSPECIFIED DEPRESSION TYPE: ICD-10-CM

## 2020-10-16 DIAGNOSIS — Z23 NEED FOR INFLUENZA VACCINATION: ICD-10-CM

## 2020-10-16 PROCEDURE — 99214 OFFICE O/P EST MOD 30 MIN: CPT | Mod: 25,S$GLB,, | Performed by: FAMILY MEDICINE

## 2020-10-16 PROCEDURE — 99999 PR PBB SHADOW E&M-EST. PATIENT-LVL IV: CPT | Mod: PBBFAC,,, | Performed by: FAMILY MEDICINE

## 2020-10-16 PROCEDURE — 99214 PR OFFICE/OUTPT VISIT, EST, LEVL IV, 30-39 MIN: ICD-10-PCS | Mod: 25,S$GLB,, | Performed by: FAMILY MEDICINE

## 2020-10-16 PROCEDURE — 3008F PR BODY MASS INDEX (BMI) DOCUMENTED: ICD-10-PCS | Mod: CPTII,S$GLB,, | Performed by: FAMILY MEDICINE

## 2020-10-16 PROCEDURE — 3008F BODY MASS INDEX DOCD: CPT | Mod: CPTII,S$GLB,, | Performed by: FAMILY MEDICINE

## 2020-10-16 PROCEDURE — 90471 FLU VACCINE (QUAD) GREATER THAN OR EQUAL TO 3YO PRESERVATIVE FREE IM: ICD-10-PCS | Mod: S$GLB,,, | Performed by: FAMILY MEDICINE

## 2020-10-16 PROCEDURE — 90471 IMMUNIZATION ADMIN: CPT | Mod: S$GLB,,, | Performed by: FAMILY MEDICINE

## 2020-10-16 PROCEDURE — 90686 FLU VACCINE (QUAD) GREATER THAN OR EQUAL TO 3YO PRESERVATIVE FREE IM: ICD-10-PCS | Mod: S$GLB,,, | Performed by: FAMILY MEDICINE

## 2020-10-16 PROCEDURE — 99999 PR PBB SHADOW E&M-EST. PATIENT-LVL IV: ICD-10-PCS | Mod: PBBFAC,,, | Performed by: FAMILY MEDICINE

## 2020-10-16 PROCEDURE — 90686 IIV4 VACC NO PRSV 0.5 ML IM: CPT | Mod: S$GLB,,, | Performed by: FAMILY MEDICINE

## 2020-10-16 RX ORDER — FERROUS SULFATE 324(65)MG
325 TABLET, DELAYED RELEASE (ENTERIC COATED) ORAL 2 TIMES DAILY
Qty: 180 TABLET | Refills: 1 | Status: SHIPPED | OUTPATIENT
Start: 2020-10-16 | End: 2021-04-16 | Stop reason: SDUPTHER

## 2020-10-16 RX ORDER — DOCUSATE SODIUM 100 MG/1
100 CAPSULE, LIQUID FILLED ORAL 2 TIMES DAILY
Qty: 180 CAPSULE | Refills: 1 | Status: SHIPPED | OUTPATIENT
Start: 2020-10-16 | End: 2020-10-26

## 2020-10-16 RX ORDER — BUPROPION HYDROCHLORIDE 150 MG/1
150 TABLET ORAL DAILY
Qty: 90 TABLET | Refills: 1 | Status: SHIPPED | OUTPATIENT
Start: 2020-10-16 | End: 2021-04-16 | Stop reason: SDUPTHER

## 2020-10-16 NOTE — PROGRESS NOTES
"Subjective:       Patient ID: Christianne Faria is a 30 y.o. female.    Chief Complaint: Follow-up (6months), Hypertension, and Anxiety    Christianne is a 30 y.o. female who presents today for f/u on her anxiety. Stopped zoloft due to sexual side effects. Was placed on wellbutrin with PRN valium. Has taken less then 20 since early 2020    Partner is in the room. Newest job is less stressful.     Taking wellbutrin 150 mg.     Hasn't had a panic attack since June. She quit her job in June.     Given 20 pills of valium in January, has about 10 left.     Noted to have iron def anemia. No heavy menses. No blood in the stool. No melena. No hematochezia. No hematemesis. Has chronic constipation. She does bruise, where she hits herself. She is not "overly cautious." doesn't get bruising where she doesn't have trauma. Of note, she does donate blood, intermittently.     Labs as below reviewed in detail.     Review of Systems   Constitutional: Negative for activity change and unexpected weight change.   HENT: Negative for hearing loss, rhinorrhea and trouble swallowing.    Eyes: Negative for discharge and visual disturbance.   Respiratory: Negative for chest tightness and wheezing.    Cardiovascular: Negative for chest pain and palpitations.   Gastrointestinal: Positive for constipation. Negative for blood in stool, diarrhea and vomiting.   Endocrine: Negative for polydipsia and polyuria.   Genitourinary: Negative for difficulty urinating, dysuria, hematuria and menstrual problem.   Musculoskeletal: Positive for neck pain. Negative for arthralgias and joint swelling.   Neurological: Negative for weakness and headaches.   Psychiatric/Behavioral: Negative for confusion and dysphoric mood.             Results for orders placed or performed in visit on 10/13/20   CBC auto differential   Result Value Ref Range    WBC 9.83 3.90 - 12.70 K/uL    RBC 4.46 4.00 - 5.40 M/uL    Hemoglobin 11.7 (L) 12.0 - 16.0 g/dL    Hematocrit 39.0 37.0 - 48.5 " %    MCV 87 82 - 98 fL    MCH 26.2 (L) 27.0 - 31.0 pg    MCHC 30.0 (L) 32.0 - 36.0 g/dL    RDW 14.8 (H) 11.5 - 14.5 %    Platelets 326 150 - 350 K/uL    MPV 11.1 9.2 - 12.9 fL    Immature Granulocytes 0.3 0.0 - 0.5 %    Gran # (ANC) 6.6 1.8 - 7.7 K/uL    Immature Grans (Abs) 0.03 0.00 - 0.04 K/uL    Lymph # 2.3 1.0 - 4.8 K/uL    Mono # 0.6 0.3 - 1.0 K/uL    Eos # 0.2 0.0 - 0.5 K/uL    Baso # 0.07 0.00 - 0.20 K/uL    nRBC 0 0 /100 WBC    Gran % 67.4 38.0 - 73.0 %    Lymph % 23.8 18.0 - 48.0 %    Mono % 6.2 4.0 - 15.0 %    Eosinophil % 1.6 0.0 - 8.0 %    Basophil % 0.7 0.0 - 1.9 %    Differential Method Automated    Ferritin   Result Value Ref Range    Ferritin 11 (L) 20.0 - 300.0 ng/mL   Iron and TIBC   Result Value Ref Range    Iron 63 30 - 160 ug/dL    Transferrin 299 200 - 375 mg/dL    TIBC 443 250 - 450 ug/dL    Saturated Iron 14 (L) 20 - 50 %       Objective:     Vitals:    10/16/20 0911   BP: 110/70   Pulse: 100   Temp: 97.7 °F (36.5 °C)        Physical Exam  Vitals signs and nursing note reviewed.   Constitutional:       General: She is not in acute distress.     Appearance: She is well-developed. She is obese. She is not diaphoretic.   HENT:      Head: Normocephalic and atraumatic.   Neck:      Musculoskeletal: Normal range of motion and neck supple.   Cardiovascular:      Rate and Rhythm: Normal rate and regular rhythm.   Pulmonary:      Effort: Pulmonary effort is normal.      Breath sounds: Normal breath sounds.   Abdominal:      Palpations: Abdomen is soft.   Lymphadenopathy:      Cervical:      Right cervical: No superficial cervical adenopathy.     Left cervical: No superficial cervical adenopathy.      Upper Body:      Right upper body: No supraclavicular, axillary or pectoral adenopathy.      Left upper body: No supraclavicular, axillary or pectoral adenopathy.   Skin:     Findings: No rash.   Neurological:      Mental Status: She is alert and oriented to person, place, and time.   Psychiatric:          Behavior: Behavior normal.         Thought Content: Thought content normal.         Judgment: Judgment normal.         Assessment:       1. Anxiety    2. Depression, unspecified depression type    3. Recurrent major depressive disorder, in partial remission    4. Panic attack    5. Normocytic anemia    6. Iron deficiency    7. Need for influenza vaccination    8. Well woman exam    9. Bruise    10. Need for hepatitis C screening test        Plan:         Continue wellbutrin 150 mg for now  Continue valium PRN  Start iron BID  Take with colace    No causes of iron def noted. Start iron. If still low, consider hematology consultation No blood donation until f/u apt    F/u in 4 months, labs prior. Physical.       Anxiety  -     buPROPion (WELLBUTRIN XL) 150 MG TB24 tablet; Take 1 tablet (150 mg total) by mouth once daily.  Dispense: 90 tablet; Refill: 1    Depression, unspecified depression type    Recurrent major depressive disorder, in partial remission  -     buPROPion (WELLBUTRIN XL) 150 MG TB24 tablet; Take 1 tablet (150 mg total) by mouth once daily.  Dispense: 90 tablet; Refill: 1    Panic attack  -     buPROPion (WELLBUTRIN XL) 150 MG TB24 tablet; Take 1 tablet (150 mg total) by mouth once daily.  Dispense: 90 tablet; Refill: 1    Normocytic anemia  -     FOLATE; Future; Expected date: 10/16/2020    Iron deficiency  -     ferrous sulfate 324 mg (65 mg iron) TbEC; Take 1 tablet (324 mg total) by mouth 2 (two) times daily.  Dispense: 180 tablet; Refill: 1  -     docusate sodium (COLACE) 100 MG capsule; Take 1 capsule (100 mg total) by mouth 2 (two) times daily. for 10 days  Dispense: 180 capsule; Refill: 1  -     Iron and TIBC; Future; Expected date: 10/16/2020  -     Ferritin; Future; Expected date: 10/16/2020  -     FOLATE; Future; Expected date: 10/16/2020    Need for influenza vaccination  -     Influenza - Quadrivalent (PF)    Well woman exam  -     CBC auto differential; Future; Expected date:  10/16/2020  -     Comprehensive Metabolic Panel; Future; Expected date: 10/16/2020  -     Hemoglobin A1C; Future; Expected date: 10/16/2020  -     Lipid Panel; Future; Expected date: 10/16/2020  -     TSH; Future; Expected date: 10/16/2020  -     Vitamin D; Future; Expected date: 10/16/2020  -     Vitamin B12; Future; Expected date: 10/16/2020  -     Iron and TIBC; Future; Expected date: 10/16/2020  -     Ferritin; Future; Expected date: 10/16/2020  -     VITAMIN C; Future; Expected date: 10/16/2020  -     Protime-INR; Future; Expected date: 10/16/2020  -     Hepatitis C Antibody; Future; Expected date: 10/16/2020  -     FOLATE; Future; Expected date: 10/16/2020  -     HIV 1/2 Ag/Ab (4th Gen); Future; Expected date: 10/16/2020    Bruise  -     VITAMIN C; Future; Expected date: 10/16/2020  -     Protime-INR; Future; Expected date: 10/16/2020    Need for hepatitis C screening test  -     Hepatitis C Antibody; Future; Expected date: 10/16/2020            Warning signs discussed, patient to call with any further issues or worsening of symptoms.       Parts of the above note were dictated using a voice dictation software. Please excuse any grammatical or typographical errors.

## 2020-10-16 NOTE — PATIENT INSTRUCTIONS
Continue wellbutrin 150 mg for now  Continue valium PRN  Start iron BID  Take with colace    No causes of iron def noted. Start iron. If still low, consider hematology consultation     No blood donation until f/u apt   Quality 226: Preventive Care And Screening: Tobacco Use: Screening And Cessation Intervention: Patient screened for tobacco use and is an ex/non-smoker Quality 130: Documentation Of Current Medications In The Medical Record: Current Medications with Name, Dosage, Frequency, or Route not Documented, Reason not Given Detail Level: Detailed

## 2020-10-24 ENCOUNTER — PATIENT MESSAGE (OUTPATIENT)
Dept: FAMILY MEDICINE | Facility: CLINIC | Age: 30
End: 2020-10-24

## 2020-11-03 ENCOUNTER — PATIENT MESSAGE (OUTPATIENT)
Dept: FAMILY MEDICINE | Facility: CLINIC | Age: 30
End: 2020-11-03

## 2020-12-15 ENCOUNTER — PATIENT MESSAGE (OUTPATIENT)
Dept: FAMILY MEDICINE | Facility: CLINIC | Age: 30
End: 2020-12-15

## 2020-12-15 ENCOUNTER — PATIENT MESSAGE (OUTPATIENT)
Dept: OBSTETRICS AND GYNECOLOGY | Facility: CLINIC | Age: 30
End: 2020-12-15

## 2021-01-14 ENCOUNTER — PATIENT MESSAGE (OUTPATIENT)
Dept: OTOLARYNGOLOGY | Facility: CLINIC | Age: 31
End: 2021-01-14

## 2021-01-18 ENCOUNTER — PATIENT MESSAGE (OUTPATIENT)
Dept: FAMILY MEDICINE | Facility: CLINIC | Age: 31
End: 2021-01-18

## 2021-01-25 ENCOUNTER — OFFICE VISIT (OUTPATIENT)
Dept: OTOLARYNGOLOGY | Facility: CLINIC | Age: 31
End: 2021-01-25
Payer: MEDICAID

## 2021-01-25 VITALS
BODY MASS INDEX: 34.71 KG/M2 | HEART RATE: 77 BPM | SYSTOLIC BLOOD PRESSURE: 118 MMHG | DIASTOLIC BLOOD PRESSURE: 60 MMHG | WEIGHT: 208.56 LBS

## 2021-01-25 DIAGNOSIS — M27.9 LESION OF MANDIBLE: Primary | ICD-10-CM

## 2021-01-25 PROCEDURE — 99213 PR OFFICE/OUTPT VISIT, EST, LEVL III, 20-29 MIN: ICD-10-PCS | Mod: S$PBB,,, | Performed by: OTOLARYNGOLOGY

## 2021-01-25 PROCEDURE — 99999 PR PBB SHADOW E&M-EST. PATIENT-LVL III: ICD-10-PCS | Mod: PBBFAC,,, | Performed by: OTOLARYNGOLOGY

## 2021-01-25 PROCEDURE — 99213 OFFICE O/P EST LOW 20 MIN: CPT | Mod: PBBFAC | Performed by: OTOLARYNGOLOGY

## 2021-01-25 PROCEDURE — 99213 OFFICE O/P EST LOW 20 MIN: CPT | Mod: S$PBB,,, | Performed by: OTOLARYNGOLOGY

## 2021-01-25 PROCEDURE — 99999 PR PBB SHADOW E&M-EST. PATIENT-LVL III: CPT | Mod: PBBFAC,,, | Performed by: OTOLARYNGOLOGY

## 2021-01-28 ENCOUNTER — HOSPITAL ENCOUNTER (OUTPATIENT)
Dept: RADIOLOGY | Facility: HOSPITAL | Age: 31
Discharge: HOME | End: 2021-01-28
Attending: OTOLARYNGOLOGY
Payer: MEDICAID

## 2021-01-28 DIAGNOSIS — M27.9 LESION OF MANDIBLE: ICD-10-CM

## 2021-01-28 PROCEDURE — 70543 MRI ORBT/FAC/NCK W/O &W/DYE: CPT | Mod: 26,,, | Performed by: RADIOLOGY

## 2021-01-28 PROCEDURE — 25500020 PHARM REV CODE 255: Performed by: OTOLARYNGOLOGY

## 2021-01-28 PROCEDURE — 70543 MRI ORBT/FAC/NCK W/O &W/DYE: CPT | Mod: TC

## 2021-01-28 PROCEDURE — 70543 MRI SOFT TISSUE NECK W W/O CONTRAST: ICD-10-PCS | Mod: 26,,, | Performed by: RADIOLOGY

## 2021-01-28 PROCEDURE — A9585 GADOBUTROL INJECTION: HCPCS | Performed by: OTOLARYNGOLOGY

## 2021-01-28 RX ORDER — GADOBUTROL 604.72 MG/ML
10 INJECTION INTRAVENOUS
Status: COMPLETED | OUTPATIENT
Start: 2021-01-28 | End: 2021-01-28

## 2021-01-28 RX ADMIN — GADOBUTROL 10 ML: 604.72 INJECTION INTRAVENOUS at 07:01

## 2021-01-29 ENCOUNTER — PATIENT MESSAGE (OUTPATIENT)
Dept: OTOLARYNGOLOGY | Facility: CLINIC | Age: 31
End: 2021-01-29

## 2021-02-07 ENCOUNTER — PATIENT MESSAGE (OUTPATIENT)
Dept: FAMILY MEDICINE | Facility: CLINIC | Age: 31
End: 2021-02-07

## 2021-03-13 ENCOUNTER — PATIENT MESSAGE (OUTPATIENT)
Dept: OBSTETRICS AND GYNECOLOGY | Facility: CLINIC | Age: 31
End: 2021-03-13

## 2021-04-14 ENCOUNTER — LAB VISIT (OUTPATIENT)
Dept: LAB | Facility: HOSPITAL | Age: 31
End: 2021-04-14
Attending: FAMILY MEDICINE
Payer: MEDICAID

## 2021-04-14 DIAGNOSIS — T14.8XXA BRUISE: ICD-10-CM

## 2021-04-14 DIAGNOSIS — Z01.419 WELL WOMAN EXAM: ICD-10-CM

## 2021-04-14 DIAGNOSIS — E61.1 IRON DEFICIENCY: ICD-10-CM

## 2021-04-14 DIAGNOSIS — D64.9 NORMOCYTIC ANEMIA: ICD-10-CM

## 2021-04-14 DIAGNOSIS — Z11.59 NEED FOR HEPATITIS C SCREENING TEST: ICD-10-CM

## 2021-04-14 LAB
25(OH)D3+25(OH)D2 SERPL-MCNC: 28 NG/ML (ref 30–96)
ALBUMIN SERPL BCP-MCNC: 3.7 G/DL (ref 3.5–5.2)
ALP SERPL-CCNC: 87 U/L (ref 55–135)
ALT SERPL W/O P-5'-P-CCNC: 8 U/L (ref 10–44)
ANION GAP SERPL CALC-SCNC: 8 MMOL/L (ref 8–16)
AST SERPL-CCNC: 12 U/L (ref 10–40)
BASOPHILS # BLD AUTO: 0.06 K/UL (ref 0–0.2)
BASOPHILS NFR BLD: 0.7 % (ref 0–1.9)
BILIRUB SERPL-MCNC: 0.9 MG/DL (ref 0.1–1)
BUN SERPL-MCNC: 15 MG/DL (ref 6–20)
CALCIUM SERPL-MCNC: 9.3 MG/DL (ref 8.7–10.5)
CHLORIDE SERPL-SCNC: 107 MMOL/L (ref 95–110)
CHOLEST SERPL-MCNC: 142 MG/DL (ref 120–199)
CHOLEST/HDLC SERPL: 3.8 {RATIO} (ref 2–5)
CO2 SERPL-SCNC: 25 MMOL/L (ref 23–29)
CREAT SERPL-MCNC: 0.9 MG/DL (ref 0.5–1.4)
DIFFERENTIAL METHOD: NORMAL
EOSINOPHIL # BLD AUTO: 0.2 K/UL (ref 0–0.5)
EOSINOPHIL NFR BLD: 2.4 % (ref 0–8)
ERYTHROCYTE [DISTWIDTH] IN BLOOD BY AUTOMATED COUNT: 13.5 % (ref 11.5–14.5)
EST. GFR  (AFRICAN AMERICAN): >60 ML/MIN/1.73 M^2
EST. GFR  (NON AFRICAN AMERICAN): >60 ML/MIN/1.73 M^2
ESTIMATED AVG GLUCOSE: 97 MG/DL (ref 68–131)
FERRITIN SERPL-MCNC: 56 NG/ML (ref 20–300)
FOLATE SERPL-MCNC: 15.3 NG/ML (ref 4–24)
GLUCOSE SERPL-MCNC: 78 MG/DL (ref 70–110)
HBA1C MFR BLD: 5 % (ref 4–5.6)
HCT VFR BLD AUTO: 39 % (ref 37–48.5)
HDLC SERPL-MCNC: 37 MG/DL (ref 40–75)
HDLC SERPL: 26.1 % (ref 20–50)
HGB BLD-MCNC: 12.5 G/DL (ref 12–16)
IMM GRANULOCYTES # BLD AUTO: 0.02 K/UL (ref 0–0.04)
IMM GRANULOCYTES NFR BLD AUTO: 0.2 % (ref 0–0.5)
INR PPP: 1 (ref 0.8–1.2)
IRON SERPL-MCNC: 103 UG/DL (ref 30–160)
LDLC SERPL CALC-MCNC: 86.2 MG/DL (ref 63–159)
LYMPHOCYTES # BLD AUTO: 2.3 K/UL (ref 1–4.8)
LYMPHOCYTES NFR BLD: 26.2 % (ref 18–48)
MCH RBC QN AUTO: 27.4 PG (ref 27–31)
MCHC RBC AUTO-ENTMCNC: 32.1 G/DL (ref 32–36)
MCV RBC AUTO: 86 FL (ref 82–98)
MONOCYTES # BLD AUTO: 0.5 K/UL (ref 0.3–1)
MONOCYTES NFR BLD: 5.9 % (ref 4–15)
NEUTROPHILS # BLD AUTO: 5.6 K/UL (ref 1.8–7.7)
NEUTROPHILS NFR BLD: 64.6 % (ref 38–73)
NONHDLC SERPL-MCNC: 105 MG/DL
NRBC BLD-RTO: 0 /100 WBC
PLATELET # BLD AUTO: 309 K/UL (ref 150–450)
PMV BLD AUTO: 10.7 FL (ref 9.2–12.9)
POTASSIUM SERPL-SCNC: 4.1 MMOL/L (ref 3.5–5.1)
PROT SERPL-MCNC: 7.2 G/DL (ref 6–8.4)
PROTHROMBIN TIME: 11 SEC (ref 9–12.5)
RBC # BLD AUTO: 4.56 M/UL (ref 4–5.4)
SATURATED IRON: 27 % (ref 20–50)
SODIUM SERPL-SCNC: 140 MMOL/L (ref 136–145)
TOTAL IRON BINDING CAPACITY: 377 UG/DL (ref 250–450)
TRANSFERRIN SERPL-MCNC: 255 MG/DL (ref 200–375)
TRIGL SERPL-MCNC: 94 MG/DL (ref 30–150)
TSH SERPL DL<=0.005 MIU/L-ACNC: 1.5 UIU/ML (ref 0.4–4)
VIT B12 SERPL-MCNC: 607 PG/ML (ref 210–950)
WBC # BLD AUTO: 8.66 K/UL (ref 3.9–12.7)

## 2021-04-14 PROCEDURE — 86703 HIV-1/HIV-2 1 RESULT ANTBDY: CPT | Performed by: FAMILY MEDICINE

## 2021-04-14 PROCEDURE — 85610 PROTHROMBIN TIME: CPT | Performed by: FAMILY MEDICINE

## 2021-04-14 PROCEDURE — 85025 COMPLETE CBC W/AUTO DIFF WBC: CPT | Performed by: FAMILY MEDICINE

## 2021-04-14 PROCEDURE — 82728 ASSAY OF FERRITIN: CPT | Performed by: FAMILY MEDICINE

## 2021-04-14 PROCEDURE — 83540 ASSAY OF IRON: CPT | Performed by: FAMILY MEDICINE

## 2021-04-14 PROCEDURE — 82180 ASSAY OF ASCORBIC ACID: CPT | Performed by: FAMILY MEDICINE

## 2021-04-14 PROCEDURE — 83036 HEMOGLOBIN GLYCOSYLATED A1C: CPT | Performed by: FAMILY MEDICINE

## 2021-04-14 PROCEDURE — 82607 VITAMIN B-12: CPT | Performed by: FAMILY MEDICINE

## 2021-04-14 PROCEDURE — 84443 ASSAY THYROID STIM HORMONE: CPT | Performed by: FAMILY MEDICINE

## 2021-04-14 PROCEDURE — 86803 HEPATITIS C AB TEST: CPT | Performed by: FAMILY MEDICINE

## 2021-04-14 PROCEDURE — 82306 VITAMIN D 25 HYDROXY: CPT | Performed by: FAMILY MEDICINE

## 2021-04-14 PROCEDURE — 82746 ASSAY OF FOLIC ACID SERUM: CPT | Performed by: FAMILY MEDICINE

## 2021-04-14 PROCEDURE — 80053 COMPREHEN METABOLIC PANEL: CPT | Performed by: FAMILY MEDICINE

## 2021-04-14 PROCEDURE — 80061 LIPID PANEL: CPT | Performed by: FAMILY MEDICINE

## 2021-04-14 PROCEDURE — 36415 COLL VENOUS BLD VENIPUNCTURE: CPT | Mod: PO | Performed by: FAMILY MEDICINE

## 2021-04-15 LAB
HCV AB SERPL QL IA: NEGATIVE
HIV 1+2 AB+HIV1 P24 AG SERPL QL IA: NEGATIVE

## 2021-04-16 ENCOUNTER — OFFICE VISIT (OUTPATIENT)
Dept: FAMILY MEDICINE | Facility: CLINIC | Age: 31
End: 2021-04-16
Payer: MEDICAID

## 2021-04-16 VITALS
HEIGHT: 65 IN | OXYGEN SATURATION: 98 % | WEIGHT: 201.5 LBS | TEMPERATURE: 99 F | SYSTOLIC BLOOD PRESSURE: 110 MMHG | DIASTOLIC BLOOD PRESSURE: 78 MMHG | BODY MASS INDEX: 33.57 KG/M2 | HEART RATE: 88 BPM

## 2021-04-16 DIAGNOSIS — F41.0 PANIC ATTACK: ICD-10-CM

## 2021-04-16 DIAGNOSIS — F41.9 ANXIETY: ICD-10-CM

## 2021-04-16 DIAGNOSIS — M54.12 CERVICAL RADICULOPATHY: ICD-10-CM

## 2021-04-16 DIAGNOSIS — E61.1 IRON DEFICIENCY: ICD-10-CM

## 2021-04-16 DIAGNOSIS — F33.41 RECURRENT MAJOR DEPRESSIVE DISORDER, IN PARTIAL REMISSION: ICD-10-CM

## 2021-04-16 DIAGNOSIS — Z80.3 FAMILY HISTORY OF BREAST CANCER IN MOTHER: ICD-10-CM

## 2021-04-16 DIAGNOSIS — Z01.419 WELL WOMAN EXAM: Primary | ICD-10-CM

## 2021-04-16 DIAGNOSIS — E55.9 VITAMIN D DEFICIENCY: ICD-10-CM

## 2021-04-16 DIAGNOSIS — D50.8 OTHER IRON DEFICIENCY ANEMIA: ICD-10-CM

## 2021-04-16 PROBLEM — D50.9 IRON DEFICIENCY ANEMIA: Status: ACTIVE | Noted: 2021-04-16

## 2021-04-16 PROBLEM — F32.A DEPRESSION: Status: RESOLVED | Noted: 2017-10-05 | Resolved: 2021-04-16

## 2021-04-16 PROCEDURE — 99999 PR PBB SHADOW E&M-EST. PATIENT-LVL IV: ICD-10-PCS | Mod: PBBFAC,,, | Performed by: FAMILY MEDICINE

## 2021-04-16 PROCEDURE — 99395 PR PREVENTIVE VISIT,EST,18-39: ICD-10-PCS | Mod: S$PBB,,, | Performed by: FAMILY MEDICINE

## 2021-04-16 PROCEDURE — 99395 PREV VISIT EST AGE 18-39: CPT | Mod: S$PBB,,, | Performed by: FAMILY MEDICINE

## 2021-04-16 PROCEDURE — 99999 PR PBB SHADOW E&M-EST. PATIENT-LVL IV: CPT | Mod: PBBFAC,,, | Performed by: FAMILY MEDICINE

## 2021-04-16 PROCEDURE — 99214 OFFICE O/P EST MOD 30 MIN: CPT | Mod: PBBFAC,PO | Performed by: FAMILY MEDICINE

## 2021-04-16 RX ORDER — BUPROPION HYDROCHLORIDE 150 MG/1
150 TABLET ORAL DAILY
Qty: 90 TABLET | Refills: 1 | Status: SHIPPED | OUTPATIENT
Start: 2021-04-16 | End: 2021-07-16

## 2021-04-16 RX ORDER — FERROUS SULFATE 324(65)MG
325 TABLET, DELAYED RELEASE (ENTERIC COATED) ORAL 2 TIMES DAILY
Qty: 180 TABLET | Refills: 1 | Status: SHIPPED | OUTPATIENT
Start: 2021-04-16 | End: 2021-04-16 | Stop reason: SDUPTHER

## 2021-04-16 RX ORDER — FERROUS SULFATE 324(65)MG
325 TABLET, DELAYED RELEASE (ENTERIC COATED) ORAL DAILY
Qty: 90 TABLET | Refills: 1 | Status: ON HOLD | OUTPATIENT
Start: 2021-04-16 | End: 2022-01-13 | Stop reason: HOSPADM

## 2021-04-19 LAB — VIT C SERPL-MCNC: 4 MG/L (ref 2–19)

## 2021-05-08 ENCOUNTER — PATIENT MESSAGE (OUTPATIENT)
Dept: OBSTETRICS AND GYNECOLOGY | Facility: CLINIC | Age: 31
End: 2021-05-08

## 2021-05-08 DIAGNOSIS — Z32.01 POSITIVE URINE PREGNANCY TEST: Primary | ICD-10-CM

## 2021-05-10 ENCOUNTER — PATIENT MESSAGE (OUTPATIENT)
Dept: OBSTETRICS AND GYNECOLOGY | Facility: CLINIC | Age: 31
End: 2021-05-10

## 2021-05-15 ENCOUNTER — HOSPITAL ENCOUNTER (EMERGENCY)
Facility: HOSPITAL | Age: 31
Discharge: HOME | End: 2021-05-16
Attending: STUDENT IN AN ORGANIZED HEALTH CARE EDUCATION/TRAINING PROGRAM
Payer: MEDICAID

## 2021-05-15 DIAGNOSIS — Z3A.01 LESS THAN 8 WEEKS GESTATION OF PREGNANCY: ICD-10-CM

## 2021-05-15 DIAGNOSIS — R06.02 SHORTNESS OF BREATH: Primary | ICD-10-CM

## 2021-05-15 LAB
B-HCG UR QL: POSITIVE
BASOPHILS # BLD AUTO: 0.04 K/UL (ref 0–0.2)
BASOPHILS NFR BLD: 0.3 % (ref 0–1.9)
CTP QC/QA: YES
CTP QC/QA: YES
D DIMER PPP IA.FEU-MCNC: 0.53 MG/L FEU
DIFFERENTIAL METHOD: ABNORMAL
EOSINOPHIL # BLD AUTO: 0.3 K/UL (ref 0–0.5)
EOSINOPHIL NFR BLD: 2.3 % (ref 0–8)
ERYTHROCYTE [DISTWIDTH] IN BLOOD BY AUTOMATED COUNT: 13.8 % (ref 11.5–14.5)
HCT VFR BLD AUTO: 34.4 % (ref 37–48.5)
HGB BLD-MCNC: 11.5 G/DL (ref 12–16)
IMM GRANULOCYTES # BLD AUTO: 0.02 K/UL (ref 0–0.04)
IMM GRANULOCYTES NFR BLD AUTO: 0.2 % (ref 0–0.5)
LYMPHOCYTES # BLD AUTO: 3.3 K/UL (ref 1–4.8)
LYMPHOCYTES NFR BLD: 28.5 % (ref 18–48)
MCH RBC QN AUTO: 27.9 PG (ref 27–31)
MCHC RBC AUTO-ENTMCNC: 33.4 G/DL (ref 32–36)
MCV RBC AUTO: 84 FL (ref 82–98)
MONOCYTES # BLD AUTO: 0.9 K/UL (ref 0.3–1)
MONOCYTES NFR BLD: 7.5 % (ref 4–15)
NEUTROPHILS # BLD AUTO: 7.2 K/UL (ref 1.8–7.7)
NEUTROPHILS NFR BLD: 61.2 % (ref 38–73)
NRBC BLD-RTO: 0 /100 WBC
PLATELET # BLD AUTO: 264 K/UL (ref 150–450)
PMV BLD AUTO: 10 FL (ref 9.2–12.9)
RBC # BLD AUTO: 4.12 M/UL (ref 4–5.4)
SARS-COV-2 RDRP RESP QL NAA+PROBE: NEGATIVE
TROPONIN I SERPL DL<=0.01 NG/ML-MCNC: <0.006 NG/ML (ref 0–0.03)
WBC # BLD AUTO: 11.71 K/UL (ref 3.9–12.7)

## 2021-05-15 PROCEDURE — 85025 COMPLETE CBC W/AUTO DIFF WBC: CPT | Performed by: STUDENT IN AN ORGANIZED HEALTH CARE EDUCATION/TRAINING PROGRAM

## 2021-05-15 PROCEDURE — 93010 ELECTROCARDIOGRAM REPORT: CPT | Mod: ,,, | Performed by: INTERNAL MEDICINE

## 2021-05-15 PROCEDURE — 93005 ELECTROCARDIOGRAM TRACING: CPT

## 2021-05-15 PROCEDURE — 84484 ASSAY OF TROPONIN QUANT: CPT | Performed by: STUDENT IN AN ORGANIZED HEALTH CARE EDUCATION/TRAINING PROGRAM

## 2021-05-15 PROCEDURE — 99285 EMERGENCY DEPT VISIT HI MDM: CPT | Mod: 25

## 2021-05-15 PROCEDURE — 83880 ASSAY OF NATRIURETIC PEPTIDE: CPT | Performed by: STUDENT IN AN ORGANIZED HEALTH CARE EDUCATION/TRAINING PROGRAM

## 2021-05-15 PROCEDURE — U0002 COVID-19 LAB TEST NON-CDC: HCPCS | Performed by: STUDENT IN AN ORGANIZED HEALTH CARE EDUCATION/TRAINING PROGRAM

## 2021-05-15 PROCEDURE — 93010 EKG 12-LEAD: ICD-10-PCS | Mod: ,,, | Performed by: INTERNAL MEDICINE

## 2021-05-15 PROCEDURE — 85379 FIBRIN DEGRADATION QUANT: CPT | Performed by: STUDENT IN AN ORGANIZED HEALTH CARE EDUCATION/TRAINING PROGRAM

## 2021-05-15 PROCEDURE — 81025 URINE PREGNANCY TEST: CPT | Performed by: STUDENT IN AN ORGANIZED HEALTH CARE EDUCATION/TRAINING PROGRAM

## 2021-05-16 VITALS
BODY MASS INDEX: 32.49 KG/M2 | RESPIRATION RATE: 22 BRPM | WEIGHT: 195 LBS | OXYGEN SATURATION: 100 % | DIASTOLIC BLOOD PRESSURE: 59 MMHG | TEMPERATURE: 99 F | HEIGHT: 65 IN | HEART RATE: 68 BPM | SYSTOLIC BLOOD PRESSURE: 100 MMHG

## 2021-05-16 LAB
ALBUMIN SERPL BCP-MCNC: 3.5 G/DL (ref 3.5–5.2)
ALP SERPL-CCNC: 61 U/L (ref 55–135)
ALT SERPL W/O P-5'-P-CCNC: 9 U/L (ref 10–44)
ANION GAP SERPL CALC-SCNC: 9 MMOL/L (ref 8–16)
AST SERPL-CCNC: 10 U/L (ref 10–40)
BILIRUB SERPL-MCNC: 0.3 MG/DL (ref 0.1–1)
BNP SERPL-MCNC: <10 PG/ML (ref 0–99)
BUN SERPL-MCNC: 11 MG/DL (ref 6–20)
CALCIUM SERPL-MCNC: 9 MG/DL (ref 8.7–10.5)
CHLORIDE SERPL-SCNC: 108 MMOL/L (ref 95–110)
CO2 SERPL-SCNC: 20 MMOL/L (ref 23–29)
CREAT SERPL-MCNC: 0.8 MG/DL (ref 0.5–1.4)
EST. GFR  (AFRICAN AMERICAN): >60 ML/MIN/1.73 M^2
EST. GFR  (NON AFRICAN AMERICAN): >60 ML/MIN/1.73 M^2
GLUCOSE SERPL-MCNC: 79 MG/DL (ref 70–110)
POTASSIUM SERPL-SCNC: 3.6 MMOL/L (ref 3.5–5.1)
PROT SERPL-MCNC: 6.5 G/DL (ref 6–8.4)
SODIUM SERPL-SCNC: 137 MMOL/L (ref 136–145)

## 2021-05-16 PROCEDURE — 80053 COMPREHEN METABOLIC PANEL: CPT | Performed by: STUDENT IN AN ORGANIZED HEALTH CARE EDUCATION/TRAINING PROGRAM

## 2021-05-19 ENCOUNTER — CLINICAL SUPPORT (OUTPATIENT)
Dept: REHABILITATION | Facility: HOSPITAL | Age: 31
End: 2021-05-19
Attending: FAMILY MEDICINE
Payer: MEDICAID

## 2021-05-19 DIAGNOSIS — M54.12 CERVICAL RADICULOPATHY: ICD-10-CM

## 2021-05-19 DIAGNOSIS — M62.81 MUSCLE WEAKNESS OF UPPER EXTREMITY: ICD-10-CM

## 2021-05-19 DIAGNOSIS — M53.82 DECREASED ROM OF INTERVERTEBRAL DISCS OF CERVICAL SPINE: ICD-10-CM

## 2021-05-19 DIAGNOSIS — R29.3 POSTURAL IMBALANCE: ICD-10-CM

## 2021-05-19 PROCEDURE — 97161 PT EVAL LOW COMPLEX 20 MIN: CPT | Mod: PN

## 2021-05-19 PROCEDURE — 97110 THERAPEUTIC EXERCISES: CPT | Mod: PN

## 2021-05-21 ENCOUNTER — PATIENT MESSAGE (OUTPATIENT)
Dept: OBSTETRICS AND GYNECOLOGY | Facility: CLINIC | Age: 31
End: 2021-05-21

## 2021-06-03 ENCOUNTER — CLINICAL SUPPORT (OUTPATIENT)
Dept: REHABILITATION | Facility: HOSPITAL | Age: 31
End: 2021-06-03
Attending: FAMILY MEDICINE
Payer: MEDICAID

## 2021-06-03 DIAGNOSIS — M53.82 DECREASED ROM OF INTERVERTEBRAL DISCS OF CERVICAL SPINE: ICD-10-CM

## 2021-06-03 DIAGNOSIS — R29.3 POSTURAL IMBALANCE: ICD-10-CM

## 2021-06-03 DIAGNOSIS — M62.81 MUSCLE WEAKNESS OF UPPER EXTREMITY: ICD-10-CM

## 2021-06-03 PROCEDURE — 97110 THERAPEUTIC EXERCISES: CPT | Mod: PN,CQ

## 2021-06-03 PROCEDURE — 97140 MANUAL THERAPY 1/> REGIONS: CPT | Mod: PN,CQ

## 2021-06-07 ENCOUNTER — CLINICAL SUPPORT (OUTPATIENT)
Dept: REHABILITATION | Facility: HOSPITAL | Age: 31
End: 2021-06-07
Attending: FAMILY MEDICINE
Payer: MEDICAID

## 2021-06-07 DIAGNOSIS — M62.81 MUSCLE WEAKNESS OF UPPER EXTREMITY: ICD-10-CM

## 2021-06-07 DIAGNOSIS — M53.82 DECREASED ROM OF INTERVERTEBRAL DISCS OF CERVICAL SPINE: ICD-10-CM

## 2021-06-07 DIAGNOSIS — R29.3 POSTURAL IMBALANCE: ICD-10-CM

## 2021-06-07 PROCEDURE — 97110 THERAPEUTIC EXERCISES: CPT | Mod: PN,CQ

## 2021-06-08 ENCOUNTER — PROCEDURE VISIT (OUTPATIENT)
Dept: OBSTETRICS AND GYNECOLOGY | Facility: CLINIC | Age: 31
End: 2021-06-08
Payer: MEDICAID

## 2021-06-08 ENCOUNTER — OFFICE VISIT (OUTPATIENT)
Dept: OBSTETRICS AND GYNECOLOGY | Facility: CLINIC | Age: 31
End: 2021-06-08
Payer: MEDICAID

## 2021-06-08 VITALS
SYSTOLIC BLOOD PRESSURE: 106 MMHG | WEIGHT: 191 LBS | BODY MASS INDEX: 31.78 KG/M2 | HEART RATE: 95 BPM | RESPIRATION RATE: 16 BRPM | OXYGEN SATURATION: 99 % | DIASTOLIC BLOOD PRESSURE: 60 MMHG

## 2021-06-08 DIAGNOSIS — Z32.01 POSITIVE URINE PREGNANCY TEST: ICD-10-CM

## 2021-06-08 DIAGNOSIS — Z12.4 SCREENING FOR CERVICAL CANCER: ICD-10-CM

## 2021-06-08 DIAGNOSIS — Z32.01 POSITIVE URINE PREGNANCY TEST: Primary | ICD-10-CM

## 2021-06-08 DIAGNOSIS — J45.20 MILD INTERMITTENT ASTHMA WITHOUT COMPLICATION: ICD-10-CM

## 2021-06-08 DIAGNOSIS — Z36.89 ESTABLISH GESTATIONAL AGE, ULTRASOUND: Primary | ICD-10-CM

## 2021-06-08 LAB
B-HCG UR QL: POSITIVE
CREAT UR-MCNC: 295 MG/DL (ref 15–325)
CTP QC/QA: YES
PROT UR-MCNC: 12 MG/DL (ref 0–15)
PROT/CREAT UR: 0.04 MG/G{CREAT} (ref 0–0.2)

## 2021-06-08 PROCEDURE — 87491 CHLMYD TRACH DNA AMP PROBE: CPT | Performed by: OBSTETRICS & GYNECOLOGY

## 2021-06-08 PROCEDURE — 87086 URINE CULTURE/COLONY COUNT: CPT | Performed by: OBSTETRICS & GYNECOLOGY

## 2021-06-08 PROCEDURE — 76801 PR US, OB <14WKS, TRANSABD, SINGLE GESTATION: ICD-10-PCS | Mod: 26,S$PBB,, | Performed by: OBSTETRICS & GYNECOLOGY

## 2021-06-08 PROCEDURE — 99213 OFFICE O/P EST LOW 20 MIN: CPT | Mod: PBBFAC,TH,PO | Performed by: OBSTETRICS & GYNECOLOGY

## 2021-06-08 PROCEDURE — 81025 URINE PREGNANCY TEST: CPT | Mod: PBBFAC,PO | Performed by: OBSTETRICS & GYNECOLOGY

## 2021-06-08 PROCEDURE — 82570 ASSAY OF URINE CREATININE: CPT | Performed by: OBSTETRICS & GYNECOLOGY

## 2021-06-08 PROCEDURE — 88175 CYTOPATH C/V AUTO FLUID REDO: CPT | Performed by: OBSTETRICS & GYNECOLOGY

## 2021-06-08 PROCEDURE — 99204 PR OFFICE/OUTPT VISIT, NEW, LEVL IV, 45-59 MIN: ICD-10-PCS | Mod: TH,S$PBB,, | Performed by: OBSTETRICS & GYNECOLOGY

## 2021-06-08 PROCEDURE — 99999 PR PBB SHADOW E&M-EST. PATIENT-LVL III: ICD-10-PCS | Mod: PBBFAC,,, | Performed by: OBSTETRICS & GYNECOLOGY

## 2021-06-08 PROCEDURE — 76801 OB US < 14 WKS SINGLE FETUS: CPT | Mod: 26,S$PBB,, | Performed by: OBSTETRICS & GYNECOLOGY

## 2021-06-08 PROCEDURE — 76801 OB US < 14 WKS SINGLE FETUS: CPT | Mod: PBBFAC,PO | Performed by: OBSTETRICS & GYNECOLOGY

## 2021-06-08 PROCEDURE — 99204 OFFICE O/P NEW MOD 45 MIN: CPT | Mod: TH,S$PBB,, | Performed by: OBSTETRICS & GYNECOLOGY

## 2021-06-08 PROCEDURE — 99999 PR PBB SHADOW E&M-EST. PATIENT-LVL III: CPT | Mod: PBBFAC,,, | Performed by: OBSTETRICS & GYNECOLOGY

## 2021-06-08 PROCEDURE — 87591 N.GONORRHOEAE DNA AMP PROB: CPT | Performed by: OBSTETRICS & GYNECOLOGY

## 2021-06-08 RX ORDER — DOXYLAMINE SUCCINATE AND PYRIDOXINE HYDROCHLORIDE, DELAYED RELEASE TABLETS 10 MG/10 MG 10; 10 MG/1; MG/1
TABLET, DELAYED RELEASE ORAL
Qty: 30 TABLET | Refills: 2 | Status: SHIPPED | OUTPATIENT
Start: 2021-06-08 | End: 2021-07-16

## 2021-06-08 RX ORDER — ALBUTEROL SULFATE 90 UG/1
2 AEROSOL, METERED RESPIRATORY (INHALATION) EVERY 6 HOURS PRN
Qty: 18 G | Refills: 0 | Status: SHIPPED | OUTPATIENT
Start: 2021-06-08 | End: 2024-01-30

## 2021-06-09 LAB — BACTERIA UR CULT: NO GROWTH

## 2021-06-10 ENCOUNTER — PATIENT MESSAGE (OUTPATIENT)
Dept: OBSTETRICS AND GYNECOLOGY | Facility: CLINIC | Age: 31
End: 2021-06-10

## 2021-06-11 ENCOUNTER — PATIENT MESSAGE (OUTPATIENT)
Dept: OBSTETRICS AND GYNECOLOGY | Facility: CLINIC | Age: 31
End: 2021-06-11

## 2021-06-11 LAB
C TRACH DNA SPEC QL NAA+PROBE: NOT DETECTED
N GONORRHOEA DNA SPEC QL NAA+PROBE: NOT DETECTED

## 2021-06-14 RX ORDER — ONDANSETRON 8 MG/1
8 TABLET, ORALLY DISINTEGRATING ORAL EVERY 8 HOURS PRN
Qty: 30 TABLET | Refills: 2 | Status: SHIPPED | OUTPATIENT
Start: 2021-06-14 | End: 2021-07-16

## 2021-06-15 ENCOUNTER — CLINICAL SUPPORT (OUTPATIENT)
Dept: REHABILITATION | Facility: HOSPITAL | Age: 31
End: 2021-06-15
Attending: FAMILY MEDICINE
Payer: MEDICAID

## 2021-06-15 ENCOUNTER — TELEPHONE (OUTPATIENT)
Dept: OBSTETRICS AND GYNECOLOGY | Facility: CLINIC | Age: 31
End: 2021-06-15

## 2021-06-15 DIAGNOSIS — M62.81 MUSCLE WEAKNESS OF UPPER EXTREMITY: ICD-10-CM

## 2021-06-15 DIAGNOSIS — R29.3 POSTURAL IMBALANCE: ICD-10-CM

## 2021-06-15 DIAGNOSIS — M53.82 DECREASED ROM OF INTERVERTEBRAL DISCS OF CERVICAL SPINE: Primary | ICD-10-CM

## 2021-06-15 PROCEDURE — 97110 THERAPEUTIC EXERCISES: CPT | Mod: PN

## 2021-06-16 LAB
FINAL PATHOLOGIC DIAGNOSIS: NORMAL
Lab: NORMAL

## 2021-06-18 ENCOUNTER — CLINICAL SUPPORT (OUTPATIENT)
Dept: REHABILITATION | Facility: HOSPITAL | Age: 31
End: 2021-06-18
Attending: FAMILY MEDICINE
Payer: MEDICAID

## 2021-06-18 DIAGNOSIS — R29.3 POSTURAL IMBALANCE: ICD-10-CM

## 2021-06-18 DIAGNOSIS — M53.82 DECREASED ROM OF INTERVERTEBRAL DISCS OF CERVICAL SPINE: Primary | ICD-10-CM

## 2021-06-18 DIAGNOSIS — M62.81 MUSCLE WEAKNESS OF UPPER EXTREMITY: ICD-10-CM

## 2021-06-18 PROCEDURE — 97110 THERAPEUTIC EXERCISES: CPT | Mod: PN,CQ

## 2021-06-22 ENCOUNTER — CLINICAL SUPPORT (OUTPATIENT)
Dept: REHABILITATION | Facility: HOSPITAL | Age: 31
End: 2021-06-22
Attending: FAMILY MEDICINE
Payer: MEDICAID

## 2021-06-22 DIAGNOSIS — R29.3 POSTURAL IMBALANCE: ICD-10-CM

## 2021-06-22 DIAGNOSIS — M62.81 MUSCLE WEAKNESS OF UPPER EXTREMITY: ICD-10-CM

## 2021-06-22 DIAGNOSIS — M53.82 DECREASED ROM OF INTERVERTEBRAL DISCS OF CERVICAL SPINE: Primary | ICD-10-CM

## 2021-06-22 PROCEDURE — 97110 THERAPEUTIC EXERCISES: CPT | Mod: PN

## 2021-06-24 ENCOUNTER — CLINICAL SUPPORT (OUTPATIENT)
Dept: REHABILITATION | Facility: HOSPITAL | Age: 31
End: 2021-06-24
Attending: FAMILY MEDICINE
Payer: MEDICAID

## 2021-06-24 DIAGNOSIS — M62.81 MUSCLE WEAKNESS OF UPPER EXTREMITY: ICD-10-CM

## 2021-06-24 DIAGNOSIS — R29.3 POSTURAL IMBALANCE: ICD-10-CM

## 2021-06-24 DIAGNOSIS — M53.82 DECREASED ROM OF INTERVERTEBRAL DISCS OF CERVICAL SPINE: Primary | ICD-10-CM

## 2021-06-24 PROCEDURE — 97110 THERAPEUTIC EXERCISES: CPT | Mod: PN

## 2021-06-29 ENCOUNTER — CLINICAL SUPPORT (OUTPATIENT)
Dept: REHABILITATION | Facility: HOSPITAL | Age: 31
End: 2021-06-29
Attending: FAMILY MEDICINE
Payer: MEDICAID

## 2021-06-29 DIAGNOSIS — M53.82 DECREASED ROM OF INTERVERTEBRAL DISCS OF CERVICAL SPINE: Primary | ICD-10-CM

## 2021-06-29 DIAGNOSIS — M62.81 MUSCLE WEAKNESS OF UPPER EXTREMITY: ICD-10-CM

## 2021-06-29 DIAGNOSIS — R29.3 POSTURAL IMBALANCE: ICD-10-CM

## 2021-06-29 PROCEDURE — 97110 THERAPEUTIC EXERCISES: CPT | Mod: PN,CQ

## 2021-07-01 ENCOUNTER — CLINICAL SUPPORT (OUTPATIENT)
Dept: REHABILITATION | Facility: HOSPITAL | Age: 31
End: 2021-07-01
Attending: FAMILY MEDICINE
Payer: MEDICAID

## 2021-07-01 DIAGNOSIS — R29.3 POSTURAL IMBALANCE: ICD-10-CM

## 2021-07-01 DIAGNOSIS — M53.82 DECREASED ROM OF INTERVERTEBRAL DISCS OF CERVICAL SPINE: ICD-10-CM

## 2021-07-01 DIAGNOSIS — M62.81 MUSCLE WEAKNESS OF UPPER EXTREMITY: ICD-10-CM

## 2021-07-01 PROCEDURE — 97110 THERAPEUTIC EXERCISES: CPT | Mod: PN,CQ

## 2021-07-07 ENCOUNTER — ROUTINE PRENATAL (OUTPATIENT)
Dept: OBSTETRICS AND GYNECOLOGY | Facility: CLINIC | Age: 31
End: 2021-07-07
Payer: MEDICAID

## 2021-07-07 ENCOUNTER — LAB VISIT (OUTPATIENT)
Dept: LAB | Facility: HOSPITAL | Age: 31
End: 2021-07-07
Attending: OBSTETRICS & GYNECOLOGY
Payer: MEDICAID

## 2021-07-07 ENCOUNTER — CLINICAL SUPPORT (OUTPATIENT)
Dept: REHABILITATION | Facility: HOSPITAL | Age: 31
End: 2021-07-07
Attending: FAMILY MEDICINE
Payer: MEDICAID

## 2021-07-07 VITALS — SYSTOLIC BLOOD PRESSURE: 104 MMHG | WEIGHT: 192.88 LBS | DIASTOLIC BLOOD PRESSURE: 66 MMHG | BODY MASS INDEX: 32.1 KG/M2

## 2021-07-07 DIAGNOSIS — M53.82 DECREASED ROM OF INTERVERTEBRAL DISCS OF CERVICAL SPINE: Primary | ICD-10-CM

## 2021-07-07 DIAGNOSIS — Z34.80 SUPERVISION OF OTHER NORMAL PREGNANCY: ICD-10-CM

## 2021-07-07 DIAGNOSIS — M62.81 MUSCLE WEAKNESS OF UPPER EXTREMITY: ICD-10-CM

## 2021-07-07 DIAGNOSIS — Z32.01 POSITIVE URINE PREGNANCY TEST: ICD-10-CM

## 2021-07-07 DIAGNOSIS — Z34.80 SUPERVISION OF OTHER NORMAL PREGNANCY: Primary | ICD-10-CM

## 2021-07-07 DIAGNOSIS — R29.3 POSTURAL IMBALANCE: ICD-10-CM

## 2021-07-07 LAB
ABO + RH BLD: NORMAL
ALBUMIN SERPL BCP-MCNC: 3.3 G/DL (ref 3.5–5.2)
ALP SERPL-CCNC: 58 U/L (ref 55–135)
ALT SERPL W/O P-5'-P-CCNC: 10 U/L (ref 10–44)
ANION GAP SERPL CALC-SCNC: 10 MMOL/L (ref 8–16)
AST SERPL-CCNC: 9 U/L (ref 10–40)
BASOPHILS # BLD AUTO: 0.04 K/UL (ref 0–0.2)
BASOPHILS NFR BLD: 0.4 % (ref 0–1.9)
BILIRUB SERPL-MCNC: 0.3 MG/DL (ref 0.1–1)
BLD GP AB SCN CELLS X3 SERPL QL: NORMAL
BUN SERPL-MCNC: 12 MG/DL (ref 6–20)
CALCIUM SERPL-MCNC: 8.6 MG/DL (ref 8.7–10.5)
CHLORIDE SERPL-SCNC: 107 MMOL/L (ref 95–110)
CO2 SERPL-SCNC: 18 MMOL/L (ref 23–29)
CREAT SERPL-MCNC: 0.7 MG/DL (ref 0.5–1.4)
DIFFERENTIAL METHOD: ABNORMAL
EOSINOPHIL # BLD AUTO: 0.2 K/UL (ref 0–0.5)
EOSINOPHIL NFR BLD: 1.5 % (ref 0–8)
ERYTHROCYTE [DISTWIDTH] IN BLOOD BY AUTOMATED COUNT: 13.4 % (ref 11.5–14.5)
EST. GFR  (AFRICAN AMERICAN): >60 ML/MIN/1.73 M^2
EST. GFR  (NON AFRICAN AMERICAN): >60 ML/MIN/1.73 M^2
GLUCOSE SERPL-MCNC: 95 MG/DL (ref 70–110)
HCT VFR BLD AUTO: 34.5 % (ref 37–48.5)
HGB BLD-MCNC: 11.5 G/DL (ref 12–16)
IMM GRANULOCYTES # BLD AUTO: 0.04 K/UL (ref 0–0.04)
IMM GRANULOCYTES NFR BLD AUTO: 0.4 % (ref 0–0.5)
LYMPHOCYTES # BLD AUTO: 2.3 K/UL (ref 1–4.8)
LYMPHOCYTES NFR BLD: 21.4 % (ref 18–48)
MCH RBC QN AUTO: 28.1 PG (ref 27–31)
MCHC RBC AUTO-ENTMCNC: 33.3 G/DL (ref 32–36)
MCV RBC AUTO: 84 FL (ref 82–98)
MONOCYTES # BLD AUTO: 0.5 K/UL (ref 0.3–1)
MONOCYTES NFR BLD: 5 % (ref 4–15)
NEUTROPHILS # BLD AUTO: 7.6 K/UL (ref 1.8–7.7)
NEUTROPHILS NFR BLD: 71.3 % (ref 38–73)
NRBC BLD-RTO: 0 /100 WBC
PLATELET # BLD AUTO: 289 K/UL (ref 150–450)
PMV BLD AUTO: 9.9 FL (ref 9.2–12.9)
POTASSIUM SERPL-SCNC: 3.8 MMOL/L (ref 3.5–5.1)
PROT SERPL-MCNC: 6.4 G/DL (ref 6–8.4)
RBC # BLD AUTO: 4.09 M/UL (ref 4–5.4)
SODIUM SERPL-SCNC: 135 MMOL/L (ref 136–145)
WBC # BLD AUTO: 10.63 K/UL (ref 3.9–12.7)

## 2021-07-07 PROCEDURE — 83021 HEMOGLOBIN CHROMOTOGRAPHY: CPT | Performed by: OBSTETRICS & GYNECOLOGY

## 2021-07-07 PROCEDURE — 87389 HIV-1 AG W/HIV-1&-2 AB AG IA: CPT | Performed by: OBSTETRICS & GYNECOLOGY

## 2021-07-07 PROCEDURE — 86762 RUBELLA ANTIBODY: CPT | Performed by: OBSTETRICS & GYNECOLOGY

## 2021-07-07 PROCEDURE — 97110 THERAPEUTIC EXERCISES: CPT | Mod: PN,CQ

## 2021-07-07 PROCEDURE — 87340 HEPATITIS B SURFACE AG IA: CPT | Performed by: OBSTETRICS & GYNECOLOGY

## 2021-07-07 PROCEDURE — 36415 COLL VENOUS BLD VENIPUNCTURE: CPT | Performed by: OBSTETRICS & GYNECOLOGY

## 2021-07-07 PROCEDURE — 85025 COMPLETE CBC W/AUTO DIFF WBC: CPT | Performed by: OBSTETRICS & GYNECOLOGY

## 2021-07-07 PROCEDURE — 99213 OFFICE O/P EST LOW 20 MIN: CPT | Mod: PBBFAC,TH,PO | Performed by: OBSTETRICS & GYNECOLOGY

## 2021-07-07 PROCEDURE — 99999 PR PBB SHADOW E&M-EST. PATIENT-LVL III: CPT | Mod: PBBFAC,,, | Performed by: OBSTETRICS & GYNECOLOGY

## 2021-07-07 PROCEDURE — 99213 OFFICE O/P EST LOW 20 MIN: CPT | Mod: TH,S$PBB,, | Performed by: OBSTETRICS & GYNECOLOGY

## 2021-07-07 PROCEDURE — 81220 CFTR GENE COM VARIANTS: CPT | Performed by: OBSTETRICS & GYNECOLOGY

## 2021-07-07 PROCEDURE — 99999 PR PBB SHADOW E&M-EST. PATIENT-LVL III: ICD-10-PCS | Mod: PBBFAC,,, | Performed by: OBSTETRICS & GYNECOLOGY

## 2021-07-07 PROCEDURE — 86592 SYPHILIS TEST NON-TREP QUAL: CPT | Performed by: OBSTETRICS & GYNECOLOGY

## 2021-07-07 PROCEDURE — 86900 BLOOD TYPING SEROLOGIC ABO: CPT | Performed by: OBSTETRICS & GYNECOLOGY

## 2021-07-07 PROCEDURE — 80053 COMPREHEN METABOLIC PANEL: CPT | Performed by: OBSTETRICS & GYNECOLOGY

## 2021-07-07 PROCEDURE — 84163 PAPPA SERUM: CPT | Performed by: OBSTETRICS & GYNECOLOGY

## 2021-07-07 PROCEDURE — 99213 PR OFFICE/OUTPT VISIT, EST, LEVL III, 20-29 MIN: ICD-10-PCS | Mod: TH,S$PBB,, | Performed by: OBSTETRICS & GYNECOLOGY

## 2021-07-08 LAB
HBV SURFACE AG SERPL QL IA: NEGATIVE
HGB A2 MFR BLD HPLC: 2.5 % (ref 2.2–3.2)
HGB FRACT BLD ELPH-IMP: NORMAL
HGB FRACT BLD ELPH-IMP: NORMAL
HIV 1+2 AB+HIV1 P24 AG SERPL QL IA: NEGATIVE
RPR SER QL: NORMAL
RUBV IGG SER-ACNC: 5.7 IU/ML
RUBV IGG SER-IMP: ABNORMAL

## 2021-07-09 ENCOUNTER — CLINICAL SUPPORT (OUTPATIENT)
Dept: REHABILITATION | Facility: HOSPITAL | Age: 31
End: 2021-07-09
Attending: FAMILY MEDICINE
Payer: MEDICAID

## 2021-07-09 DIAGNOSIS — M62.81 MUSCLE WEAKNESS OF UPPER EXTREMITY: ICD-10-CM

## 2021-07-09 DIAGNOSIS — R29.3 POSTURAL IMBALANCE: ICD-10-CM

## 2021-07-09 DIAGNOSIS — M53.82 DECREASED ROM OF INTERVERTEBRAL DISCS OF CERVICAL SPINE: Primary | ICD-10-CM

## 2021-07-09 PROCEDURE — 97110 THERAPEUTIC EXERCISES: CPT | Mod: PN,CQ

## 2021-07-12 LAB
CFTR MUT ANL BLD/T: NORMAL
INTEGRATED SCN PATIENT-IMP NAR: NORMAL

## 2021-07-14 ENCOUNTER — CLINICAL SUPPORT (OUTPATIENT)
Dept: REHABILITATION | Facility: HOSPITAL | Age: 31
End: 2021-07-14
Attending: FAMILY MEDICINE
Payer: MEDICAID

## 2021-07-14 DIAGNOSIS — M62.81 MUSCLE WEAKNESS OF UPPER EXTREMITY: ICD-10-CM

## 2021-07-14 DIAGNOSIS — R29.3 POSTURAL IMBALANCE: ICD-10-CM

## 2021-07-14 DIAGNOSIS — M53.82 DECREASED ROM OF INTERVERTEBRAL DISCS OF CERVICAL SPINE: Primary | ICD-10-CM

## 2021-07-14 PROCEDURE — 97110 THERAPEUTIC EXERCISES: CPT | Mod: PN

## 2021-07-15 ENCOUNTER — PATIENT MESSAGE (OUTPATIENT)
Dept: ADMINISTRATIVE | Facility: OTHER | Age: 31
End: 2021-07-15

## 2021-07-16 ENCOUNTER — OFFICE VISIT (OUTPATIENT)
Dept: FAMILY MEDICINE | Facility: CLINIC | Age: 31
End: 2021-07-16
Payer: MEDICAID

## 2021-07-16 ENCOUNTER — SSC ENCOUNTER (OUTPATIENT)
Dept: ADMINISTRATIVE | Facility: OTHER | Age: 31
End: 2021-07-16

## 2021-07-16 VITALS
DIASTOLIC BLOOD PRESSURE: 64 MMHG | BODY MASS INDEX: 32.4 KG/M2 | OXYGEN SATURATION: 99 % | HEART RATE: 84 BPM | WEIGHT: 194.44 LBS | SYSTOLIC BLOOD PRESSURE: 100 MMHG | HEIGHT: 65 IN

## 2021-07-16 DIAGNOSIS — Z63.4 BEREAVEMENT: Primary | ICD-10-CM

## 2021-07-16 DIAGNOSIS — F41.0 PANIC ATTACK: ICD-10-CM

## 2021-07-16 DIAGNOSIS — F41.9 ANXIETY: ICD-10-CM

## 2021-07-16 DIAGNOSIS — F33.41 RECURRENT MAJOR DEPRESSIVE DISORDER, IN PARTIAL REMISSION: ICD-10-CM

## 2021-07-16 PROCEDURE — 99999 PR PBB SHADOW E&M-EST. PATIENT-LVL IV: CPT | Mod: PBBFAC,,, | Performed by: FAMILY MEDICINE

## 2021-07-16 PROCEDURE — 99214 PR OFFICE/OUTPT VISIT, EST, LEVL IV, 30-39 MIN: ICD-10-PCS | Mod: S$PBB,,, | Performed by: FAMILY MEDICINE

## 2021-07-16 PROCEDURE — 99214 OFFICE O/P EST MOD 30 MIN: CPT | Mod: S$PBB,,, | Performed by: FAMILY MEDICINE

## 2021-07-16 PROCEDURE — 99999 PR PBB SHADOW E&M-EST. PATIENT-LVL IV: ICD-10-PCS | Mod: PBBFAC,,, | Performed by: FAMILY MEDICINE

## 2021-07-16 PROCEDURE — 99214 OFFICE O/P EST MOD 30 MIN: CPT | Mod: PBBFAC,PO | Performed by: FAMILY MEDICINE

## 2021-07-16 RX ORDER — BUPROPION HYDROCHLORIDE 150 MG/1
300 TABLET ORAL DAILY
Qty: 90 TABLET | Refills: 1
Start: 2021-07-16 | End: 2021-08-06 | Stop reason: SDUPTHER

## 2021-07-16 SDOH — SOCIAL DETERMINANTS OF HEALTH (SDOH): DISSAPEARANCE AND DEATH OF FAMILY MEMBER: Z63.4

## 2021-07-24 ENCOUNTER — PATIENT MESSAGE (OUTPATIENT)
Dept: OBSTETRICS AND GYNECOLOGY | Facility: CLINIC | Age: 31
End: 2021-07-24

## 2021-07-28 ENCOUNTER — PATIENT MESSAGE (OUTPATIENT)
Dept: OBSTETRICS AND GYNECOLOGY | Facility: CLINIC | Age: 31
End: 2021-07-28

## 2021-08-04 ENCOUNTER — ROUTINE PRENATAL (OUTPATIENT)
Dept: OBSTETRICS AND GYNECOLOGY | Facility: CLINIC | Age: 31
End: 2021-08-04
Payer: MEDICAID

## 2021-08-04 ENCOUNTER — LAB VISIT (OUTPATIENT)
Dept: LAB | Facility: HOSPITAL | Age: 31
End: 2021-08-04
Attending: OBSTETRICS & GYNECOLOGY
Payer: MEDICAID

## 2021-08-04 VITALS
SYSTOLIC BLOOD PRESSURE: 112 MMHG | WEIGHT: 191.25 LBS | DIASTOLIC BLOOD PRESSURE: 66 MMHG | BODY MASS INDEX: 31.83 KG/M2

## 2021-08-04 DIAGNOSIS — Z34.80 SUPERVISION OF OTHER NORMAL PREGNANCY: Primary | ICD-10-CM

## 2021-08-04 DIAGNOSIS — Z34.80 SUPERVISION OF OTHER NORMAL PREGNANCY: ICD-10-CM

## 2021-08-04 PROCEDURE — 99999 PR PBB SHADOW E&M-EST. PATIENT-LVL II: CPT | Mod: PBBFAC,,, | Performed by: OBSTETRICS & GYNECOLOGY

## 2021-08-04 PROCEDURE — 99213 OFFICE O/P EST LOW 20 MIN: CPT | Mod: TH,S$PBB,, | Performed by: OBSTETRICS & GYNECOLOGY

## 2021-08-04 PROCEDURE — 81511 FTL CGEN ABNOR FOUR ANAL: CPT | Performed by: OBSTETRICS & GYNECOLOGY

## 2021-08-04 PROCEDURE — 36415 COLL VENOUS BLD VENIPUNCTURE: CPT | Performed by: OBSTETRICS & GYNECOLOGY

## 2021-08-04 PROCEDURE — 99213 PR OFFICE/OUTPT VISIT, EST, LEVL III, 20-29 MIN: ICD-10-PCS | Mod: TH,S$PBB,, | Performed by: OBSTETRICS & GYNECOLOGY

## 2021-08-04 PROCEDURE — 99999 PR PBB SHADOW E&M-EST. PATIENT-LVL II: ICD-10-PCS | Mod: PBBFAC,,, | Performed by: OBSTETRICS & GYNECOLOGY

## 2021-08-04 PROCEDURE — 99212 OFFICE O/P EST SF 10 MIN: CPT | Mod: PBBFAC,TH,PO | Performed by: OBSTETRICS & GYNECOLOGY

## 2021-08-05 ENCOUNTER — PATIENT MESSAGE (OUTPATIENT)
Dept: FAMILY MEDICINE | Facility: CLINIC | Age: 31
End: 2021-08-05

## 2021-08-05 DIAGNOSIS — F41.0 PANIC ATTACK: ICD-10-CM

## 2021-08-05 DIAGNOSIS — F33.41 RECURRENT MAJOR DEPRESSIVE DISORDER, IN PARTIAL REMISSION: ICD-10-CM

## 2021-08-05 DIAGNOSIS — F41.9 ANXIETY: ICD-10-CM

## 2021-08-06 RX ORDER — BUPROPION HYDROCHLORIDE 300 MG/1
300 TABLET ORAL DAILY
Qty: 90 TABLET | Refills: 1 | Status: SHIPPED | OUTPATIENT
Start: 2021-08-06 | End: 2021-09-17 | Stop reason: SDUPTHER

## 2021-08-09 LAB
# FETUSES US: NORMAL
AFP MOM SERPL: 0.86
AFP SERPL-MCNC: 35.2 NG/ML
AGE AT DELIVERY: 30
B-HCG MOM SERPL: 0.6
B-HCG SERPL-ACNC: 16.2 IU/ML
COLLECT DATE BLD: NORMAL
COLLECT DATE: NORMAL
FET TS 21 RISK FROM MAT AGE: NORMAL
GA (DAYS): 2 D
GA (WEEKS): 13 WK
GA METHOD: NORMAL
GEST. AGE (DAYS) 2ND SAMPLE (SI2): 2
GEST. AGE (WKS) 2ND SAMPLE (SI2): 17
IDDM PATIENT QL: NORMAL
INHIBIN A MOM SERPL: 1.01
INHIBIN A SERPL-MCNC: 128.2 PG/ML
INTEGRATED SCN PATIENT-IMP NAR: NORMAL
INTEGRATED SCN PATIENT-IMP: NEGATIVE
PAPP-A MOM SERPL: 0.82
PAPP-A SERPL-MCNC: NORMAL NG/ML
SMOKING STATUS FTND: NO
TS 18 RISK FETUS: NORMAL
TS 21 RISK FETUS: NORMAL
U ESTRIOL MOM SERPL: 0.93
U ESTRIOL SERPL-MCNC: 1.09 NG/ML

## 2021-08-18 ENCOUNTER — PATIENT MESSAGE (OUTPATIENT)
Dept: OBSTETRICS AND GYNECOLOGY | Facility: CLINIC | Age: 31
End: 2021-08-18

## 2021-08-25 ENCOUNTER — PATIENT MESSAGE (OUTPATIENT)
Dept: OBSTETRICS AND GYNECOLOGY | Facility: CLINIC | Age: 31
End: 2021-08-25

## 2021-08-25 ENCOUNTER — ROUTINE PRENATAL (OUTPATIENT)
Dept: OBSTETRICS AND GYNECOLOGY | Facility: CLINIC | Age: 31
End: 2021-08-25
Payer: MEDICAID

## 2021-08-25 ENCOUNTER — PROCEDURE VISIT (OUTPATIENT)
Dept: OBSTETRICS AND GYNECOLOGY | Facility: CLINIC | Age: 31
End: 2021-08-25
Payer: MEDICAID

## 2021-08-25 VITALS — SYSTOLIC BLOOD PRESSURE: 98 MMHG | DIASTOLIC BLOOD PRESSURE: 64 MMHG | BODY MASS INDEX: 32.13 KG/M2 | WEIGHT: 193.13 LBS

## 2021-08-25 DIAGNOSIS — O35.EXX0 ENCOUNTER FOR REPEAT ULTRASOUND OF FETAL PYELECTASIS IN SINGLETON PREGNANCY, ANTEPARTUM: ICD-10-CM

## 2021-08-25 DIAGNOSIS — J45.20 MILD INTERMITTENT ASTHMA WITHOUT COMPLICATION: ICD-10-CM

## 2021-08-25 DIAGNOSIS — Z36.89 ENCOUNTER FOR FETAL ANATOMIC SURVEY: ICD-10-CM

## 2021-08-25 DIAGNOSIS — Z34.02 ENCOUNTER FOR SUPERVISION OF NORMAL FIRST PREGNANCY IN SECOND TRIMESTER: Primary | ICD-10-CM

## 2021-08-25 DIAGNOSIS — Z32.01 POSITIVE URINE PREGNANCY TEST: ICD-10-CM

## 2021-08-25 PROCEDURE — 99213 OFFICE O/P EST LOW 20 MIN: CPT | Mod: PBBFAC,TH,PO,25 | Performed by: OBSTETRICS & GYNECOLOGY

## 2021-08-25 PROCEDURE — 99999 PR PBB SHADOW E&M-EST. PATIENT-LVL III: ICD-10-PCS | Mod: PBBFAC,,, | Performed by: OBSTETRICS & GYNECOLOGY

## 2021-08-25 PROCEDURE — 99213 PR OFFICE/OUTPT VISIT, EST, LEVL III, 20-29 MIN: ICD-10-PCS | Mod: TH,S$PBB,, | Performed by: OBSTETRICS & GYNECOLOGY

## 2021-08-25 PROCEDURE — 76805 OB US >/= 14 WKS SNGL FETUS: CPT | Mod: 26,S$PBB,, | Performed by: OBSTETRICS & GYNECOLOGY

## 2021-08-25 PROCEDURE — 99213 OFFICE O/P EST LOW 20 MIN: CPT | Mod: TH,S$PBB,, | Performed by: OBSTETRICS & GYNECOLOGY

## 2021-08-25 PROCEDURE — 76805 PR US, OB 14+WKS, TRANSABD, SINGLE GESTATION: ICD-10-PCS | Mod: 26,S$PBB,, | Performed by: OBSTETRICS & GYNECOLOGY

## 2021-08-25 PROCEDURE — 76805 OB US >/= 14 WKS SNGL FETUS: CPT | Mod: PBBFAC,PO | Performed by: OBSTETRICS & GYNECOLOGY

## 2021-08-25 PROCEDURE — 99999 PR PBB SHADOW E&M-EST. PATIENT-LVL III: CPT | Mod: PBBFAC,,, | Performed by: OBSTETRICS & GYNECOLOGY

## 2021-08-27 ENCOUNTER — TELEPHONE (OUTPATIENT)
Dept: MATERNAL FETAL MEDICINE | Facility: CLINIC | Age: 31
End: 2021-08-27

## 2021-09-06 ENCOUNTER — PATIENT MESSAGE (OUTPATIENT)
Dept: OBSTETRICS AND GYNECOLOGY | Facility: CLINIC | Age: 31
End: 2021-09-06

## 2021-09-06 ENCOUNTER — HOSPITAL ENCOUNTER (EMERGENCY)
Facility: HOSPITAL | Age: 31
Discharge: LEFT BEFORE DISCHARGE | End: 2021-09-06
Attending: EMERGENCY MEDICINE | Admitting: OPHTHALMOLOGY
Payer: MEDICAID

## 2021-09-06 VITALS
BODY MASS INDEX: 31.65 KG/M2 | WEIGHT: 190 LBS | OXYGEN SATURATION: 99 % | RESPIRATION RATE: 18 BRPM | HEART RATE: 77 BPM | SYSTOLIC BLOOD PRESSURE: 112 MMHG | DIASTOLIC BLOOD PRESSURE: 58 MMHG | HEIGHT: 65 IN | TEMPERATURE: 99 F

## 2021-09-06 DIAGNOSIS — R11.10 NON-INTRACTABLE VOMITING, PRESENCE OF NAUSEA NOT SPECIFIED, UNSPECIFIED VOMITING TYPE: ICD-10-CM

## 2021-09-06 DIAGNOSIS — H43.11 VITREOUS HEMORRHAGE OF RIGHT EYE: Primary | ICD-10-CM

## 2021-09-06 DIAGNOSIS — Z3A.22 22 WEEKS GESTATION OF PREGNANCY: ICD-10-CM

## 2021-09-06 PROCEDURE — 99281 EMR DPT VST MAYX REQ PHY/QHP: CPT

## 2021-09-08 ENCOUNTER — PATIENT MESSAGE (OUTPATIENT)
Dept: MATERNAL FETAL MEDICINE | Facility: CLINIC | Age: 31
End: 2021-09-08

## 2021-09-08 ENCOUNTER — TELEPHONE (OUTPATIENT)
Dept: MATERNAL FETAL MEDICINE | Facility: CLINIC | Age: 31
End: 2021-09-08

## 2021-09-09 ENCOUNTER — TELEPHONE (OUTPATIENT)
Dept: OPHTHALMOLOGY | Facility: CLINIC | Age: 31
End: 2021-09-09

## 2021-09-15 PROBLEM — R29.3 POSTURAL IMBALANCE: Status: RESOLVED | Noted: 2021-05-19 | Resolved: 2021-09-15

## 2021-09-15 PROBLEM — M62.81 MUSCLE WEAKNESS OF UPPER EXTREMITY: Status: RESOLVED | Noted: 2021-05-19 | Resolved: 2021-09-15

## 2021-09-15 PROBLEM — M53.82 DECREASED ROM OF INTERVERTEBRAL DISCS OF CERVICAL SPINE: Status: RESOLVED | Noted: 2021-05-19 | Resolved: 2021-09-15

## 2021-09-22 ENCOUNTER — ROUTINE PRENATAL (OUTPATIENT)
Dept: OBSTETRICS AND GYNECOLOGY | Facility: CLINIC | Age: 31
End: 2021-09-22
Payer: MEDICAID

## 2021-09-22 ENCOUNTER — LAB VISIT (OUTPATIENT)
Dept: LAB | Facility: HOSPITAL | Age: 31
End: 2021-09-22
Attending: OBSTETRICS & GYNECOLOGY
Payer: MEDICAID

## 2021-09-22 VITALS — WEIGHT: 198 LBS | DIASTOLIC BLOOD PRESSURE: 62 MMHG | BODY MASS INDEX: 32.94 KG/M2 | SYSTOLIC BLOOD PRESSURE: 114 MMHG

## 2021-09-22 DIAGNOSIS — J45.20 MILD INTERMITTENT ASTHMA WITHOUT COMPLICATION: Primary | ICD-10-CM

## 2021-09-22 DIAGNOSIS — Z34.02 ENCOUNTER FOR SUPERVISION OF NORMAL FIRST PREGNANCY IN SECOND TRIMESTER: ICD-10-CM

## 2021-09-22 LAB
BASOPHILS # BLD AUTO: 0.03 K/UL (ref 0–0.2)
BASOPHILS NFR BLD: 0.3 % (ref 0–1.9)
DIFFERENTIAL METHOD: ABNORMAL
EOSINOPHIL # BLD AUTO: 0.1 K/UL (ref 0–0.5)
EOSINOPHIL NFR BLD: 1.2 % (ref 0–8)
ERYTHROCYTE [DISTWIDTH] IN BLOOD BY AUTOMATED COUNT: 13.4 % (ref 11.5–14.5)
GLUCOSE SERPL-MCNC: 110 MG/DL (ref 70–140)
HCT VFR BLD AUTO: 35.2 % (ref 37–48.5)
HGB BLD-MCNC: 11.2 G/DL (ref 12–16)
IMM GRANULOCYTES # BLD AUTO: 0.08 K/UL (ref 0–0.04)
IMM GRANULOCYTES NFR BLD AUTO: 0.7 % (ref 0–0.5)
LYMPHOCYTES # BLD AUTO: 1.9 K/UL (ref 1–4.8)
LYMPHOCYTES NFR BLD: 16.4 % (ref 18–48)
MCH RBC QN AUTO: 28 PG (ref 27–31)
MCHC RBC AUTO-ENTMCNC: 31.8 G/DL (ref 32–36)
MCV RBC AUTO: 88 FL (ref 82–98)
MONOCYTES # BLD AUTO: 0.8 K/UL (ref 0.3–1)
MONOCYTES NFR BLD: 7.2 % (ref 4–15)
NEUTROPHILS # BLD AUTO: 8.4 K/UL (ref 1.8–7.7)
NEUTROPHILS NFR BLD: 74.2 % (ref 38–73)
NRBC BLD-RTO: 0 /100 WBC
PLATELET # BLD AUTO: 290 K/UL (ref 150–450)
PMV BLD AUTO: 10.1 FL (ref 9.2–12.9)
RBC # BLD AUTO: 4 M/UL (ref 4–5.4)
WBC # BLD AUTO: 11.32 K/UL (ref 3.9–12.7)

## 2021-09-22 PROCEDURE — 99212 OFFICE O/P EST SF 10 MIN: CPT | Mod: PBBFAC,TH,PO | Performed by: OBSTETRICS & GYNECOLOGY

## 2021-09-22 PROCEDURE — 99213 OFFICE O/P EST LOW 20 MIN: CPT | Mod: TH,S$PBB,, | Performed by: OBSTETRICS & GYNECOLOGY

## 2021-09-22 PROCEDURE — 99999 PR PBB SHADOW E&M-EST. PATIENT-LVL II: ICD-10-PCS | Mod: PBBFAC,,, | Performed by: OBSTETRICS & GYNECOLOGY

## 2021-09-22 PROCEDURE — 99999 PR PBB SHADOW E&M-EST. PATIENT-LVL II: CPT | Mod: PBBFAC,,, | Performed by: OBSTETRICS & GYNECOLOGY

## 2021-09-22 PROCEDURE — 99213 PR OFFICE/OUTPT VISIT, EST, LEVL III, 20-29 MIN: ICD-10-PCS | Mod: TH,S$PBB,, | Performed by: OBSTETRICS & GYNECOLOGY

## 2021-09-22 PROCEDURE — 36415 COLL VENOUS BLD VENIPUNCTURE: CPT | Performed by: OBSTETRICS & GYNECOLOGY

## 2021-09-22 PROCEDURE — 85025 COMPLETE CBC W/AUTO DIFF WBC: CPT | Performed by: OBSTETRICS & GYNECOLOGY

## 2021-09-22 PROCEDURE — 82950 GLUCOSE TEST: CPT | Performed by: OBSTETRICS & GYNECOLOGY

## 2021-09-27 ENCOUNTER — PATIENT MESSAGE (OUTPATIENT)
Dept: ADMINISTRATIVE | Facility: OTHER | Age: 31
End: 2021-09-27

## 2021-10-06 ENCOUNTER — TELEPHONE (OUTPATIENT)
Dept: OPHTHALMOLOGY | Facility: CLINIC | Age: 31
End: 2021-10-06

## 2021-10-13 ENCOUNTER — PROCEDURE VISIT (OUTPATIENT)
Dept: MATERNAL FETAL MEDICINE | Facility: HOSPITAL | Age: 31
End: 2021-10-13
Attending: OBSTETRICS & GYNECOLOGY
Payer: MEDICAID

## 2021-10-13 VITALS
SYSTOLIC BLOOD PRESSURE: 112 MMHG | BODY MASS INDEX: 33.38 KG/M2 | DIASTOLIC BLOOD PRESSURE: 68 MMHG | WEIGHT: 200.63 LBS

## 2021-10-13 DIAGNOSIS — O35.EXX0 ENCOUNTER FOR REPEAT ULTRASOUND OF FETAL PYELECTASIS IN SINGLETON PREGNANCY, ANTEPARTUM: ICD-10-CM

## 2021-10-13 PROCEDURE — 76816 OB US FOLLOW-UP PER FETUS: CPT | Mod: 26,,, | Performed by: OBSTETRICS & GYNECOLOGY

## 2021-10-13 PROCEDURE — 76816 PR  US,PREGNANT UTERUS,F/U,TRANSABD APP: ICD-10-PCS | Mod: 26,,, | Performed by: OBSTETRICS & GYNECOLOGY

## 2021-10-20 ENCOUNTER — CLINICAL SUPPORT (OUTPATIENT)
Dept: OBSTETRICS AND GYNECOLOGY | Facility: CLINIC | Age: 31
End: 2021-10-20
Payer: MEDICAID

## 2021-10-20 ENCOUNTER — ROUTINE PRENATAL (OUTPATIENT)
Dept: OBSTETRICS AND GYNECOLOGY | Facility: CLINIC | Age: 31
End: 2021-10-20
Payer: MEDICAID

## 2021-10-20 VITALS
DIASTOLIC BLOOD PRESSURE: 64 MMHG | WEIGHT: 204.38 LBS | SYSTOLIC BLOOD PRESSURE: 108 MMHG | BODY MASS INDEX: 34.01 KG/M2

## 2021-10-20 DIAGNOSIS — Z23 NEED FOR PROPHYLACTIC VACCINATION WITH COMBINED DIPHTHERIA-TETANUS-PERTUSSIS (DTP) VACCINE: Primary | ICD-10-CM

## 2021-10-20 DIAGNOSIS — Z67.91 RH NEGATIVE STATE IN ANTEPARTUM PERIOD: ICD-10-CM

## 2021-10-20 DIAGNOSIS — O35.EXX0 ENCOUNTER FOR REPEAT ULTRASOUND OF FETAL PYELECTASIS IN SINGLETON PREGNANCY, ANTEPARTUM: ICD-10-CM

## 2021-10-20 DIAGNOSIS — O26.899 RH NEGATIVE STATE IN ANTEPARTUM PERIOD: ICD-10-CM

## 2021-10-20 DIAGNOSIS — Z34.02 ENCOUNTER FOR SUPERVISION OF NORMAL FIRST PREGNANCY IN SECOND TRIMESTER: ICD-10-CM

## 2021-10-20 PROCEDURE — 90471 IMMUNIZATION ADMIN: CPT | Mod: PBBFAC,PO

## 2021-10-20 PROCEDURE — 96372 THER/PROPH/DIAG INJ SC/IM: CPT | Mod: PBBFAC,PO

## 2021-10-20 PROCEDURE — 99999 PR PBB SHADOW E&M-EST. PATIENT-LVL III: ICD-10-PCS | Mod: PBBFAC,,, | Performed by: OBSTETRICS & GYNECOLOGY

## 2021-10-20 PROCEDURE — 99213 OFFICE O/P EST LOW 20 MIN: CPT | Mod: 25,PBBFAC,TH,PO | Performed by: OBSTETRICS & GYNECOLOGY

## 2021-10-20 PROCEDURE — 99213 PR OFFICE/OUTPT VISIT, EST, LEVL III, 20-29 MIN: ICD-10-PCS | Mod: TH,S$PBB,, | Performed by: OBSTETRICS & GYNECOLOGY

## 2021-10-20 PROCEDURE — 99999 PR PBB SHADOW E&M-EST. PATIENT-LVL III: CPT | Mod: PBBFAC,,, | Performed by: OBSTETRICS & GYNECOLOGY

## 2021-10-20 PROCEDURE — 99213 OFFICE O/P EST LOW 20 MIN: CPT | Mod: TH,S$PBB,, | Performed by: OBSTETRICS & GYNECOLOGY

## 2021-10-22 ENCOUNTER — PATIENT MESSAGE (OUTPATIENT)
Dept: OBSTETRICS AND GYNECOLOGY | Facility: CLINIC | Age: 31
End: 2021-10-22
Payer: MEDICAID

## 2021-11-02 ENCOUNTER — OFFICE VISIT (OUTPATIENT)
Dept: OPHTHALMOLOGY | Facility: CLINIC | Age: 31
End: 2021-11-02
Payer: MEDICAID

## 2021-11-02 DIAGNOSIS — H35.00 VALSALVA RETINOPATHY: ICD-10-CM

## 2021-11-02 DIAGNOSIS — H43.11 VITREOUS HEMORRHAGE, RIGHT: ICD-10-CM

## 2021-11-02 PROCEDURE — 92004 PR EYE EXAM, NEW PATIENT,COMPREHESV: ICD-10-PCS | Mod: S$PBB,,, | Performed by: OPHTHALMOLOGY

## 2021-11-02 PROCEDURE — 99213 OFFICE O/P EST LOW 20 MIN: CPT | Mod: PBBFAC | Performed by: OPHTHALMOLOGY

## 2021-11-02 PROCEDURE — 92201 OPSCPY EXTND RTA DRAW UNI/BI: CPT | Mod: PBBFAC | Performed by: OPHTHALMOLOGY

## 2021-11-02 PROCEDURE — 99999 PR PBB SHADOW E&M-EST. PATIENT-LVL III: CPT | Mod: PBBFAC,,, | Performed by: OPHTHALMOLOGY

## 2021-11-02 PROCEDURE — 92004 COMPRE OPH EXAM NEW PT 1/>: CPT | Mod: S$PBB,,, | Performed by: OPHTHALMOLOGY

## 2021-11-02 PROCEDURE — 99999 PR PBB SHADOW E&M-EST. PATIENT-LVL III: ICD-10-PCS | Mod: PBBFAC,,, | Performed by: OPHTHALMOLOGY

## 2021-11-02 PROCEDURE — 92201 OPSCPY EXTND RTA DRAW UNI/BI: CPT | Mod: S$PBB,,, | Performed by: OPHTHALMOLOGY

## 2021-11-02 PROCEDURE — 92201 PR OPHTHALMOSCOPY, EXT, W/RET DRAW/SCLERAL DEPR, I&R, UNI/BI: ICD-10-PCS | Mod: S$PBB,,, | Performed by: OPHTHALMOLOGY

## 2021-11-03 ENCOUNTER — ROUTINE PRENATAL (OUTPATIENT)
Dept: OBSTETRICS AND GYNECOLOGY | Facility: CLINIC | Age: 31
End: 2021-11-03
Payer: MEDICAID

## 2021-11-03 VITALS — WEIGHT: 204.81 LBS | SYSTOLIC BLOOD PRESSURE: 98 MMHG | DIASTOLIC BLOOD PRESSURE: 62 MMHG | BODY MASS INDEX: 34.08 KG/M2

## 2021-11-03 DIAGNOSIS — Z34.03 ENCOUNTER FOR SUPERVISION OF NORMAL FIRST PREGNANCY IN THIRD TRIMESTER: Primary | ICD-10-CM

## 2021-11-03 PROCEDURE — 99999 PR PBB SHADOW E&M-EST. PATIENT-LVL II: CPT | Mod: PBBFAC,,, | Performed by: OBSTETRICS & GYNECOLOGY

## 2021-11-03 PROCEDURE — 99999 PR PBB SHADOW E&M-EST. PATIENT-LVL II: ICD-10-PCS | Mod: PBBFAC,,, | Performed by: OBSTETRICS & GYNECOLOGY

## 2021-11-03 PROCEDURE — 99213 PR OFFICE/OUTPT VISIT, EST, LEVL III, 20-29 MIN: ICD-10-PCS | Mod: TH,S$PBB,, | Performed by: OBSTETRICS & GYNECOLOGY

## 2021-11-03 PROCEDURE — 99212 OFFICE O/P EST SF 10 MIN: CPT | Mod: PBBFAC,TH,PO | Performed by: OBSTETRICS & GYNECOLOGY

## 2021-11-03 PROCEDURE — 99213 OFFICE O/P EST LOW 20 MIN: CPT | Mod: TH,S$PBB,, | Performed by: OBSTETRICS & GYNECOLOGY

## 2021-11-09 ENCOUNTER — PATIENT MESSAGE (OUTPATIENT)
Dept: MATERNAL FETAL MEDICINE | Facility: CLINIC | Age: 31
End: 2021-11-09
Payer: MEDICAID

## 2021-11-10 ENCOUNTER — OFFICE VISIT (OUTPATIENT)
Dept: MATERNAL FETAL MEDICINE | Facility: CLINIC | Age: 31
End: 2021-11-10
Payer: MEDICAID

## 2021-11-10 ENCOUNTER — PROCEDURE VISIT (OUTPATIENT)
Dept: MATERNAL FETAL MEDICINE | Facility: CLINIC | Age: 31
End: 2021-11-10
Payer: MEDICAID

## 2021-11-10 VITALS
BODY MASS INDEX: 33.94 KG/M2 | DIASTOLIC BLOOD PRESSURE: 62 MMHG | WEIGHT: 203.69 LBS | HEIGHT: 65 IN | SYSTOLIC BLOOD PRESSURE: 100 MMHG

## 2021-11-10 DIAGNOSIS — O35.EXX0 ENCOUNTER FOR REPEAT ULTRASOUND OF FETAL PYELECTASIS IN SINGLETON PREGNANCY, ANTEPARTUM: ICD-10-CM

## 2021-11-10 DIAGNOSIS — O35.EXX0 PYELECTASIS OF FETUS ON PRENATAL ULTRASOUND: ICD-10-CM

## 2021-11-10 PROCEDURE — 76816 OB US FOLLOW-UP PER FETUS: CPT | Mod: 26,S$PBB,, | Performed by: OBSTETRICS & GYNECOLOGY

## 2021-11-10 PROCEDURE — 99202 OFFICE O/P NEW SF 15 MIN: CPT | Mod: 25,S$PBB,TH, | Performed by: OBSTETRICS & GYNECOLOGY

## 2021-11-10 PROCEDURE — 99202 PR OFFICE/OUTPT VISIT, NEW, LEVL II, 15-29 MIN: ICD-10-PCS | Mod: 25,S$PBB,TH, | Performed by: OBSTETRICS & GYNECOLOGY

## 2021-11-10 PROCEDURE — 99213 OFFICE O/P EST LOW 20 MIN: CPT | Mod: PBBFAC,TH | Performed by: OBSTETRICS & GYNECOLOGY

## 2021-11-10 PROCEDURE — 76816 PR  US,PREGNANT UTERUS,F/U,TRANSABD APP: ICD-10-PCS | Mod: 26,S$PBB,, | Performed by: OBSTETRICS & GYNECOLOGY

## 2021-11-10 PROCEDURE — 99999 PR PBB SHADOW E&M-EST. PATIENT-LVL III: ICD-10-PCS | Mod: PBBFAC,,, | Performed by: OBSTETRICS & GYNECOLOGY

## 2021-11-10 PROCEDURE — 76816 OB US FOLLOW-UP PER FETUS: CPT | Mod: PBBFAC | Performed by: OBSTETRICS & GYNECOLOGY

## 2021-11-10 PROCEDURE — 99999 PR PBB SHADOW E&M-EST. PATIENT-LVL III: CPT | Mod: PBBFAC,,, | Performed by: OBSTETRICS & GYNECOLOGY

## 2021-11-15 ENCOUNTER — PATIENT MESSAGE (OUTPATIENT)
Dept: ADMINISTRATIVE | Facility: OTHER | Age: 31
End: 2021-11-15
Payer: MEDICAID

## 2021-11-17 ENCOUNTER — ROUTINE PRENATAL (OUTPATIENT)
Dept: OBSTETRICS AND GYNECOLOGY | Facility: CLINIC | Age: 31
End: 2021-11-17
Payer: MEDICAID

## 2021-11-17 VITALS
SYSTOLIC BLOOD PRESSURE: 112 MMHG | BODY MASS INDEX: 34.34 KG/M2 | DIASTOLIC BLOOD PRESSURE: 68 MMHG | WEIGHT: 206.38 LBS

## 2021-11-17 DIAGNOSIS — Z34.03 ENCOUNTER FOR SUPERVISION OF NORMAL FIRST PREGNANCY IN THIRD TRIMESTER: Primary | ICD-10-CM

## 2021-11-17 PROCEDURE — 99213 OFFICE O/P EST LOW 20 MIN: CPT | Mod: TH,S$PBB,, | Performed by: OBSTETRICS & GYNECOLOGY

## 2021-11-17 PROCEDURE — 99212 OFFICE O/P EST SF 10 MIN: CPT | Mod: PBBFAC,TH,PO | Performed by: OBSTETRICS & GYNECOLOGY

## 2021-11-17 PROCEDURE — 99999 PR PBB SHADOW E&M-EST. PATIENT-LVL II: CPT | Mod: PBBFAC,,, | Performed by: OBSTETRICS & GYNECOLOGY

## 2021-11-17 PROCEDURE — 99213 PR OFFICE/OUTPT VISIT, EST, LEVL III, 20-29 MIN: ICD-10-PCS | Mod: TH,S$PBB,, | Performed by: OBSTETRICS & GYNECOLOGY

## 2021-11-17 PROCEDURE — 99999 PR PBB SHADOW E&M-EST. PATIENT-LVL II: ICD-10-PCS | Mod: PBBFAC,,, | Performed by: OBSTETRICS & GYNECOLOGY

## 2021-11-22 ENCOUNTER — PATIENT MESSAGE (OUTPATIENT)
Dept: OBSTETRICS AND GYNECOLOGY | Facility: CLINIC | Age: 31
End: 2021-11-22
Payer: MEDICAID

## 2021-11-30 ENCOUNTER — PATIENT MESSAGE (OUTPATIENT)
Dept: OBSTETRICS AND GYNECOLOGY | Facility: CLINIC | Age: 31
End: 2021-11-30
Payer: MEDICAID

## 2021-12-01 ENCOUNTER — ROUTINE PRENATAL (OUTPATIENT)
Dept: OBSTETRICS AND GYNECOLOGY | Facility: CLINIC | Age: 31
End: 2021-12-01
Payer: MEDICAID

## 2021-12-01 VITALS — BODY MASS INDEX: 35.11 KG/M2 | SYSTOLIC BLOOD PRESSURE: 112 MMHG | DIASTOLIC BLOOD PRESSURE: 66 MMHG | WEIGHT: 211 LBS

## 2021-12-01 DIAGNOSIS — Z34.03 ENCOUNTER FOR SUPERVISION OF NORMAL FIRST PREGNANCY IN THIRD TRIMESTER: Primary | ICD-10-CM

## 2021-12-01 PROCEDURE — 99999 PR PBB SHADOW E&M-EST. PATIENT-LVL II: CPT | Mod: PBBFAC,,, | Performed by: OBSTETRICS & GYNECOLOGY

## 2021-12-01 PROCEDURE — 99213 PR OFFICE/OUTPT VISIT, EST, LEVL III, 20-29 MIN: ICD-10-PCS | Mod: TH,S$PBB,, | Performed by: OBSTETRICS & GYNECOLOGY

## 2021-12-01 PROCEDURE — 99999 PR PBB SHADOW E&M-EST. PATIENT-LVL II: ICD-10-PCS | Mod: PBBFAC,,, | Performed by: OBSTETRICS & GYNECOLOGY

## 2021-12-01 PROCEDURE — 99213 OFFICE O/P EST LOW 20 MIN: CPT | Mod: TH,S$PBB,, | Performed by: OBSTETRICS & GYNECOLOGY

## 2021-12-01 PROCEDURE — 99212 OFFICE O/P EST SF 10 MIN: CPT | Mod: PBBFAC,TH,PO | Performed by: OBSTETRICS & GYNECOLOGY

## 2021-12-02 ENCOUNTER — HOSPITAL ENCOUNTER (OUTPATIENT)
Dept: OBSTETRICS AND GYNECOLOGY | Facility: HOSPITAL | Age: 31
Discharge: HOME | End: 2021-12-02
Attending: OBSTETRICS & GYNECOLOGY
Payer: MEDICAID

## 2021-12-15 ENCOUNTER — ROUTINE PRENATAL (OUTPATIENT)
Dept: OBSTETRICS AND GYNECOLOGY | Facility: CLINIC | Age: 31
End: 2021-12-15
Payer: MEDICAID

## 2021-12-15 ENCOUNTER — PROCEDURE VISIT (OUTPATIENT)
Dept: OBSTETRICS AND GYNECOLOGY | Facility: CLINIC | Age: 31
End: 2021-12-15
Payer: MEDICAID

## 2021-12-15 VITALS
WEIGHT: 214.75 LBS | BODY MASS INDEX: 35.73 KG/M2 | DIASTOLIC BLOOD PRESSURE: 70 MMHG | SYSTOLIC BLOOD PRESSURE: 108 MMHG

## 2021-12-15 DIAGNOSIS — Z36.85 ANTENATAL SCREENING FOR STREPTOCOCCUS B: ICD-10-CM

## 2021-12-15 DIAGNOSIS — Z32.01 POSITIVE URINE PREGNANCY TEST: ICD-10-CM

## 2021-12-15 DIAGNOSIS — Z34.03 ENCOUNTER FOR SUPERVISION OF NORMAL FIRST PREGNANCY IN THIRD TRIMESTER: Primary | ICD-10-CM

## 2021-12-15 PROCEDURE — 99999 PR PBB SHADOW E&M-EST. PATIENT-LVL II: CPT | Mod: PBBFAC,,, | Performed by: OBSTETRICS & GYNECOLOGY

## 2021-12-15 PROCEDURE — 99213 PR OFFICE/OUTPT VISIT, EST, LEVL III, 20-29 MIN: ICD-10-PCS | Mod: TH,S$PBB,, | Performed by: OBSTETRICS & GYNECOLOGY

## 2021-12-15 PROCEDURE — 76816 OB US FOLLOW-UP PER FETUS: CPT | Mod: PBBFAC,PO | Performed by: OBSTETRICS & GYNECOLOGY

## 2021-12-15 PROCEDURE — 99213 OFFICE O/P EST LOW 20 MIN: CPT | Mod: TH,S$PBB,, | Performed by: OBSTETRICS & GYNECOLOGY

## 2021-12-15 PROCEDURE — 99999 PR PBB SHADOW E&M-EST. PATIENT-LVL II: ICD-10-PCS | Mod: PBBFAC,,, | Performed by: OBSTETRICS & GYNECOLOGY

## 2021-12-15 PROCEDURE — 99212 OFFICE O/P EST SF 10 MIN: CPT | Mod: PBBFAC,TH,PO | Performed by: OBSTETRICS & GYNECOLOGY

## 2021-12-15 PROCEDURE — 76816 US OB/GYN PROCEDURE (VIEWPOINT): ICD-10-PCS | Mod: 26,S$PBB,, | Performed by: OBSTETRICS & GYNECOLOGY

## 2021-12-15 PROCEDURE — 87081 CULTURE SCREEN ONLY: CPT | Performed by: OBSTETRICS & GYNECOLOGY

## 2021-12-20 ENCOUNTER — CLINICAL SUPPORT (OUTPATIENT)
Dept: OBSTETRICS AND GYNECOLOGY | Facility: CLINIC | Age: 31
End: 2021-12-20
Payer: MEDICAID

## 2021-12-20 ENCOUNTER — ROUTINE PRENATAL (OUTPATIENT)
Dept: OBSTETRICS AND GYNECOLOGY | Facility: CLINIC | Age: 31
End: 2021-12-20
Payer: MEDICAID

## 2021-12-20 ENCOUNTER — LAB VISIT (OUTPATIENT)
Dept: LAB | Facility: HOSPITAL | Age: 31
End: 2021-12-20
Attending: OBSTETRICS & GYNECOLOGY
Payer: MEDICAID

## 2021-12-20 VITALS
DIASTOLIC BLOOD PRESSURE: 70 MMHG | SYSTOLIC BLOOD PRESSURE: 116 MMHG | BODY MASS INDEX: 35.73 KG/M2 | WEIGHT: 214.75 LBS

## 2021-12-20 DIAGNOSIS — Z34.02 ENCOUNTER FOR SUPERVISION OF NORMAL FIRST PREGNANCY IN SECOND TRIMESTER: ICD-10-CM

## 2021-12-20 DIAGNOSIS — Z34.03 ENCOUNTER FOR SUPERVISION OF NORMAL FIRST PREGNANCY IN THIRD TRIMESTER: Primary | ICD-10-CM

## 2021-12-20 DIAGNOSIS — Z23 NEED FOR PROPHYLACTIC VACCINATION AND INOCULATION AGAINST INFLUENZA: Primary | ICD-10-CM

## 2021-12-20 LAB
BACTERIA SPEC AEROBE CULT: NORMAL
BASOPHILS # BLD AUTO: 0.03 K/UL (ref 0–0.2)
BASOPHILS NFR BLD: 0.2 % (ref 0–1.9)
DIFFERENTIAL METHOD: ABNORMAL
EOSINOPHIL # BLD AUTO: 0.1 K/UL (ref 0–0.5)
EOSINOPHIL NFR BLD: 0.8 % (ref 0–8)
ERYTHROCYTE [DISTWIDTH] IN BLOOD BY AUTOMATED COUNT: 13.5 % (ref 11.5–14.5)
HCT VFR BLD AUTO: 39.1 % (ref 37–48.5)
HGB BLD-MCNC: 12.7 G/DL (ref 12–16)
IMM GRANULOCYTES # BLD AUTO: 0.15 K/UL (ref 0–0.04)
IMM GRANULOCYTES NFR BLD AUTO: 1.2 % (ref 0–0.5)
LYMPHOCYTES # BLD AUTO: 1.9 K/UL (ref 1–4.8)
LYMPHOCYTES NFR BLD: 15.5 % (ref 18–48)
MCH RBC QN AUTO: 28.5 PG (ref 27–31)
MCHC RBC AUTO-ENTMCNC: 32.5 G/DL (ref 32–36)
MCV RBC AUTO: 88 FL (ref 82–98)
MONOCYTES # BLD AUTO: 1 K/UL (ref 0.3–1)
MONOCYTES NFR BLD: 8.6 % (ref 4–15)
NEUTROPHILS # BLD AUTO: 9 K/UL (ref 1.8–7.7)
NEUTROPHILS NFR BLD: 73.7 % (ref 38–73)
NRBC BLD-RTO: 0 /100 WBC
PLATELET # BLD AUTO: 280 K/UL (ref 150–450)
PMV BLD AUTO: 9.9 FL (ref 9.2–12.9)
RBC # BLD AUTO: 4.45 M/UL (ref 4–5.4)
WBC # BLD AUTO: 12.16 K/UL (ref 3.9–12.7)

## 2021-12-20 PROCEDURE — 99213 OFFICE O/P EST LOW 20 MIN: CPT | Mod: TH,S$PBB,, | Performed by: OBSTETRICS & GYNECOLOGY

## 2021-12-20 PROCEDURE — 99999 PR PBB SHADOW E&M-EST. PATIENT-LVL II: CPT | Mod: PBBFAC,,, | Performed by: OBSTETRICS & GYNECOLOGY

## 2021-12-20 PROCEDURE — 86592 SYPHILIS TEST NON-TREP QUAL: CPT | Performed by: OBSTETRICS & GYNECOLOGY

## 2021-12-20 PROCEDURE — 85025 COMPLETE CBC W/AUTO DIFF WBC: CPT | Performed by: OBSTETRICS & GYNECOLOGY

## 2021-12-20 PROCEDURE — 99999 PR PBB SHADOW E&M-EST. PATIENT-LVL II: ICD-10-PCS | Mod: PBBFAC,,, | Performed by: OBSTETRICS & GYNECOLOGY

## 2021-12-20 PROCEDURE — 36415 COLL VENOUS BLD VENIPUNCTURE: CPT | Performed by: OBSTETRICS & GYNECOLOGY

## 2021-12-20 PROCEDURE — 87389 HIV-1 AG W/HIV-1&-2 AB AG IA: CPT | Performed by: OBSTETRICS & GYNECOLOGY

## 2021-12-20 PROCEDURE — 99213 PR OFFICE/OUTPT VISIT, EST, LEVL III, 20-29 MIN: ICD-10-PCS | Mod: TH,S$PBB,, | Performed by: OBSTETRICS & GYNECOLOGY

## 2021-12-20 PROCEDURE — 99212 OFFICE O/P EST SF 10 MIN: CPT | Mod: PBBFAC,TH,PO | Performed by: OBSTETRICS & GYNECOLOGY

## 2021-12-20 PROCEDURE — 90686 IIV4 VACC NO PRSV 0.5 ML IM: CPT | Mod: PBBFAC,PO

## 2021-12-21 LAB
HIV 1+2 AB+HIV1 P24 AG SERPL QL IA: NEGATIVE
RPR SER QL: NORMAL

## 2021-12-27 ENCOUNTER — PATIENT MESSAGE (OUTPATIENT)
Dept: OBSTETRICS AND GYNECOLOGY | Facility: CLINIC | Age: 31
End: 2021-12-27
Payer: MEDICAID

## 2021-12-29 ENCOUNTER — ROUTINE PRENATAL (OUTPATIENT)
Dept: OBSTETRICS AND GYNECOLOGY | Facility: CLINIC | Age: 31
End: 2021-12-29
Payer: MEDICAID

## 2021-12-29 VITALS
WEIGHT: 217.81 LBS | DIASTOLIC BLOOD PRESSURE: 62 MMHG | SYSTOLIC BLOOD PRESSURE: 114 MMHG | BODY MASS INDEX: 36.25 KG/M2

## 2021-12-29 DIAGNOSIS — Z34.03 ENCOUNTER FOR SUPERVISION OF NORMAL FIRST PREGNANCY IN THIRD TRIMESTER: Primary | ICD-10-CM

## 2021-12-29 PROCEDURE — 99212 OFFICE O/P EST SF 10 MIN: CPT | Mod: PBBFAC,TH,PO | Performed by: OBSTETRICS & GYNECOLOGY

## 2021-12-29 PROCEDURE — 99213 PR OFFICE/OUTPT VISIT, EST, LEVL III, 20-29 MIN: ICD-10-PCS | Mod: TH,S$PBB,, | Performed by: OBSTETRICS & GYNECOLOGY

## 2021-12-29 PROCEDURE — 99999 PR PBB SHADOW E&M-EST. PATIENT-LVL II: CPT | Mod: PBBFAC,,, | Performed by: OBSTETRICS & GYNECOLOGY

## 2021-12-29 PROCEDURE — 99999 PR PBB SHADOW E&M-EST. PATIENT-LVL II: ICD-10-PCS | Mod: PBBFAC,,, | Performed by: OBSTETRICS & GYNECOLOGY

## 2021-12-29 PROCEDURE — 99213 OFFICE O/P EST LOW 20 MIN: CPT | Mod: TH,S$PBB,, | Performed by: OBSTETRICS & GYNECOLOGY

## 2022-01-04 ENCOUNTER — PATIENT MESSAGE (OUTPATIENT)
Dept: OBSTETRICS AND GYNECOLOGY | Facility: CLINIC | Age: 32
End: 2022-01-04
Payer: MEDICAID

## 2022-01-05 ENCOUNTER — ROUTINE PRENATAL (OUTPATIENT)
Dept: OBSTETRICS AND GYNECOLOGY | Facility: CLINIC | Age: 32
End: 2022-01-05
Payer: MEDICAID

## 2022-01-05 VITALS
BODY MASS INDEX: 36.98 KG/M2 | WEIGHT: 222.25 LBS | DIASTOLIC BLOOD PRESSURE: 74 MMHG | SYSTOLIC BLOOD PRESSURE: 110 MMHG

## 2022-01-05 DIAGNOSIS — Z34.90 ENCOUNTER FOR ELECTIVE INDUCTION OF LABOR: ICD-10-CM

## 2022-01-05 DIAGNOSIS — Z34.03 ENCOUNTER FOR SUPERVISION OF NORMAL FIRST PREGNANCY IN THIRD TRIMESTER: Primary | ICD-10-CM

## 2022-01-05 PROCEDURE — 99212 OFFICE O/P EST SF 10 MIN: CPT | Mod: PBBFAC,TH,PO | Performed by: OBSTETRICS & GYNECOLOGY

## 2022-01-05 PROCEDURE — 99213 OFFICE O/P EST LOW 20 MIN: CPT | Mod: TH,S$PBB,, | Performed by: OBSTETRICS & GYNECOLOGY

## 2022-01-05 PROCEDURE — 99213 PR OFFICE/OUTPT VISIT, EST, LEVL III, 20-29 MIN: ICD-10-PCS | Mod: TH,S$PBB,, | Performed by: OBSTETRICS & GYNECOLOGY

## 2022-01-05 PROCEDURE — 99999 PR PBB SHADOW E&M-EST. PATIENT-LVL II: CPT | Mod: PBBFAC,,, | Performed by: OBSTETRICS & GYNECOLOGY

## 2022-01-05 PROCEDURE — 99999 PR PBB SHADOW E&M-EST. PATIENT-LVL II: ICD-10-PCS | Mod: PBBFAC,,, | Performed by: OBSTETRICS & GYNECOLOGY

## 2022-01-05 RX ORDER — MISOPROSTOL 100 UG/1
800 TABLET ORAL
Status: CANCELLED | OUTPATIENT
Start: 2022-01-05

## 2022-01-05 RX ORDER — PROCHLORPERAZINE EDISYLATE 5 MG/ML
5 INJECTION INTRAMUSCULAR; INTRAVENOUS EVERY 6 HOURS PRN
Status: CANCELLED | OUTPATIENT
Start: 2022-01-05

## 2022-01-05 RX ORDER — CARBOPROST TROMETHAMINE 250 UG/ML
250 INJECTION, SOLUTION INTRAMUSCULAR
Status: CANCELLED | OUTPATIENT
Start: 2022-01-05

## 2022-01-05 RX ORDER — OXYTOCIN/RINGER'S LACTATE 30/500 ML
0-30 PLASTIC BAG, INJECTION (ML) INTRAVENOUS CONTINUOUS
Status: CANCELLED | OUTPATIENT
Start: 2022-01-05

## 2022-01-05 RX ORDER — MUPIROCIN 20 MG/G
OINTMENT TOPICAL
Status: CANCELLED | OUTPATIENT
Start: 2022-01-05

## 2022-01-05 RX ORDER — CALCIUM CARBONATE 200(500)MG
500 TABLET,CHEWABLE ORAL 3 TIMES DAILY PRN
Status: CANCELLED | OUTPATIENT
Start: 2022-01-05

## 2022-01-05 RX ORDER — OXYTOCIN/RINGER'S LACTATE 30/500 ML
95 PLASTIC BAG, INJECTION (ML) INTRAVENOUS ONCE
Status: CANCELLED | OUTPATIENT
Start: 2022-01-05 | End: 2022-01-05

## 2022-01-05 RX ORDER — SODIUM CHLORIDE, SODIUM LACTATE, POTASSIUM CHLORIDE, CALCIUM CHLORIDE 600; 310; 30; 20 MG/100ML; MG/100ML; MG/100ML; MG/100ML
INJECTION, SOLUTION INTRAVENOUS CONTINUOUS
Status: CANCELLED | OUTPATIENT
Start: 2022-01-05

## 2022-01-05 RX ORDER — ONDANSETRON 4 MG/1
8 TABLET, ORALLY DISINTEGRATING ORAL EVERY 8 HOURS PRN
Status: CANCELLED | OUTPATIENT
Start: 2022-01-05

## 2022-01-05 RX ORDER — DIPHENOXYLATE HYDROCHLORIDE AND ATROPINE SULFATE 2.5; .025 MG/1; MG/1
1 TABLET ORAL 4 TIMES DAILY PRN
Status: CANCELLED | OUTPATIENT
Start: 2022-01-05

## 2022-01-05 RX ORDER — SODIUM CHLORIDE 9 MG/ML
INJECTION, SOLUTION INTRAVENOUS
Status: CANCELLED | OUTPATIENT
Start: 2022-01-05

## 2022-01-05 RX ORDER — OXYTOCIN/RINGER'S LACTATE 30/500 ML
334 PLASTIC BAG, INJECTION (ML) INTRAVENOUS ONCE
Status: CANCELLED | OUTPATIENT
Start: 2022-01-05 | End: 2022-01-05

## 2022-01-05 RX ORDER — SIMETHICONE 80 MG
1 TABLET,CHEWABLE ORAL 4 TIMES DAILY PRN
Status: CANCELLED | OUTPATIENT
Start: 2022-01-05

## 2022-01-05 RX ORDER — METHYLERGONOVINE MALEATE 0.2 MG/ML
200 INJECTION INTRAVENOUS
Status: CANCELLED | OUTPATIENT
Start: 2022-01-05

## 2022-01-05 NOTE — TELEPHONE ENCOUNTER
I don't think its necessary at this time but its her decision. If she is already leaking milk she can definitely collect and store it. We would want the baby to go directly to the breast instead of giving a bottle.     Katharina Quick MD, FACOG  OB/GYN

## 2022-01-05 NOTE — PROGRESS NOTES
Patient reports normal fetal movement. Denies vaginal bleeding, regular contractions or leakage of fluid. Pt desires induction - set for next week 1/11/2022 if no spontaneous labor.

## 2022-01-11 ENCOUNTER — ANESTHESIA EVENT (OUTPATIENT)
Dept: OBSTETRICS AND GYNECOLOGY | Facility: HOSPITAL | Age: 32
End: 2022-01-11
Payer: MEDICAID

## 2022-01-11 ENCOUNTER — HOSPITAL ENCOUNTER (INPATIENT)
Facility: HOSPITAL | Age: 32
LOS: 3 days | Discharge: HOME | End: 2022-01-14
Attending: OBSTETRICS & GYNECOLOGY | Admitting: OBSTETRICS & GYNECOLOGY
Payer: MEDICAID

## 2022-01-11 ENCOUNTER — ANESTHESIA (OUTPATIENT)
Dept: OBSTETRICS AND GYNECOLOGY | Facility: HOSPITAL | Age: 32
End: 2022-01-11
Payer: MEDICAID

## 2022-01-11 DIAGNOSIS — Z37.9 NORMAL LABOR: ICD-10-CM

## 2022-01-11 DIAGNOSIS — Z34.90 ENCOUNTER FOR ELECTIVE INDUCTION OF LABOR: ICD-10-CM

## 2022-01-11 DIAGNOSIS — Z34.03 ENCOUNTER FOR SUPERVISION OF NORMAL FIRST PREGNANCY IN THIRD TRIMESTER: ICD-10-CM

## 2022-01-11 LAB
ABO + RH BLD: NORMAL
BASOPHILS # BLD AUTO: 0.03 K/UL (ref 0–0.2)
BASOPHILS NFR BLD: 0.2 % (ref 0–1.9)
BLD GP AB SCN CELLS X3 SERPL QL: NORMAL
DIFFERENTIAL METHOD: ABNORMAL
EOSINOPHIL # BLD AUTO: 0.1 K/UL (ref 0–0.5)
EOSINOPHIL NFR BLD: 0.8 % (ref 0–8)
ERYTHROCYTE [DISTWIDTH] IN BLOOD BY AUTOMATED COUNT: 13.2 % (ref 11.5–14.5)
HCT VFR BLD AUTO: 34.4 % (ref 37–48.5)
HGB BLD-MCNC: 11.4 G/DL (ref 12–16)
IMM GRANULOCYTES # BLD AUTO: 0.16 K/UL (ref 0–0.04)
IMM GRANULOCYTES NFR BLD AUTO: 1.1 % (ref 0–0.5)
LYMPHOCYTES # BLD AUTO: 2.3 K/UL (ref 1–4.8)
LYMPHOCYTES NFR BLD: 15.8 % (ref 18–48)
MCH RBC QN AUTO: 28.6 PG (ref 27–31)
MCHC RBC AUTO-ENTMCNC: 33.1 G/DL (ref 32–36)
MCV RBC AUTO: 86 FL (ref 82–98)
MONOCYTES # BLD AUTO: 1.3 K/UL (ref 0.3–1)
MONOCYTES NFR BLD: 8.6 % (ref 4–15)
NEUTROPHILS # BLD AUTO: 10.7 K/UL (ref 1.8–7.7)
NEUTROPHILS NFR BLD: 73.5 % (ref 38–73)
NRBC BLD-RTO: 0 /100 WBC
PLATELET # BLD AUTO: 236 K/UL (ref 150–450)
PMV BLD AUTO: 10 FL (ref 9.2–12.9)
RBC # BLD AUTO: 3.99 M/UL (ref 4–5.4)
SARS-COV-2 RDRP RESP QL NAA+PROBE: NEGATIVE
WBC # BLD AUTO: 14.51 K/UL (ref 3.9–12.7)

## 2022-01-11 PROCEDURE — 36415 COLL VENOUS BLD VENIPUNCTURE: CPT | Performed by: OBSTETRICS & GYNECOLOGY

## 2022-01-11 PROCEDURE — 86900 BLOOD TYPING SEROLOGIC ABO: CPT | Performed by: OBSTETRICS & GYNECOLOGY

## 2022-01-11 PROCEDURE — 11000001 HC ACUTE MED/SURG PRIVATE ROOM

## 2022-01-11 PROCEDURE — U0002 COVID-19 LAB TEST NON-CDC: HCPCS | Performed by: OBSTETRICS & GYNECOLOGY

## 2022-01-11 PROCEDURE — 85025 COMPLETE CBC W/AUTO DIFF WBC: CPT | Performed by: OBSTETRICS & GYNECOLOGY

## 2022-01-11 PROCEDURE — 72100002 HC LABOR CARE, 1ST 8 HOURS

## 2022-01-11 PROCEDURE — 27200710 HC EPIDURAL INFUSION PUMP SET: Performed by: ANESTHESIOLOGY

## 2022-01-11 PROCEDURE — 86901 BLOOD TYPING SEROLOGIC RH(D): CPT | Performed by: OBSTETRICS & GYNECOLOGY

## 2022-01-11 PROCEDURE — 62326 NJX INTERLAMINAR LMBR/SAC: CPT | Performed by: STUDENT IN AN ORGANIZED HEALTH CARE EDUCATION/TRAINING PROGRAM

## 2022-01-11 PROCEDURE — 25000003 PHARM REV CODE 250: Performed by: STUDENT IN AN ORGANIZED HEALTH CARE EDUCATION/TRAINING PROGRAM

## 2022-01-11 PROCEDURE — 51702 INSERT TEMP BLADDER CATH: CPT

## 2022-01-11 PROCEDURE — 63600175 PHARM REV CODE 636 W HCPCS: Performed by: OBSTETRICS & GYNECOLOGY

## 2022-01-11 PROCEDURE — 27800516 HC TRAY, EPIDURAL COMBO: Performed by: ANESTHESIOLOGY

## 2022-01-11 RX ORDER — FENTANYL/ROPIVACAINE/NS/PF 2MCG/ML-.2
PLASTIC BAG, INJECTION (ML) INJECTION
Status: COMPLETED
Start: 2022-01-11 | End: 2022-01-11

## 2022-01-11 RX ORDER — SODIUM CHLORIDE 9 MG/ML
INJECTION, SOLUTION INTRAVENOUS
Status: DISCONTINUED | OUTPATIENT
Start: 2022-01-11 | End: 2022-01-12

## 2022-01-11 RX ORDER — OXYTOCIN/RINGER'S LACTATE 30/500 ML
0-30 PLASTIC BAG, INJECTION (ML) INTRAVENOUS CONTINUOUS
Status: DISCONTINUED | OUTPATIENT
Start: 2022-01-11 | End: 2022-01-12

## 2022-01-11 RX ORDER — SIMETHICONE 80 MG
1 TABLET,CHEWABLE ORAL 4 TIMES DAILY PRN
Status: DISCONTINUED | OUTPATIENT
Start: 2022-01-11 | End: 2022-01-12

## 2022-01-11 RX ORDER — PROCHLORPERAZINE EDISYLATE 5 MG/ML
5 INJECTION INTRAMUSCULAR; INTRAVENOUS EVERY 6 HOURS PRN
Status: DISCONTINUED | OUTPATIENT
Start: 2022-01-11 | End: 2022-01-12

## 2022-01-11 RX ORDER — SODIUM CHLORIDE, SODIUM LACTATE, POTASSIUM CHLORIDE, CALCIUM CHLORIDE 600; 310; 30; 20 MG/100ML; MG/100ML; MG/100ML; MG/100ML
INJECTION, SOLUTION INTRAVENOUS CONTINUOUS
Status: DISCONTINUED | OUTPATIENT
Start: 2022-01-11 | End: 2022-01-12

## 2022-01-11 RX ORDER — OXYTOCIN/RINGER'S LACTATE 30/500 ML
334 PLASTIC BAG, INJECTION (ML) INTRAVENOUS ONCE
Status: DISCONTINUED | OUTPATIENT
Start: 2022-01-11 | End: 2022-01-12

## 2022-01-11 RX ORDER — FAMOTIDINE 10 MG/ML
20 INJECTION INTRAVENOUS ONCE
Status: CANCELLED | OUTPATIENT
Start: 2022-01-11 | End: 2022-01-11

## 2022-01-11 RX ORDER — TRANEXAMIC ACID 100 MG/ML
1000 INJECTION, SOLUTION INTRAVENOUS ONCE AS NEEDED
Status: DISCONTINUED | OUTPATIENT
Start: 2022-01-11 | End: 2022-01-12

## 2022-01-11 RX ORDER — SODIUM CITRATE AND CITRIC ACID MONOHYDRATE 334; 500 MG/5ML; MG/5ML
30 SOLUTION ORAL ONCE
Status: CANCELLED | OUTPATIENT
Start: 2022-01-11 | End: 2022-01-11

## 2022-01-11 RX ORDER — CALCIUM CARBONATE 200(500)MG
500 TABLET,CHEWABLE ORAL 3 TIMES DAILY PRN
Status: DISCONTINUED | OUTPATIENT
Start: 2022-01-11 | End: 2022-01-12

## 2022-01-11 RX ORDER — ONDANSETRON 8 MG/1
8 TABLET, ORALLY DISINTEGRATING ORAL EVERY 8 HOURS PRN
Status: DISCONTINUED | OUTPATIENT
Start: 2022-01-11 | End: 2022-01-12

## 2022-01-11 RX ORDER — OXYTOCIN/RINGER'S LACTATE 30/500 ML
95 PLASTIC BAG, INJECTION (ML) INTRAVENOUS ONCE
Status: DISCONTINUED | OUTPATIENT
Start: 2022-01-11 | End: 2022-01-12

## 2022-01-11 RX ORDER — FENTANYL/ROPIVACAINE/NS/PF 2MCG/ML-.2
PLASTIC BAG, INJECTION (ML) INJECTION CONTINUOUS
Status: DISCONTINUED | OUTPATIENT
Start: 2022-01-11 | End: 2022-01-12

## 2022-01-11 RX ADMIN — Medication 12 ML/HR: at 11:01

## 2022-01-11 RX ADMIN — SODIUM CHLORIDE, SODIUM LACTATE, POTASSIUM CHLORIDE, AND CALCIUM CHLORIDE: .6; .31; .03; .02 INJECTION, SOLUTION INTRAVENOUS at 09:01

## 2022-01-12 PROBLEM — Z37.9 NORMAL LABOR: Status: ACTIVE | Noted: 2022-01-12

## 2022-01-12 PROCEDURE — 59409 PR OBSTETRICAL CARE,VAG DELIV ONLY: ICD-10-PCS | Mod: GB,,, | Performed by: OBSTETRICS & GYNECOLOGY

## 2022-01-12 PROCEDURE — 25000003 PHARM REV CODE 250: Performed by: STUDENT IN AN ORGANIZED HEALTH CARE EDUCATION/TRAINING PROGRAM

## 2022-01-12 PROCEDURE — 63600175 PHARM REV CODE 636 W HCPCS: Performed by: OBSTETRICS & GYNECOLOGY

## 2022-01-12 PROCEDURE — 72200005 HC VAGINAL DELIVERY LEVEL II

## 2022-01-12 PROCEDURE — 25000003 PHARM REV CODE 250: Performed by: OBSTETRICS & GYNECOLOGY

## 2022-01-12 PROCEDURE — 59409 OBSTETRICAL CARE: CPT | Mod: GB,,, | Performed by: OBSTETRICS & GYNECOLOGY

## 2022-01-12 PROCEDURE — 27000181 HC CABLE, IUPC

## 2022-01-12 PROCEDURE — 11000001 HC ACUTE MED/SURG PRIVATE ROOM

## 2022-01-12 PROCEDURE — 63600175 PHARM REV CODE 636 W HCPCS

## 2022-01-12 RX ORDER — PROCHLORPERAZINE EDISYLATE 5 MG/ML
5 INJECTION INTRAMUSCULAR; INTRAVENOUS EVERY 6 HOURS PRN
Status: DISCONTINUED | OUTPATIENT
Start: 2022-01-12 | End: 2022-01-14 | Stop reason: HOSPADM

## 2022-01-12 RX ORDER — PRENATAL WITH FERROUS FUM AND FOLIC ACID 3080; 920; 120; 400; 22; 1.84; 3; 20; 10; 1; 12; 200; 27; 25; 2 [IU]/1; [IU]/1; MG/1; [IU]/1; MG/1; MG/1; MG/1; MG/1; MG/1; MG/1; UG/1; MG/1; MG/1; MG/1; MG/1
1 TABLET ORAL DAILY
Status: DISCONTINUED | OUTPATIENT
Start: 2022-01-12 | End: 2022-01-14 | Stop reason: HOSPADM

## 2022-01-12 RX ORDER — IBUPROFEN 600 MG/1
600 TABLET ORAL EVERY 6 HOURS PRN
Status: DISCONTINUED | OUTPATIENT
Start: 2022-01-12 | End: 2022-01-14 | Stop reason: HOSPADM

## 2022-01-12 RX ORDER — ALBUTEROL SULFATE 90 UG/1
2 AEROSOL, METERED RESPIRATORY (INHALATION) EVERY 6 HOURS PRN
Status: DISCONTINUED | OUTPATIENT
Start: 2022-01-12 | End: 2022-01-14 | Stop reason: HOSPADM

## 2022-01-12 RX ORDER — DIPHENHYDRAMINE HYDROCHLORIDE 50 MG/ML
25 INJECTION INTRAMUSCULAR; INTRAVENOUS EVERY 4 HOURS PRN
Status: DISCONTINUED | OUTPATIENT
Start: 2022-01-12 | End: 2022-01-14 | Stop reason: HOSPADM

## 2022-01-12 RX ORDER — ACETAMINOPHEN 325 MG/1
650 TABLET ORAL EVERY 6 HOURS PRN
Status: DISCONTINUED | OUTPATIENT
Start: 2022-01-12 | End: 2022-01-14 | Stop reason: HOSPADM

## 2022-01-12 RX ORDER — HYDROCORTISONE 25 MG/G
CREAM TOPICAL 3 TIMES DAILY PRN
Status: DISCONTINUED | OUTPATIENT
Start: 2022-01-12 | End: 2022-01-14 | Stop reason: HOSPADM

## 2022-01-12 RX ORDER — METHYLERGONOVINE MALEATE 0.2 MG/ML
200 INJECTION INTRAVENOUS
Status: DISCONTINUED | OUTPATIENT
Start: 2022-01-12 | End: 2022-01-14 | Stop reason: HOSPADM

## 2022-01-12 RX ORDER — SODIUM CHLORIDE 0.9 % (FLUSH) 0.9 %
10 SYRINGE (ML) INJECTION
Status: DISCONTINUED | OUTPATIENT
Start: 2022-01-12 | End: 2022-01-14 | Stop reason: HOSPADM

## 2022-01-12 RX ORDER — ONDANSETRON 8 MG/1
8 TABLET, ORALLY DISINTEGRATING ORAL EVERY 8 HOURS PRN
Status: DISCONTINUED | OUTPATIENT
Start: 2022-01-12 | End: 2022-01-14 | Stop reason: HOSPADM

## 2022-01-12 RX ORDER — DOCUSATE SODIUM 100 MG/1
200 CAPSULE, LIQUID FILLED ORAL 2 TIMES DAILY PRN
Status: DISCONTINUED | OUTPATIENT
Start: 2022-01-12 | End: 2022-01-14 | Stop reason: HOSPADM

## 2022-01-12 RX ORDER — SIMETHICONE 80 MG
1 TABLET,CHEWABLE ORAL EVERY 6 HOURS PRN
Status: DISCONTINUED | OUTPATIENT
Start: 2022-01-12 | End: 2022-01-14 | Stop reason: HOSPADM

## 2022-01-12 RX ORDER — METHYLERGONOVINE MALEATE 0.2 MG/ML
INJECTION INTRAVENOUS
Status: COMPLETED
Start: 2022-01-12 | End: 2022-01-12

## 2022-01-12 RX ORDER — HYDROCODONE BITARTRATE AND ACETAMINOPHEN 10; 325 MG/1; MG/1
1 TABLET ORAL EVERY 4 HOURS PRN
Status: DISCONTINUED | OUTPATIENT
Start: 2022-01-12 | End: 2022-01-14 | Stop reason: HOSPADM

## 2022-01-12 RX ORDER — HYDROCODONE BITARTRATE AND ACETAMINOPHEN 5; 325 MG/1; MG/1
1 TABLET ORAL EVERY 4 HOURS PRN
Status: DISCONTINUED | OUTPATIENT
Start: 2022-01-12 | End: 2022-01-14 | Stop reason: HOSPADM

## 2022-01-12 RX ORDER — DIPHENHYDRAMINE HCL 25 MG
25 CAPSULE ORAL EVERY 4 HOURS PRN
Status: DISCONTINUED | OUTPATIENT
Start: 2022-01-12 | End: 2022-01-14 | Stop reason: HOSPADM

## 2022-01-12 RX ORDER — OXYTOCIN/RINGER'S LACTATE 30/500 ML
95 PLASTIC BAG, INJECTION (ML) INTRAVENOUS ONCE
Status: DISCONTINUED | OUTPATIENT
Start: 2022-01-12 | End: 2022-01-12

## 2022-01-12 RX ADMIN — IBUPROFEN 600 MG: 600 TABLET, FILM COATED ORAL at 08:01

## 2022-01-12 RX ADMIN — Medication 2 MILLI-UNITS/MIN: at 12:01

## 2022-01-12 RX ADMIN — METHYLERGONOVINE MALEATE 200 MCG: 0.2 INJECTION, SOLUTION INTRAMUSCULAR; INTRAVENOUS at 11:01

## 2022-01-12 RX ADMIN — ACETAMINOPHEN 650 MG: 325 TABLET ORAL at 01:01

## 2022-01-12 RX ADMIN — Medication 12 ML/HR: at 06:01

## 2022-01-12 NOTE — LACTATION NOTE
Rounded on mother. Infant currently in NICU. Plans to BR. Discussed importance of early and frequent breast stimulation for milk supply.    [employer] Symphony pump, tubing, and collection containers  brought to bedside.  Discussed proper pump setting of initiation phase.  Instructed on proper usage of pump and to adjust suction according to maximum comfort level.  Verified appropriate flange fit.  Educated on the frequency and duration of pumping in order to promote and maintain a full milk supply.  Hands on pumping technique reviewed.  Encouraged hand expression after pumping.  Instructed on cleaning of breast pump parts.  Written instructions also given.  Pt verbalized understanding and appropriate recall for proper milk handling, collection, labeling, storage and transportation.

## 2022-01-12 NOTE — PLAN OF CARE
Problem: Adult Inpatient Plan of Care  Goal: Plan of Care Review  Outcome: Ongoing, Progressing  Goal: Patient-Specific Goal (Individualized)  Outcome: Ongoing, Progressing  Goal: Absence of Hospital-Acquired Illness or Injury  Outcome: Ongoing, Progressing  Goal: Optimal Comfort and Wellbeing  Outcome: Ongoing, Progressing  Goal: Readiness for Transition of Care  Outcome: Ongoing, Progressing     Problem:  Fall Injury Risk  Goal: Absence of Fall, Infant Drop and Related Injury  Outcome: Ongoing, Progressing     Problem: Infection  Goal: Absence of Infection Signs and Symptoms  Outcome: Ongoing, Progressing     Problem: Breastfeeding  Goal: Effective Breastfeeding  Outcome: Ongoing, Progressing     Problem: Bleeding (Labor)  Goal: Hemostasis  Outcome: Ongoing, Progressing     Problem: Change in Fetal Wellbeing (Labor)  Goal: Stable Fetal Wellbeing  Outcome: Ongoing, Progressing     Problem: Delayed Labor Progression (Labor)  Goal: Effective Progression to Delivery  Outcome: Ongoing, Progressing     Problem: Infection (Labor)  Goal: Absence of Infection Signs and Symptoms  Outcome: Ongoing, Progressing     Problem: Labor Pain (Labor)  Goal: Acceptable Pain Control  Outcome: Ongoing, Progressing     Problem: Uterine Tachysystole (Labor)  Goal: Normal Uterine Contraction Pattern  Outcome: Ongoing, Progressing

## 2022-01-12 NOTE — NURSING
Dr valerio nototified of pt arrival and status. 5-6cm dil w/ bbow; baby tachycardic, not reactive at present. Will iv hydrate and monitor; hold cervidil for now.

## 2022-01-12 NOTE — ANESTHESIA PREPROCEDURE EVALUATION
2022  Christianne Faria is a 31 y.o., female  40w1d with PMH of LUAREN and mild asthma presented in labor.    Platlets 234. Hemoglobin 11.4.    Past Medical History:   Diagnosis Date    Anxiety     Depression     Hx of psychiatric care     Mild intermittent asthma     Obsessive-compulsive disorder      Past Surgical History:   Procedure Laterality Date    BUNIONECTOMY Right 2009       Review of patient's allergies indicates:  No Known Allergies   Recent Labs   Lab 22  2152   WBC 14.51*   RBC 3.99*   HGB 11.4*   HCT 34.4*      MCV 86   MCH 28.6   MCHC 33.1       Anesthesia Evaluation    I have reviewed the Patient Summary Reports.    I have reviewed the Nursing Notes. I have reviewed the NPO Status.      Review of Systems  Anesthesia Hx:  No problems with previous Anesthesia  Denies Family Hx of Anesthesia complications.   Denies Personal Hx of Anesthesia complications.   Social:  Non-Smoker, No Alcohol Use    Hematology/Oncology:     Oncology Normal    -- Anemia:   EENT/Dental:EENT/Dental Normal   Cardiovascular:  Cardiovascular Normal Exercise tolerance: good     Pulmonary:  Pulmonary Normal    Renal/:  Renal/ Normal     Hepatic/GI:   GERD    Musculoskeletal:  Musculoskeletal Normal    Neurological:  Neurology Normal    Dermatological:  Skin Normal    Psych:   Psychiatric History anxiety depression          Physical Exam  General:  Obesity    Airway/Jaw/Neck:  Airway Findings: Mouth Opening: Normal Tongue: Normal  General Airway Assessment: Adult  Mallampati: II       Chest/Lungs:  Chest/Lungs Findings: Clear to auscultation, Normal Respiratory Rate     Heart/Vascular:  Heart Findings: Rate: Normal  Rhythm: Regular Rhythm        Mental Status:  Mental Status Findings:  Cooperative, Alert and Oriented         Anesthesia Plan  Type of Anesthesia, risks & benefits  discussed:  Anesthesia Type:  CSE, epidural, general, spinal    Patient's Preference:   Plan Factors:          Intra-op Monitoring Plan: standard ASA monitors  Intra-op Monitoring Plan Comments:   Post Op Pain Control Plan: multimodal analgesia  Post Op Pain Control Plan Comments:     Induction:    Beta Blocker:  Patient is not currently on a Beta-Blocker (No further documentation required).       Informed Consent: Patient understands risks and agrees with Anesthesia plan.  Questions answered. Anesthesia consent signed with patient.  ASA Score: 2     Day of Surgery Review of History & Physical:  There are no significant changes.          Ready For Surgery From Anesthesia Perspective.

## 2022-01-12 NOTE — ANESTHESIA PROCEDURE NOTES
CSE    Patient location during procedure: OB  Start time: 1/11/2022 10:40 PM  Timeout: 1/11/2022 10:40 PM  End time: 1/11/2022 11:04 PM    Reason for block: labor analgesia requested by patient and obstetrician    Staffing  Authorizing Provider: Mary Sparks MD  Performing Provider: Ben Landry MD    Preanesthetic Checklist  Completed: patient identified, IV checked, site marked, risks and benefits discussed, surgical consent, monitors and equipment checked, pre-op evaluation and timeout performed  CSE  Patient position: sitting  Prep: ChloraPrep  Patient monitoring: heart rate, continuous pulse ox and frequent blood pressure checks  Approach: midline  Spinal Needle  Needle type: pencil-tip   Needle gauge: 25 G  Needle length: 4 in  Epidural Needle  Injection technique: SOO saline  Needle type: Tuohy   Needle gauge: 17 G  Needle length: 3.5 in  Needle insertion depth: 8.5 cm  Location: L3-4  Needle localization: anatomical landmarks  Catheter  Catheter type: springwAdama Materials  Catheter size: 18 G  Catheter at skin depth: 13.5 cm  Test dose: lidocaine 1.5% with Epi 1-to-200,000  Test dose: 3 mL  Additional Documentation: incremental injection, negative aspiration for CSF, negative aspiration for heme, no paresthesia on injection and negative test dose  Assessment  Sensory level: T10   Dermatomal levels determined by ice

## 2022-01-12 NOTE — PLAN OF CARE
Pt states pain goal met with prescribed medications per MD. Lochia light. Vitals stable. Afebrile. Infant remains in NICU, but family aware of visiting policy of NICU and have been able to visit. Family support noted at bedside.

## 2022-01-12 NOTE — L&D DELIVERY NOTE
[facility] - Labor [facility] Delivery  Vaginal Delivery   Operative Note    SUMMARY     Normal spontaneous vaginal delivery of live infant, skin to skin was unable to be performed due to need for NICU evaluation 2/2 meconium.  Infant delivered position OA over intact perineum.  Nuchal cord: No. Body cord noted- delivered through    Spontaneous delivery of placenta and IV pitocin given noting uterine atony with bleeding. Methergine x1 dose given  2nd degree laceration noted and repaired in normal fashion with 2-0 and 3-0 Chromic.  Patient tolerated delivery well. Sponge needle and lap counted correctly x2.    Indications: Normal labor  Pregnancy complicated by:   Patient Active Problem List   Diagnosis    Anxiety    Gastroesophageal reflux disease    Lesion of mandible    BMI 33.0-33.9,adult    Recurrent major depressive disorder, in partial remission    Normocytic anemia    Iron deficiency anemia    Family history of breast cancer in mother    Vitamin D deficiency    Cervical radiculopathy    Vitreous hemorrhage, right    Valsalva retinopathy    Pyelectasis of fetus on prenatal ultrasound    Encounter for supervision of normal first pregnancy in third trimester    Normal labor     Admitting GA: 40w2d    Delivery Information for Vladimir Faria    Birth information:  YOB: 2021   Time of birth: 11:51 AM   Sex: female   Head Delivery Date/Time:     Delivery type:    Gestational Age: 40w2d    Delivery Providers    Delivering clinician:            Measurements    Weight:   Length:          Apgars    Living status:   Apgars:  1 min.:  5 min.:  10 min.:  15 min.:  20 min.:    Skin color:         Heart rate:         Reflex irritability:         Muscle tone:         Respiratory effort:         Total:                                Interventions/Resuscitation         Cord    No data filed       Placenta    Placenta delivery date/time:   Placenta removal:            Labor Events:       labor: No      Labor Onset Date/Time:         Dilation Complete Date/Time:         Start Pushing Date/Time: 2022 11:19       Start Pushing Date/Time: 2022 11:19     Rupture Date/Time: 22  0650         Rupture type:          Fluid Amount:       Fluid Color:       Fluid Odor:       Membrane Status: ARM (Artificial Rupture)               steroids: None     Antibiotics given for GBS:       Induction:       Indications for induction:  Elective     Augmentation: oxytocin     Indications for augmentation:       Labor complications: None     Additional complications:          Cervical ripening:                     Delivery:      Episiotomy:       Indication for Episiotomy:       Perineal Lacerations:   Repaired:      Periurethral Laceration:   Repaired:     Labial Laceration:   Repaired:     Sulcus Laceration:   Repaired:     Vaginal Laceration:   Repaired:     Cervical Laceration:   Repaired:     Repair suture:       Repair # of packets:       Last Value - EBL - Nursing (mL):       Sum - EBL - Nursing (mL): 0     Last Value - EBL - Anesthesia (mL):      Calculated QBL (mL):       Vaginal Sweep Performed: Yes     Surgicount Correct: Yes       Other providers:            Details (if applicable):  Trial of Labor      Categorization:      Priority:     Indications for :     Incision Type:       Additional  information:  Forceps:    Vacuum:    Breech:    Observed anomalies    Other (Comments):             Katharina Quick MD, FACOG  OB/GYN

## 2022-01-12 NOTE — PROGRESS NOTES
"S: 31 y.o.  at 40w2d, comfortable with epidural but feels pelvic pressure intermittently    O:  BP (!) 116/57   Pulse 99   Temp 98.7 °F (37.1 °C) (Oral)   Resp 18   Ht 5' 5" (1.651 m)   Wt 100.7 kg (222 lb)   LMP 04/10/2021 (Exact Date)   SpO2 98%   Breastfeeding No   BMI 36.94 kg/m²     FHT: 150, mod BTBV, + accels, + variable  [place] [employer] [place]: ctx q 2 min  SVE:complete  and -1 to 0 station.       ASSESSMENT: 40w2d   Patient Active Problem List   Diagnosis    Anxiety    Gastroesophageal reflux disease    Lesion of mandible    BMI 33.0-33.9,adult    Recurrent major depressive disorder, in partial remission    Normocytic anemia    Iron deficiency anemia    Family history of breast cancer in mother    Vitamin D deficiency    Cervical radiculopathy    Vitreous hemorrhage, right    Valsalva retinopathy    Pyelectasis of fetus on prenatal ultrasound    Encounter for supervision of normal first pregnancy in third trimester    Normal labor       PLAN:    -Continue Close Maternal/Fetal Monitoring  - Will start pushing in 30min--> 1hr    Katharina Quick MD, FACOG  OB/GYN        "

## 2022-01-13 LAB
ABO + RH BLD: NORMAL
BASOPHILS # BLD AUTO: 0.06 K/UL (ref 0–0.2)
BASOPHILS NFR BLD: 0.3 % (ref 0–1.9)
BLD GP AB SCN CELLS X3 SERPL QL: NORMAL
DIFFERENTIAL METHOD: ABNORMAL
EOSINOPHIL # BLD AUTO: 0.1 K/UL (ref 0–0.5)
EOSINOPHIL NFR BLD: 0.7 % (ref 0–8)
ERYTHROCYTE [DISTWIDTH] IN BLOOD BY AUTOMATED COUNT: 13.4 % (ref 11.5–14.5)
FETAL CELL SCN BLD QL ROSETTE: NORMAL
HCT VFR BLD AUTO: 34.4 % (ref 37–48.5)
HGB BLD-MCNC: 11.3 G/DL (ref 12–16)
IMM GRANULOCYTES # BLD AUTO: 0.15 K/UL (ref 0–0.04)
IMM GRANULOCYTES NFR BLD AUTO: 0.8 % (ref 0–0.5)
LYMPHOCYTES # BLD AUTO: 2.6 K/UL (ref 1–4.8)
LYMPHOCYTES NFR BLD: 13.1 % (ref 18–48)
MCH RBC QN AUTO: 28.4 PG (ref 27–31)
MCHC RBC AUTO-ENTMCNC: 32.8 G/DL (ref 32–36)
MCV RBC AUTO: 86 FL (ref 82–98)
MONOCYTES # BLD AUTO: 1.8 K/UL (ref 0.3–1)
MONOCYTES NFR BLD: 9 % (ref 4–15)
NEUTROPHILS # BLD AUTO: 14.9 K/UL (ref 1.8–7.7)
NEUTROPHILS NFR BLD: 76.1 % (ref 38–73)
NRBC BLD-RTO: 0 /100 WBC
PLATELET # BLD AUTO: 241 K/UL (ref 150–450)
PMV BLD AUTO: 10.6 FL (ref 9.2–12.9)
RBC # BLD AUTO: 3.98 M/UL (ref 4–5.4)
WBC # BLD AUTO: 19.57 K/UL (ref 3.9–12.7)

## 2022-01-13 PROCEDURE — 36415 COLL VENOUS BLD VENIPUNCTURE: CPT | Performed by: OBSTETRICS & GYNECOLOGY

## 2022-01-13 PROCEDURE — 63600175 PHARM REV CODE 636 W HCPCS: Performed by: OBSTETRICS & GYNECOLOGY

## 2022-01-13 PROCEDURE — 90710 MMRV VACCINE SC: CPT | Performed by: OBSTETRICS & GYNECOLOGY

## 2022-01-13 PROCEDURE — 85025 COMPLETE CBC W/AUTO DIFF WBC: CPT | Performed by: OBSTETRICS & GYNECOLOGY

## 2022-01-13 PROCEDURE — 11000001 HC ACUTE MED/SURG PRIVATE ROOM

## 2022-01-13 PROCEDURE — 85461 HEMOGLOBIN FETAL: CPT | Performed by: OBSTETRICS & GYNECOLOGY

## 2022-01-13 PROCEDURE — 90471 IMMUNIZATION ADMIN: CPT | Performed by: OBSTETRICS & GYNECOLOGY

## 2022-01-13 PROCEDURE — 27000181 HC CABLE, IUPC

## 2022-01-13 PROCEDURE — 25000003 PHARM REV CODE 250: Performed by: OBSTETRICS & GYNECOLOGY

## 2022-01-13 PROCEDURE — 86901 BLOOD TYPING SEROLOGIC RH(D): CPT | Performed by: OBSTETRICS & GYNECOLOGY

## 2022-01-13 RX ORDER — IBUPROFEN 600 MG/1
600 TABLET ORAL EVERY 6 HOURS PRN
Qty: 30 TABLET | Refills: 0 | Status: SHIPPED | OUTPATIENT
Start: 2022-01-13 | End: 2022-04-22

## 2022-01-13 RX ADMIN — DOCUSATE SODIUM 200 MG: 100 CAPSULE ORAL at 04:01

## 2022-01-13 RX ADMIN — DOCUSATE SODIUM 200 MG: 100 CAPSULE ORAL at 06:01

## 2022-01-13 RX ADMIN — PRENATAL VIT W/ FE FUMARATE-FA TAB 27-0.8 MG 1 TABLET: 27-0.8 TAB at 08:01

## 2022-01-13 RX ADMIN — IBUPROFEN 600 MG: 600 TABLET, FILM COATED ORAL at 11:01

## 2022-01-13 RX ADMIN — IBUPROFEN 600 MG: 600 TABLET, FILM COATED ORAL at 04:01

## 2022-01-13 RX ADMIN — IBUPROFEN 600 MG: 600 TABLET, FILM COATED ORAL at 08:01

## 2022-01-13 RX ADMIN — IBUPROFEN 600 MG: 600 TABLET, FILM COATED ORAL at 02:01

## 2022-01-13 RX ADMIN — MEASLES, MUMPS, AND RUBELLA VIRUS VACCINE LIVE 0.5 ML: 1000; 12500; 1000 INJECTION, POWDER, LYOPHILIZED, FOR SUSPENSION SUBCUTANEOUS at 01:01

## 2022-01-13 NOTE — PLAN OF CARE
Plan of care reviewed with pt this am.  VSS. Pt voiding and passing flatus without difficulty.  Ambulating well.  Tolerating regular diet.  Reports pain relieved with ordered pain meds administered.   Questions encouraged and answered.  Bonding appropriately with infant.  Breastfeeding and breast pumping support given.  Encouraged to pump 8 or more times in 24 hour period and on demand feedings.  Verbalized understanding.  Visits infant periodically in NICU.

## 2022-01-13 NOTE — ANESTHESIA POSTPROCEDURE EVALUATION
Anesthesia Post Evaluation    Patient: Christianne Faria    Procedure(s) Performed: * No procedures listed *    Final Anesthesia Type: CSE      Patient location: mother baby unit.  Patient participation: Yes- Able to Participate  Level of consciousness: awake and alert and oriented  Post-procedure vital signs: reviewed and stable  Pain management: adequate  Airway patency: patent    PONV status at discharge: No PONV  Anesthetic complications: no      Cardiovascular status: hemodynamically stable  Respiratory status: room air and spontaneous ventilation  Hydration status: euvolemic  Follow-up not needed.          Vitals Value Taken Time   /62 01/13/22 0430   Temp 36.7 °C (98 °F) 01/13/22 0430   Pulse 84 01/13/22 0430   Resp 18 01/13/22 0430   SpO2 99 % 01/12/22 1642         No case tracking events are documented in the log.      Pain/Jordan Score: Pain Rating Prior to Med Admin: 1 (1/13/2022  2:57 AM)      No catheter in back  No headache/neckache/backache  Full return of neurological function  Able to urinate  Advised patient to report any new problems of back pain, especially with fever or decreasing bladder function occurring during coming days to weeks

## 2022-01-13 NOTE — NURSING
0840am=  Awake, sitting up in bed, resp even and unlabored.  POC reviewed with pt.  Questions encouraged.  MMR VIS written information given and explained to pt.  Benefits and risks discussed, verbalized understanding, and requesting MMR vaccine.  Also discussed importance and purpose of Rhogam injection,  and pt verbalized understanding. Safety maintained with call light in reach.

## 2022-01-13 NOTE — PROGRESS NOTES
[facility]  Obstetrics  Postpartum Progress Note    Patient Name: Christianne Faria  MRN: [id number]  Admission Date: 1/11/2022  Hospital Length of Stay: 2 days  Attending Physician: Katharina Quick MD  Primary Care Provider: Endy Draper DO    Subjective:     Principal Problem:Normal labor    Hospital course:   Christianne Faria is a 31 y.o. female PPD #1 status post Spontaneous vaginal delivery. Patient admitted for labor.    Interval History:   She is doing well this morning. She is tolerating a regular diet without nausea or vomiting. She is voiding spontaneously. She is ambulating. . Vaginal bleeding is mild. She denies fever or chills. Abdominal pain is mild and controlled with oral medications. She Is breastfeeding-pumping.      Objective:     Vital Signs (Most Recent):  Temp: 98 °F (36.7 °C) (01/13/22 0430)  Pulse: 84 (01/13/22 0430)  Resp: 18 (01/13/22 0430)  BP: 105/62 (01/13/22 0430)  SpO2: 99 % (01/12/22 1640) Vital Signs (24h Range):  Temp:  [97.5 °F (36.4 °C)-99 °F (37.2 °C)] 98 °F (36.7 °C)  Pulse:  [] 84  Resp:  [16-20] 18  SpO2:  [97 %-100 %] 99 %  BP: (101-126)/(59-77) 105/62     Weight: 100.7 kg (222 lb)  Body mass index is 36.94 kg/m².      Intake/Output Summary (Last 24 hours) at 1/13/2022 0640  Last data filed at 1/12/2022 1400  Gross per 24 hour   Intake --   Output 1432 ml   Net -1432 ml       Physical Exam:   Constitutional: She is oriented to person, place, and time. She appears well-developed and well-nourished. No distress.    HENT:   Head: Normocephalic and atraumatic.       Pulmonary/Chest: She has no wheezes. She has no rales.        Abdominal: Soft.             Musculoskeletal: Moves all extremeties. No edema.       Neurological: She is alert and oriented to person, place, and time.    Skin: Skin is warm and dry.    Psychiatric: She has a normal mood and affect.       Significant Labs:  Lab Results   Component Value Date    GROUPTRH O NEG 01/11/2022    HEPBSAG Negative  2021    STREPBCULT No Group B Streptococcus isolated 12/15/2021     Recent Labs   Lab 22  0545   HGB 11.3*   HCT 34.4*       I have personallly reviewed all pertinent lab results from the last 24 hours.    Assessment/Plan:     31 y.o. female  at 40w2d for:    Active Diagnoses:    Diagnosis Date Noted POA    PRINCIPAL PROBLEM:  Normal labor [O80, Z37.9] 2022 Not Applicable      Problems Resolved During this Admission:       1. Postpartum care:  - Patient doing well. Continue routine management and advances.  - Continue PO pain meds. Pain well controlled.  - Encourage ambulation  - Contraception- progesterone only pills  - Lactation - consult as needed  - Rh negative- rhogam as indicated  - MMR prior to discharge- RNI      Disposition: As patient meets milestones, will plan to discharge PPD#2. Rx ibuprofen sent to pharmacy    Katharina Quick MD  Obstetrics  [facility]

## 2022-01-14 VITALS
OXYGEN SATURATION: 100 % | BODY MASS INDEX: 36.99 KG/M2 | RESPIRATION RATE: 18 BRPM | WEIGHT: 222 LBS | TEMPERATURE: 98 F | SYSTOLIC BLOOD PRESSURE: 102 MMHG | HEIGHT: 65 IN | HEART RATE: 89 BPM | DIASTOLIC BLOOD PRESSURE: 68 MMHG

## 2022-01-14 PROBLEM — Z37.9 NORMAL LABOR: Status: RESOLVED | Noted: 2022-01-12 | Resolved: 2022-01-14

## 2022-01-14 LAB — INJECT RH IG VOL PATIENT: NORMAL ML

## 2022-01-14 PROCEDURE — 63600519 RHOGAM PHARM REV CODE 636 ALT 250 W HCPCS: Performed by: OBSTETRICS & GYNECOLOGY

## 2022-01-14 PROCEDURE — 25000003 PHARM REV CODE 250: Performed by: OBSTETRICS & GYNECOLOGY

## 2022-01-14 PROCEDURE — 99900035 HC TECH TIME PER 15 MIN (STAT)

## 2022-01-14 PROCEDURE — 99238 PR HOSPITAL DISCHARGE DAY,<30 MIN: ICD-10-PCS | Mod: ,,, | Performed by: STUDENT IN AN ORGANIZED HEALTH CARE EDUCATION/TRAINING PROGRAM

## 2022-01-14 PROCEDURE — 99238 HOSP IP/OBS DSCHRG MGMT 30/<: CPT | Mod: ,,, | Performed by: STUDENT IN AN ORGANIZED HEALTH CARE EDUCATION/TRAINING PROGRAM

## 2022-01-14 PROCEDURE — 94761 N-INVAS EAR/PLS OXIMETRY MLT: CPT

## 2022-01-14 RX ADMIN — HUMAN RHO(D) IMMUNE GLOBULIN 300 MCG: 300 INJECTION, SOLUTION INTRAMUSCULAR at 12:01

## 2022-01-14 RX ADMIN — IBUPROFEN 600 MG: 600 TABLET, FILM COATED ORAL at 09:01

## 2022-01-14 RX ADMIN — PRENATAL VIT W/ FE FUMARATE-FA TAB 27-0.8 MG 1 TABLET: 27-0.8 TAB at 09:01

## 2022-01-14 NOTE — DISCHARGE INSTRUCTIONS
"Patient Discharge Instructions for Postpartum Women    Resume Regular Diet  Increase activity gradually, no heavy lifting  Shower  No tampons, douching or sexual intercourse.  Discuss birth control options with your physician.  Wear a support bra  Return to work/school when you've been cleared by a physician    Call your physician if     *Fever of 100.4 or higher  *Persistent nausea/ vomiting  *Incisional drainage  *Heavy vaginal bleeding or large clots (Heavy bleeding is soaking 1 pad in an hour)  *Swelling and pain in arms or legs  *Severe headaches, blurred vision or fainting  *Shortness of breath  *Frequency and burning with urination  *Signs of postpartum depression, discuss these signs with your physician    Call lactation services for questions regarding feeding, nipple and breast care, and general questions about lactation.  They can be reached at [phone]         Understanding Postpartum Depression    You've just had a baby.  You know you should be excited and happy.  But instead you find yourself crying for no reason.  You may have trouble coping with your daily tasks.  You feel sad, tired, and hopeless most of the time.  You may even feel ashamed or guilty.  But what you're going through is not your fault and you can feel better.  Talk to your doctor.  He or she can help.    Depression After Childbirth    You may be weepy and tired right after giving birth.  These feelings are normal.  They're sometimes called the "baby blues."  These blues go away 2-3 weeks.  However, postpartum (meaning "after birth") depression lasts much longer and is more sever than the "baby blues."  It can make you feel sad and hopeless.  You may also fear that your baby will be harmed and worry about being a bad mother.      What is Depression?    Depression is a mood disorder that affects the way you think and feel.  The most common symptom is a feeling of deep sadness.  You may also feel as if you just can't cope with life.  "   Other symptoms include:      * Gaining or loosing weight  * Sleeping too much or too little  * Feeling tired all the time  * Feeling restless  * Fears of harming your baby   * Lack of interest in your baby  * Feeling worthless or guilty  * No longer finding pleasure in things you used to  * Having trouble thinking clearly or making decisions  * Thoughts of hurting yourself or your baby    What Causes Postpartum Depression    The exact causes of postpartum depression isn't known.  It may be due to changes in your hormones during and after childbirth.  You may also be tired from caring for your baby and adjusting to being a mother.  All these factors may make you feel depressed.  In some cases, your genes may also play a role.    Depression Can Be Treated    The good news is that there are many ways to treat postpartum depression.  Talking to your doctor is the first step toward feeling better.    Resources:    * National [place] of Mental Health  -- 584.580.6715    www.nimh.nih.gov    * National [place] on Mental Illness --368.484.9445    Www.brandi.org    * Mental Health [place] -- 109.795.7894     Www.[facility].org    * National Suicide Hotline --273.355.4979 (800-SUICIDE)    1884-8073 [employer]  All rights reserved.  This information is not intended as a substitute for professional medical care.  Always follow up with your healthcare professional's instructions.            Breastfeeding Discharge Instructions       Feed the baby at the earliest sign of hunger or comfort  o Hands to mouth, sucking motions  o Rooting or searching for something to suck on  o Dont wait for crying - it is a sign of distress     The feedings may be 8-12 times per 24hrs and will not follow a schedule   Avoid pacifiers and bottles for the first 4 weeks   Alternate the breast you start the feeding with, or start with the breast that feels the fullest   Switch breasts when the baby takes himself off the breast or falls  asleep   Keep offering breasts until the baby looks full, no longer gives hunger signs, and stays asleep when placed on his back in the crib   If the baby is sleepy and wont wake for a feeding, put the baby skin-to-skin dressed in a diaper against the mothers bare chest   Sleep near your baby   The baby should be positioned and latched on to the breast correctly  o Chest-to-chest, chin in the breast  o Babys lips are flipped outward  o Babys mouth is stretched open wide like a shout  o Babys sucking should feel like tugging to the mother  - The baby should be drinking at the breast:  o You should hear swallowing or gulping throughout the feeding  o You should see milk on the babys lips when he comes off the breast  o Your breasts should be softer when the baby is finished feeding  o The baby should look relaxed at the end of feedings  o After the 4th day and your milk is in:  o The babys poop should turn bright yellow and be loose, watery, and seedy  o The baby should have at least 3-4 poops the size of the palm of your hand per day  o The baby should have at least 5-6 wet diapers per day  o The urine should be light yellow in color  You should drink when you are thirsty and eat a healthy diet when you are    hungry.     Take naps to get the rest you need.   Take medications and/or drink alcohol only with permission of your obstetrician    or the babys pediatrician.  You can also call the Infant Risk Center,   ([phone]), Monday-Friday, 8am-5pm Central time, to get the most   up-to-date evidence-based information on the use of medications during   pregnancy and breastfeeding.      The baby should be examined by a pediatrician at 3-5 days of age.  Once your   milk comes in, the baby should be gaining at least ½ - 1oz each day and should be back to birthweight no later than 10-14 days of age.          Community Resources    [facility] Breastfeeding Warmline: [phone]  Local  WIC clinics: provide incentives and breastpumps to eligible mothers  La Leche League International (LLLI):  mother-to-mother support group website        www.lll.org  [facility] La Leche League mother-to-mother support groups:        www.llrenettaECOtality.[employer]        La Leche League [facility]   Dr. Johan Bravo website for latch videos and general information:        www.breastfeedinginc.ca  [facility] Risk [facility] is a call center that provides information about the safety of taking medications while breastfeeding.  Call 1-988.876.3711, M-F, 8am-5pm, CT.  International Lactation Consultant Association provides resources for assistance:        www.ilca.org  [facility] Breastfeeding Coalition provides informationand resources for parents  and the community    http://[facility].org  Zip code search of breastfeeding resources and more:                                                                              www.LaBreastfeedingSupport.org          Eleni Hastings is a mom-to-mom support group:                             www.C[employer]ction[employer]Watchsend.com//breastfeedng-support/  [facility] for Healthy Babies:  0-613-294-BABY(9564)  [place]: a breastfeeding support group for women of color, 303.275.1584

## 2022-01-14 NOTE — PLAN OF CARE
Note copied from Infant's chart ( MRN: [id number])     SOCIAL WORK DISCHARGE PLANNING ASSESSMENT    Sw completed discharge planning assessment with pt's mother via telephone [phone].  Pt's mother was easily engaged and education on the role of  was provided. Emotional support provided throughout assessment. Pt's mother reported all necessities for patient were obtained, including a car seat. Pt's father Adama Ortiz will provide transportation to family home following discharge. Pt's mother reported she has good family support and family will provide assistance as needed. No needs for community resources were needed at this time. SW encouraged pt's mother to call with any questions or concerns. Pt's mother verbalized understanding.      Legal Name: Elvi Ortiz  :  2021  Address: [address] [place] [address]   Parent's Phone Numbers: [phone] ( Mother- Christianne Faria)  [phone] ( Father- Adama Ortiz )     Pediatrician: Dr. Endy Draper      Potential Eligibility for SSI Benefits: No      Patient Active Problem List   Diagnosis    Term  delivered vaginally, current hospitalization    Meconium aspiration below vocal cords    Transient tachypnea of     Apgar score 2 at 1 minute    Umbilical cord around body tightly noted at delivery         [facility]   CASSIDY: 1/10/2021     Birth Weight: 3.345 kg (7 lb 6 oz)  Birth Length: 51 cm   Gestational Age: 40w2d          Apgars    Living status: Living  Apgars:  1 min.:  5 min.:  10 min.:  15 min.:  20 min.:    Skin color:  0  1  1      Heart rate:  2  2  2      Reflex irritability:  0  1  2      Muscle tone:  0  1  1      Respiratory effort:  0  1  1      Total:  2  6  7      Apgars assigned by: NICU          22 0934   NICU Assessment   Assessment Type Discharge Planning Assessment   Source of Information family  (Christianne Faria [phone] ( Mother))   Verified  Demographic and Insurance Information Yes   Insurance Medicaid   Medicaid [employer]   Medicaid Insurance Primary   Primary Source of Support/Comfort parent  (Christianne Faria [phone] ( Mother))   Mother Employed No   Highest Level of Education Some College   Father's Involvement Fully Involved   Is Father signing the birth certificate Yes   Father Name and  Adama Lunay [phone]   Father's Address [address] [place] [id number]   Family Involvement High   Primary Contact Name and Number Christianne Faria [phone] ( Mother)   Other Contacts Names and Numbers Adama Ortiz [phone] ( Father)   Infant Feeding Plan breastfeeding;formula feeding   Breast Pump Needed no   Does baby have crib or safe sleep space? Yes   Do you have a car seat? Yes   DCFS No indications (Indicators for Report)   Discharge Plan A Home with family

## 2022-01-14 NOTE — PLAN OF CARE
Rounded on pt in nicu. Both parents at baby's bs. Ryann RN requested that I assist with first BR. Assisted with skin to skin, awakening baby, position & latch. Good latch noted in cross-cradle hold with assistance. Stimulation needed to suck. Baby sleepy-sucking off & on with occasional swallows.  Discussed benefits of BR & encouraged to attempt to BR when baby is showing fdg cues. Discussed adequacy of colostrum; supply/demand. Mom stated that she has pumped 7x since set up. Has symphony pump in room. Mom just recently pumped. Dad brought pumping horns into nicu to collect small drops of colostrum on cotton swab-placed on baby's lips. Baby licked lips. Mom will breastfeed as able to do so. Will monitor for signs of deep latch & adequate fdg. Encouraged to pump after this fdg & pump/hand express at least 8+ times/24 hrs for nicu baby until able to BR effectively & consistently. Stressed importance of hand hygiene & keeping pump kit clean. Will collect, label, store & transport EBM as instructed. Lots of praise, encouragement & reassurance provided. Encouraged to call for any questions/needs. Verbalized understanding.

## 2022-01-14 NOTE — PROGRESS NOTES
[place]  Obstetrics  Postpartum Progress Note    Patient Name: Christianne Faria  MRN: [id number]  Admission Date: 1/11/2022  Hospital Length of Stay: 3 days  Attending Physician: Katharina Quick MD  Primary Care Provider: Endy Draper DO    Subjective:     Principal Problem:Normal labor    Hospital course:   Christianne Faria is a 31 y.o. female PPD #2 status post Spontaneous vaginal delivery. Patient admitted for labor.    Interval History:   She is doing well this morning. She is tolerating a regular diet without nausea or vomiting. She is voiding spontaneously. She is ambulating. . Vaginal bleeding is mild. She denies fever or chills. Abdominal pain is mild and controlled with oral medications. She Is breastfeeding-pumping.      Objective:     Vital Signs (Most Recent):  Temp: 98.4 °F (36.9 °C) (01/14/22 0200)  Pulse: 78 (01/14/22 0200)  Resp: 18 (01/14/22 0200)  BP: 105/67 (01/14/22 0200)  SpO2: 100 % (01/13/22 1700) Vital Signs (24h Range):  Temp:  [98 °F (36.7 °C)-98.5 °F (36.9 °C)] 98.4 °F (36.9 °C)  Pulse:  [78-90] 78  Resp:  [16-18] 18  SpO2:  [100 %] 100 %  BP: (104-116)/(59-78) 105/67     Weight: 100.7 kg (222 lb)  Body mass index is 36.94 kg/m².    No intake or output data in the 24 hours ending 01/14/22 0837    Physical Exam:   Constitutional: She is oriented to person, place, and time. She appears well-developed and well-nourished. No distress.    HENT:   Head: Normocephalic and atraumatic.       Pulmonary/Chest: She has no wheezes. She has no rales.        Abdominal: Soft.             Musculoskeletal: Moves all extremeties. No edema.       Neurological: She is alert and oriented to person, place, and time.    Skin: Skin is warm and dry.    Psychiatric: She has a normal mood and affect.       Significant Labs:  Lab Results   Component Value Date    GROUPTRH O NEG 01/13/2022    HEPBSAG Negative 07/07/2021    STREPBCULT No Group B Streptococcus isolated 12/15/2021     Recent Labs   Lab  22  0545   HGB 11.3*   HCT 34.4*       I have personallly reviewed all pertinent lab results from the last 24 hours.    Assessment/Plan:     31 y.o. female  at 40w2d for:    Active Diagnoses:    Diagnosis Date Noted POA     (spontaneous vaginal delivery) [O80] 2022 Not Applicable      Problems Resolved During this Admission:    Diagnosis Date Noted Date Resolved POA    PRINCIPAL PROBLEM:  Normal labor [O80, Z37.9] 2022 Not Applicable       1. Postpartum care:  - Patient doing well. Continue routine management and advances.  - Continue PO pain meds. Pain well controlled.  - Encourage ambulation  - Contraception- progesterone only pills  - Lactation - consult as needed  - Rh negative- rhogam as indicated  - MMR prior to discharge- RNI      Disposition: As patient meets milestones, will plan to discharge PPD#2. Rx ibuprofen sent to pharmacy    Christiano Huerta MD  Obstetrics  [facility]

## 2022-01-14 NOTE — LACTATION NOTE
[place] [facility]  Lactation Note - Mom    SUMMARY     Maternal Assessment    Breast Size Issue: none  Breast Shape: Bilateral:,pendulous  Breast Density: Bilateral:,filling  Areola: Bilateral:,elastic  Nipples: Bilateral:,everted,graspable  Left Nipple Symptoms: other (see comments) (denies pain)  Right Nipple Symptoms: other (see comments) (denies pain)      LATCH Score         Breasts WDL    Breast WDL: WDL,all  Left Breast Symptoms: nontender (filling)  Right Breast Symptoms: nontender (filling)  Left Nipple Symptoms: other (see comments) (denies pain)  Right Nipple Symptoms: other (see comments) (denies pain)    Maternal Infant Feeding    Maternal Preparation: breast care,hand hygiene  Maternal Emotional State: assist needed,relaxed  Infant Positioning: cross-cradle  Signs of Milk Transfer:  (too sleepy to latch)  Pain with Feeding: no  Comfort Measures Before/During Feeding: infant position adjusted  Milk Ejection Reflex: absent  Comfort Measures Following Feeding: air-drying encouraged,expressed milk applied  Nipple Shape After Feeding, Right: round  Latch Assistance: yes    Lactation Referrals         Lactation Interventions    Breast Care: Breastfeeding: breast milk to nipples,manual expression to soften breast,milk massaged towards nipple,open to air  Breastfeeding Assistance: support offered,feeding cue recognition promoted,feeding on demand promoted,feeding session observed,hand expression verified,assisted with positioning,electric breast pump used,infant stimulated to wakeful state,supplemental feeding provided  Breast Care: Breastfeeding: breast milk to nipples,manual expression to soften breast,milk massaged towards nipple,open to air  Breastfeeding Assistance: support offered,feeding cue recognition promoted,feeding on demand promoted,feeding session observed,hand expression verified,assisted with positioning,electric breast pump used,infant stimulated to wakeful state,supplemental feeding  provided  Fetal Wellbeing Promotion: intake and output monitored  Breastfeeding Support: encouragement provided,lactation counseling provided       Breastfeeding Session    Breast Pumping Interventions: post-feed pumping encouraged  Infant Positioning: cross-cradle  Signs of Milk Transfer:  (too sleepy to latch)    Maternal Information    Date of Referral: 22  Person Making Referral: nurse  Infant Reason for Referral:  infant,no latch within 24 hours,other (see comments) (assistance with first BR)

## 2022-01-14 NOTE — PLAN OF CARE
POC reviewed with pt around 0800; verbalized acceptance and understanding.  Pt's VS stable.  Remains free from falls and injury.  Pain controlled with ordered meds.  Bonding well with baby.  Able to visit baby in NICU frequently; using electronic breast pump and bringing milk back/forth for baby.  Family at bedside to offer support.       Discharge instructions given to patient verbally and in writing at 1235. Verbalized understanding. Received Mother-Baby care guide during hospital stay. Prescriptions given with explanation for use. Verbalized understanding. Rhogam given. Says she will have assistance when she returns home.  Baby remains in NICU; told mom that she is welcome to room-in for tonight but cannot guarantee that she can stay more than one night if need be.  Pt trying to decide to stay or go home; will let RN know.  GONSALO.

## 2022-01-14 NOTE — DISCHARGE SUMMARY
Delivery Discharge Summary  Obstetrics      Primary OB Clinician: Katharina Quick MD      Admission date: 2022  Discharge date: 2022    Disposition: To home, self care    Discharge Diagnosis List:      Patient Active Problem List   Diagnosis    Anxiety    Gastroesophageal reflux disease    Lesion of mandible    BMI 33.0-33.9,adult    Recurrent major depressive disorder, in partial remission    Normocytic anemia    Iron deficiency anemia    Family history of breast cancer in mother    Vitamin D deficiency    Cervical radiculopathy    Vitreous hemorrhage, right    Valsalva retinopathy    Pyelectasis of fetus on prenatal ultrasound    Encounter for supervision of normal first pregnancy in third trimester     (spontaneous vaginal delivery)       Procedure:     Hospital Course:  Christianne Faria is a 31 y.o. now , PPD #2 who was admitted on 2022 at 40w2d for IOL. This IUP was complicated by Rh Negative status, RNI, and mild intermittent asthma. Patient was subsequently admitted to labor and delivery unit with signed consents. Labor course was uncomplicated and resulted in  without complications. Please see delivery note for further details. Her postpartum course was uncomplicated. On discharge day, patient's pain is controlled with oral pain medications. Pt is tolerating ambulation without SOB or CP, and regular diet without N/V. Reports lochia is mild. Denies any HA, vision changes, F/C, LE swelling. Denies any breast pain/soreness.    Pt in stable condition and ready for discharge. She has been instructed to start and/or continue medications and follow up with her obstetrics provider as listed below.    Pertinent studies:  CBC  Recent Labs   Lab 22  2152 22  0545   WBC 14.51* 19.57*   HGB 11.4* 11.3*   HCT 34.4* 34.4*   MCV 86 86    241          Immunization History   Administered Date(s) Administered    COVID-19, MRNA, LN-S, PF (MODERNA FULL 0.5 ML DOSE)  03/23/2021, 04/20/2021    DTaP 1991, 01/14/1992, 03/09/1992, 10/29/1992, 09/19/1996    HIB 01/14/1992, 03/09/1992, 09/01/1992, 10/29/1992    Hepatitis B, Pediatric/Adolescent 06/03/1999, 10/25/2006, 01/03/2007    IPV 1991, 01/14/1992, 10/29/1992, 09/19/1996    Influenza - Quadrivalent - PF *Preferred* (6 months and older) 10/16/2020, 12/20/2021    MMR 10/29/1992, 09/19/1996, 01/13/2022    Meningococcal Conjugate (MCV4P) 01/03/2007    Rho (D) Immune Globulin 10/20/2021    Rho (D) Immune Globulin - IM 01/14/2022    Tdap 10/25/2006, 10/20/2021    Varicella 06/03/1999, 02/11/2009        Delivery:    Episiotomy: None   Lacerations: 2nd   Repair suture:     Repair # of packets: 1   Blood loss (ml):       Birth information:  YOB: 2021   Time of birth: 11:51 AM   Sex: female   Delivery type: Vaginal, Spontaneous   Gestational Age: 40w2d    Delivery Clinician:      Other providers:       Additional  information:  Forceps:    Vacuum:    Breech:    Observed anomalies      Living?:           APGARS  One minute Five minutes Ten minutes   Skin color:         Heart rate:         Grimace:         Muscle tone:         Breathing:         Totals: 2  6  7      Placenta: Delivered:       appearance      Patient Instructions:   Current Discharge Medication List      START taking these medications    Details   ibuprofen (ADVIL,MOTRIN) 600 MG tablet Take 1 tablet (600 mg total) by mouth every 6 (six) hours as needed (cramping).  Qty: 30 tablet, Refills: 0         CONTINUE these medications which have NOT CHANGED    Details   albuterol (VENTOLIN HFA) 90 mcg/actuation inhaler Inhale 2 puffs into the lungs every 6 (six) hours as needed for Wheezing. Rescue  Qty: 18 g, Refills: 0    Associated Diagnoses: Mild intermittent asthma without complication      buPROPion (WELLBUTRIN XL) 300 MG 24 hr tablet Take 1 tablet (300 mg total) by mouth once daily.  Qty: 90 tablet, Refills: 1    Associated Diagnoses:  Anxiety; Recurrent major depressive disorder, in partial remission; Panic attack      multivitamin capsule Take 1 capsule by mouth once daily.         STOP taking these medications       ferrous sulfate 324 mg (65 mg iron) TbEC Comments:   Reason for Stopping:               Discharge Procedure Orders   Pelvic Rest     Notify your health care provider if you experience any of the following:     Notify your health care provider if you experience any of the following:  increased confusion or weakness     Notify your health care provider if you experience any of the following:  persistent dizziness, light-headedness, or visual disturbances     Notify your health care provider if you experience any of the following:  worsening rash     Notify your health care provider if you experience any of the following:  severe persistent headache     Notify your health care provider if you experience any of the following:  difficulty breathing or increased cough     Notify your health care provider if you experience any of the following:  redness, tenderness, or signs of infection (pain, swelling, redness, odor or green/yellow discharge around incision site)     Notify your health care provider if you experience any of the following:  severe uncontrolled pain     Notify your health care provider if you experience any of the following:  persistent nausea and vomiting or diarrhea     Notify your health care provider if you experience any of the following:  temperature >100.4     Activity as tolerated        Follow-up Information     Katharina Quick MD In 3 weeks.    Specialties: Obstetrics, Obstetrics and Gynecology  Why: Postpartum Visit  Contact information:  [address]  [address] [facility]  [place] [id number] [phone]                          Christiano Huerta M.D.  OB/GYN  [facility]

## 2022-01-14 NOTE — PLAN OF CARE
Mother will breastfeed on cue at least eight or more times in 24 hours. Will pump and hand express then supplement with EBM as needed. Will keep track of feedings and wet and dirty diapers. Will call with any breastfeeding needs.

## 2022-01-14 NOTE — LACTATION NOTE
[place] [facility]  Lactation Note - Mom    SUMMARY     Maternal Assessment    Breast Size Issue: none  Breast Shape: Bilateral:,pendulous  Breast Density: Bilateral:,soft  Areola: Bilateral:,elastic  Nipples: Bilateral:,everted      LATCH Score         Breasts WDL    Breast WDL: (P) WDL    Maternal Infant Feeding    Maternal Preparation: breast care,hand hygiene  Maternal Emotional State: assist needed,relaxed  Infant Positioning: cross-cradle  Signs of Milk Transfer: infant jaw motion present  Pain with Feeding: no  Comfort Measures Before/During Feeding: infant position adjusted,latch adjusted,maternal position adjusted,suction broken using finger  Nipple Shape After Feeding, Right: round  Latch Assistance: yes    Lactation Referrals         Lactation Interventions    Breast Care: Breastfeeding: (P) breast milk to nipples,milk massaged towards nipple,open to air  Breastfeeding Assistance: (P) assisted with positioning,feeding cue recognition promoted,feeding on demand promoted,feeding session observed,hand expression verified,infant latch-on verified,infant stimulated to wakeful state,infant suck/swallow verified,support offered  Breast Care: Breastfeeding: (P) breast milk to nipples,milk massaged towards nipple,open to air  Breastfeeding Assistance: (P) assisted with positioning,feeding cue recognition promoted,feeding on demand promoted,feeding session observed,hand expression verified,infant latch-on verified,infant stimulated to wakeful state,infant suck/swallow verified,support offered  Breastfeeding Support: (P) encouragement provided,lactation counseling provided       Breastfeeding Session    Breast Pumping Interventions: frequent pumping encouraged,post-feed pumping encouraged (enc to BR/pump/hand express 8+ times/24hrs until baby able to BR effectively & consistently)  Infant Positioning: cross-cradle  Signs of Milk Transfer: infant jaw motion present    Maternal Information    Date of Referral:  22  Person Making Referral: nurse  Infant Reason for Referral:  infant,no latch within 24 hours,other (see comments) (assistance with first BR)

## 2022-01-14 NOTE — LACTATION NOTE
This note was copied from a baby's chart.  Breastfeeding assistance provided. Baby too sleepy to latch. Demonstrated waking techniques. Baby aroused to stimulation momentarily but fell back asleep. Latched but only held nipple in mouth- no active sucking or swallowing noted. Mother demonstrated hand expression and was able to obtain about 2 spoonfuls of colostrum that were fed to the baby via the spoon. Encouraged post feed pumping. Ryann RN at bedside- informed of baby too sleepy to effectively breastfeed.     [facility]  Lactation Note - Baby    SUMMARY     Feeding Method    breastfeeding    Breastfeeding    breastfeeding, bilateral    LATCH Score    Latch: 0-->too sleepy or reluctant, no latch achieved  Audible Swallowin-->none  Type of Nipple: 2-->everted (after stimulation)  Comfort (Breast/Nipple): 1-->filling, red/small blisters/bruises, mild/mod discomfort  Hold (Positioning): 1-->minimal assist, teach one side, mother does other, staff holds  Score: 4    Breastfeeding Supplementation    Nipple Used For Feeding: standard flow    Nutrition Interventions    Hypoglycemia Management (Infant): blood glucose monitoring  Oral Nutrition Promotion (Infant): breastfeeding promoted

## 2022-01-14 NOTE — NURSING
Pt going to room-in for tonight.  Knows she cannot stay longer than one night unless given permission if baby to be d/c'd soon.

## 2022-01-14 NOTE — PLAN OF CARE
SW made attempt of telephonic contact with patient ( [phone]) to complete discharge planning assessment. SW left voice message requesting a return call.            01/14/22 0819   OB Discharge Planning Assessment   Assessment Type Discharge Planning Assessment

## 2022-01-17 ENCOUNTER — PATIENT MESSAGE (OUTPATIENT)
Dept: OBSTETRICS AND GYNECOLOGY | Facility: CLINIC | Age: 32
End: 2022-01-17
Payer: MEDICAID

## 2022-01-18 ENCOUNTER — TELEPHONE (OUTPATIENT)
Dept: LACTATION | Facility: HOSPITAL | Age: 32
End: 2022-01-18
Payer: MEDICAID

## 2022-01-18 RX ORDER — ACETAMINOPHEN AND CODEINE PHOSPHATE 120; 12 MG/5ML; MG/5ML
1 SOLUTION ORAL DAILY
Qty: 30 TABLET | Refills: 11 | Status: SHIPPED | OUTPATIENT
Start: 2022-01-18 | End: 2022-03-02 | Stop reason: SDUPTHER

## 2022-01-18 NOTE — TELEPHONE ENCOUNTER
Pt states she continues to pump and breastfeed occassionally. States she mostly pumps though because the baby is sleepy at the breast. States she has not gotten her electric breast pump in yet so she has been using a manual pump. Discussed available hospital pump. States she is interested in it and will be here today. Reports a pain in the back of her breast. Denies fever, body aches, chills, discharge, discoloration, or blood. States she thought it was her back so she has been wearing a support bra but it's not going away. Discussed possible plugged duct. Engorgement measures reviewed. Encouraged moist heat, massage compression and frequent emptying of breasts. Encouraged hand expression and massage moist heat and cold application after.

## 2022-01-20 ENCOUNTER — PATIENT MESSAGE (OUTPATIENT)
Dept: OBSTETRICS AND GYNECOLOGY | Facility: CLINIC | Age: 32
End: 2022-01-20
Payer: MEDICAID

## 2022-01-21 RX ORDER — FLUCONAZOLE 150 MG/1
TABLET ORAL
Qty: 2 TABLET | Refills: 1 | Status: SHIPPED | OUTPATIENT
Start: 2022-01-21 | End: 2022-04-22

## 2022-01-21 RX ORDER — NITROFURANTOIN 25; 75 MG/1; MG/1
100 CAPSULE ORAL 2 TIMES DAILY
Qty: 14 CAPSULE | Refills: 0 | Status: SHIPPED | OUTPATIENT
Start: 2022-01-21 | End: 2022-01-28

## 2022-01-21 NOTE — TELEPHONE ENCOUNTER
Rx macrobid sent in case of UTI and diflucan if its yeast sent. She can see me in clinic if the symptoms persist. Safe in breastfeeding    Katharina Quick MD, FACOG  OB/GYN

## 2022-02-04 ENCOUNTER — OFFICE VISIT (OUTPATIENT)
Dept: OBSTETRICS AND GYNECOLOGY | Facility: CLINIC | Age: 32
End: 2022-02-04
Payer: MEDICAID

## 2022-02-04 PROCEDURE — 1160F PR REVIEW ALL MEDS BY PRESCRIBER/CLIN PHARMACIST DOCUMENTED: ICD-10-PCS | Mod: CPTII,95,, | Performed by: OBSTETRICS & GYNECOLOGY

## 2022-02-04 PROCEDURE — 1159F PR MEDICATION LIST DOCUMENTED IN MEDICAL RECORD: ICD-10-PCS | Mod: CPTII,95,, | Performed by: OBSTETRICS & GYNECOLOGY

## 2022-02-04 PROCEDURE — 1160F RVW MEDS BY RX/DR IN RCRD: CPT | Mod: CPTII,95,, | Performed by: OBSTETRICS & GYNECOLOGY

## 2022-02-04 PROCEDURE — 0503F POSTPARTUM CARE VISIT: CPT | Mod: CPTII,95,, | Performed by: OBSTETRICS & GYNECOLOGY

## 2022-02-04 PROCEDURE — 0503F PR POSTPARTUM CARE VISIT: ICD-10-PCS | Mod: CPTII,95,, | Performed by: OBSTETRICS & GYNECOLOGY

## 2022-02-04 PROCEDURE — 1159F MED LIST DOCD IN RCRD: CPT | Mod: CPTII,95,, | Performed by: OBSTETRICS & GYNECOLOGY

## 2022-02-04 NOTE — PROGRESS NOTES
CC: Post-partum follow-up    Christianne Faria is a 31 y.o. female  who presents for post-partum visit.  Pregnancy was complicated- Rh negative. She is S/P a .  She and the baby are doing well.  No pain.  No fever.  No bowel /breast/ bladder complaints.    Delivery Date: 2022  Delivery MD: Abdelrahman  Gender: female  Birth Weight: 7 pounds 6 ounces  Breast Feeding: YES  Depression: NO  - reports good support system,  Contraception: oral progesterone-only contraceptive    LMP 04/10/2021 (Exact Date)       ROS:  GENERAL: Denies fever or chills.   SKIN: Denies rash or lesions.   HEAD: Denies head injury or headache.   CHEST: Denies chest pain or shortness of breath.   CARDIOVASCULAR: Denies palpitations or chest pain.   ABDOMEN: No constipation, diarrhea, nausea, vomiting or rectal bleeding.   URINARY: see HPI  REPRODUCTIVE: See HPI.   BREASTS: see HPI  NEUROLOGIC: Denies syncope or weakness.     Physical Exam:Limited by telemed    ASSESSMENT/PLAN:  1. Postpartum state        Doing well S/P   Instructions / precautions reviewed           RTC for 6 week visit        Katharina Quick MD  OB/GYN

## 2022-03-02 ENCOUNTER — POSTPARTUM VISIT (OUTPATIENT)
Dept: OBSTETRICS AND GYNECOLOGY | Facility: CLINIC | Age: 32
End: 2022-03-02
Payer: MEDICAID

## 2022-03-02 ENCOUNTER — PATIENT MESSAGE (OUTPATIENT)
Dept: OBSTETRICS AND GYNECOLOGY | Facility: CLINIC | Age: 32
End: 2022-03-02

## 2022-03-02 VITALS
DIASTOLIC BLOOD PRESSURE: 68 MMHG | BODY MASS INDEX: 35.95 KG/M2 | WEIGHT: 216.06 LBS | SYSTOLIC BLOOD PRESSURE: 110 MMHG

## 2022-03-02 DIAGNOSIS — Z30.09 ENCOUNTER FOR OTHER GENERAL COUNSELING OR ADVICE ON CONTRACEPTION: ICD-10-CM

## 2022-03-02 PROCEDURE — 0503F PR POSTPARTUM CARE VISIT: ICD-10-PCS | Mod: CPTII,,, | Performed by: OBSTETRICS & GYNECOLOGY

## 2022-03-02 PROCEDURE — 0503F POSTPARTUM CARE VISIT: CPT | Mod: CPTII,,, | Performed by: OBSTETRICS & GYNECOLOGY

## 2022-03-02 PROCEDURE — 99999 PR PBB SHADOW E&M-EST. PATIENT-LVL II: ICD-10-PCS | Mod: PBBFAC,,, | Performed by: OBSTETRICS & GYNECOLOGY

## 2022-03-02 PROCEDURE — 99212 OFFICE O/P EST SF 10 MIN: CPT | Mod: PBBFAC,PO | Performed by: OBSTETRICS & GYNECOLOGY

## 2022-03-02 PROCEDURE — 99999 PR PBB SHADOW E&M-EST. PATIENT-LVL II: CPT | Mod: PBBFAC,,, | Performed by: OBSTETRICS & GYNECOLOGY

## 2022-03-02 RX ORDER — ACETAMINOPHEN AND CODEINE PHOSPHATE 120; 12 MG/5ML; MG/5ML
1 SOLUTION ORAL DAILY
Qty: 90 TABLET | Refills: 4 | Status: SHIPPED | OUTPATIENT
Start: 2022-03-02 | End: 2022-05-27 | Stop reason: SDUPTHER

## 2022-03-02 NOTE — PROGRESS NOTES
CC: Post-partum follow-up    Christianne Faria is a 31 y.o. female  who presents for post-partum visit.  Pregnancy was complicated- Rh negative. She is S/P a .  She and the baby are doing well.  No pain.  No fever.  No bowel /breast/ bladder complaints.    Delivery Date: 2022  Delivery MD: Abdelrahman  Gender: female  Birth Weight: 7 pounds 6 ounces  Breast Feeding: YES  Depression: NO  - reports good support system,  Contraception: oral progesterone-only contraceptive    /68   Wt 98 kg (216 lb 0.8 oz)   LMP 2022   Breastfeeding Yes   BMI 35.95 kg/m²       ROS:  GENERAL: Denies fever or chills.   SKIN: Denies rash or lesions.   HEAD: Denies head injury or headache.   CHEST: Denies chest pain or shortness of breath.   CARDIOVASCULAR: Denies palpitations or chest pain.   ABDOMEN: No constipation, diarrhea, nausea, vomiting or rectal bleeding.   URINARY: see HPI  REPRODUCTIVE: See HPI.   BREASTS: see HPI  NEUROLOGIC: Denies syncope or weakness.       Physical Exam:  GENERAL: alert, appears stated age and cooperative  NEUROLOGIC: orientated to person, place and time, normal mood and affect   CHEST: Normal respiratory effort  NECK: normal appearance  SKIN: no acne, hirsutism  BREAST EXAM: not examined  ABDOMEN: abdomen is soft without significant tenderness, masses  PELVIC-deferred per pt request    ASSESSMENT/PLAN:  1. Postpartum state    2. Encounter for other general counseling or advice on contraception        Doing well S/P   Instructions / precautions reviewed  Rx POP       Orders Placed This Encounter    norethindrone (MICRONOR) 0.35 mg tablet       RTC for annual in 1yr        Katharina Quick MD  OB/GYN

## 2022-04-22 ENCOUNTER — OFFICE VISIT (OUTPATIENT)
Dept: FAMILY MEDICINE | Facility: CLINIC | Age: 32
End: 2022-04-22
Payer: MEDICAID

## 2022-04-22 ENCOUNTER — LAB VISIT (OUTPATIENT)
Dept: LAB | Facility: HOSPITAL | Age: 32
End: 2022-04-22
Attending: FAMILY MEDICINE
Payer: MEDICAID

## 2022-04-22 VITALS
HEART RATE: 77 BPM | OXYGEN SATURATION: 100 % | SYSTOLIC BLOOD PRESSURE: 110 MMHG | WEIGHT: 231.25 LBS | DIASTOLIC BLOOD PRESSURE: 70 MMHG | TEMPERATURE: 99 F | HEIGHT: 65 IN | BODY MASS INDEX: 38.53 KG/M2

## 2022-04-22 DIAGNOSIS — F41.0 PANIC ATTACK: ICD-10-CM

## 2022-04-22 DIAGNOSIS — Z01.419 WELL WOMAN EXAM: ICD-10-CM

## 2022-04-22 DIAGNOSIS — Z01.419 WELL WOMAN EXAM: Primary | ICD-10-CM

## 2022-04-22 DIAGNOSIS — F33.41 RECURRENT MAJOR DEPRESSIVE DISORDER, IN PARTIAL REMISSION: ICD-10-CM

## 2022-04-22 DIAGNOSIS — D50.8 OTHER IRON DEFICIENCY ANEMIA: ICD-10-CM

## 2022-04-22 DIAGNOSIS — F41.9 ANXIETY: ICD-10-CM

## 2022-04-22 PROBLEM — Z34.03 ENCOUNTER FOR SUPERVISION OF NORMAL FIRST PREGNANCY IN THIRD TRIMESTER: Status: RESOLVED | Noted: 2022-01-05 | Resolved: 2022-04-22

## 2022-04-22 LAB
ALBUMIN SERPL BCP-MCNC: 3.7 G/DL (ref 3.5–5.2)
ALP SERPL-CCNC: 106 U/L (ref 55–135)
ALT SERPL W/O P-5'-P-CCNC: 22 U/L (ref 10–44)
ANION GAP SERPL CALC-SCNC: 6 MMOL/L (ref 8–16)
AST SERPL-CCNC: 16 U/L (ref 10–40)
BASOPHILS # BLD AUTO: 0.06 K/UL (ref 0–0.2)
BASOPHILS NFR BLD: 0.7 % (ref 0–1.9)
BILIRUB SERPL-MCNC: 0.6 MG/DL (ref 0.1–1)
BUN SERPL-MCNC: 15 MG/DL (ref 6–20)
CALCIUM SERPL-MCNC: 9.5 MG/DL (ref 8.7–10.5)
CHLORIDE SERPL-SCNC: 107 MMOL/L (ref 95–110)
CHOLEST SERPL-MCNC: 167 MG/DL (ref 120–199)
CHOLEST/HDLC SERPL: 3.6 {RATIO} (ref 2–5)
CO2 SERPL-SCNC: 27 MMOL/L (ref 23–29)
CREAT SERPL-MCNC: 0.9 MG/DL (ref 0.5–1.4)
DIFFERENTIAL METHOD: ABNORMAL
EOSINOPHIL # BLD AUTO: 0.2 K/UL (ref 0–0.5)
EOSINOPHIL NFR BLD: 2.7 % (ref 0–8)
ERYTHROCYTE [DISTWIDTH] IN BLOOD BY AUTOMATED COUNT: 13.6 % (ref 11.5–14.5)
EST. GFR  (AFRICAN AMERICAN): >60 ML/MIN/1.73 M^2
EST. GFR  (NON AFRICAN AMERICAN): >60 ML/MIN/1.73 M^2
ESTIMATED AVG GLUCOSE: 103 MG/DL (ref 68–131)
FERRITIN SERPL-MCNC: 52 NG/ML (ref 20–300)
GLUCOSE SERPL-MCNC: 84 MG/DL (ref 70–110)
HBA1C MFR BLD: 5.2 % (ref 4–5.6)
HCT VFR BLD AUTO: 39.9 % (ref 37–48.5)
HDLC SERPL-MCNC: 46 MG/DL (ref 40–75)
HDLC SERPL: 27.5 % (ref 20–50)
HGB BLD-MCNC: 12.5 G/DL (ref 12–16)
IMM GRANULOCYTES # BLD AUTO: 0.01 K/UL (ref 0–0.04)
IMM GRANULOCYTES NFR BLD AUTO: 0.1 % (ref 0–0.5)
IRON SERPL-MCNC: 123 UG/DL (ref 30–160)
LDLC SERPL CALC-MCNC: 93.8 MG/DL (ref 63–159)
LYMPHOCYTES # BLD AUTO: 2.5 K/UL (ref 1–4.8)
LYMPHOCYTES NFR BLD: 29.6 % (ref 18–48)
MCH RBC QN AUTO: 27.5 PG (ref 27–31)
MCHC RBC AUTO-ENTMCNC: 31.3 G/DL (ref 32–36)
MCV RBC AUTO: 88 FL (ref 82–98)
MONOCYTES # BLD AUTO: 0.6 K/UL (ref 0.3–1)
MONOCYTES NFR BLD: 6.6 % (ref 4–15)
NEUTROPHILS # BLD AUTO: 5.1 K/UL (ref 1.8–7.7)
NEUTROPHILS NFR BLD: 60.3 % (ref 38–73)
NONHDLC SERPL-MCNC: 121 MG/DL
NRBC BLD-RTO: 0 /100 WBC
PLATELET # BLD AUTO: 317 K/UL (ref 150–450)
PMV BLD AUTO: 10.5 FL (ref 9.2–12.9)
POTASSIUM SERPL-SCNC: 4.8 MMOL/L (ref 3.5–5.1)
PROT SERPL-MCNC: 7.2 G/DL (ref 6–8.4)
RBC # BLD AUTO: 4.54 M/UL (ref 4–5.4)
SATURATED IRON: 29 % (ref 20–50)
SODIUM SERPL-SCNC: 140 MMOL/L (ref 136–145)
TOTAL IRON BINDING CAPACITY: 420 UG/DL (ref 250–450)
TRANSFERRIN SERPL-MCNC: 284 MG/DL (ref 200–375)
TRIGL SERPL-MCNC: 136 MG/DL (ref 30–150)
TSH SERPL DL<=0.005 MIU/L-ACNC: 1.97 UIU/ML (ref 0.4–4)
WBC # BLD AUTO: 8.47 K/UL (ref 3.9–12.7)

## 2022-04-22 PROCEDURE — 83036 HEMOGLOBIN GLYCOSYLATED A1C: CPT | Performed by: FAMILY MEDICINE

## 2022-04-22 PROCEDURE — 3074F PR MOST RECENT SYSTOLIC BLOOD PRESSURE < 130 MM HG: ICD-10-PCS | Mod: CPTII,,, | Performed by: FAMILY MEDICINE

## 2022-04-22 PROCEDURE — 1159F PR MEDICATION LIST DOCUMENTED IN MEDICAL RECORD: ICD-10-PCS | Mod: CPTII,,, | Performed by: FAMILY MEDICINE

## 2022-04-22 PROCEDURE — 36415 COLL VENOUS BLD VENIPUNCTURE: CPT | Mod: PO | Performed by: FAMILY MEDICINE

## 2022-04-22 PROCEDURE — 3074F SYST BP LT 130 MM HG: CPT | Mod: CPTII,,, | Performed by: FAMILY MEDICINE

## 2022-04-22 PROCEDURE — 3008F BODY MASS INDEX DOCD: CPT | Mod: CPTII,,, | Performed by: FAMILY MEDICINE

## 2022-04-22 PROCEDURE — 3008F PR BODY MASS INDEX (BMI) DOCUMENTED: ICD-10-PCS | Mod: CPTII,,, | Performed by: FAMILY MEDICINE

## 2022-04-22 PROCEDURE — 3078F PR MOST RECENT DIASTOLIC BLOOD PRESSURE < 80 MM HG: ICD-10-PCS | Mod: CPTII,,, | Performed by: FAMILY MEDICINE

## 2022-04-22 PROCEDURE — 84443 ASSAY THYROID STIM HORMONE: CPT | Performed by: FAMILY MEDICINE

## 2022-04-22 PROCEDURE — 1160F PR REVIEW ALL MEDS BY PRESCRIBER/CLIN PHARMACIST DOCUMENTED: ICD-10-PCS | Mod: CPTII,,, | Performed by: FAMILY MEDICINE

## 2022-04-22 PROCEDURE — 3078F DIAST BP <80 MM HG: CPT | Mod: CPTII,,, | Performed by: FAMILY MEDICINE

## 2022-04-22 PROCEDURE — 99999 PR PBB SHADOW E&M-EST. PATIENT-LVL IV: CPT | Mod: PBBFAC,,, | Performed by: FAMILY MEDICINE

## 2022-04-22 PROCEDURE — 84466 ASSAY OF TRANSFERRIN: CPT | Performed by: FAMILY MEDICINE

## 2022-04-22 PROCEDURE — 85025 COMPLETE CBC W/AUTO DIFF WBC: CPT | Performed by: FAMILY MEDICINE

## 2022-04-22 PROCEDURE — 99214 OFFICE O/P EST MOD 30 MIN: CPT | Mod: PBBFAC,PO | Performed by: FAMILY MEDICINE

## 2022-04-22 PROCEDURE — 1160F RVW MEDS BY RX/DR IN RCRD: CPT | Mod: CPTII,,, | Performed by: FAMILY MEDICINE

## 2022-04-22 PROCEDURE — 82728 ASSAY OF FERRITIN: CPT | Performed by: FAMILY MEDICINE

## 2022-04-22 PROCEDURE — 80061 LIPID PANEL: CPT | Performed by: FAMILY MEDICINE

## 2022-04-22 PROCEDURE — 99999 PR PBB SHADOW E&M-EST. PATIENT-LVL IV: ICD-10-PCS | Mod: PBBFAC,,, | Performed by: FAMILY MEDICINE

## 2022-04-22 PROCEDURE — 99395 PREV VISIT EST AGE 18-39: CPT | Mod: S$PBB,,, | Performed by: FAMILY MEDICINE

## 2022-04-22 PROCEDURE — 80053 COMPREHEN METABOLIC PANEL: CPT | Performed by: FAMILY MEDICINE

## 2022-04-22 PROCEDURE — 1159F MED LIST DOCD IN RCRD: CPT | Mod: CPTII,,, | Performed by: FAMILY MEDICINE

## 2022-04-22 PROCEDURE — 99395 PR PREVENTIVE VISIT,EST,18-39: ICD-10-PCS | Mod: S$PBB,,, | Performed by: FAMILY MEDICINE

## 2022-04-22 RX ORDER — BUPROPION HYDROCHLORIDE 150 MG/1
150 TABLET ORAL DAILY
Qty: 90 TABLET | Refills: 1 | Status: SHIPPED | OUTPATIENT
Start: 2022-04-22 | End: 2022-10-20 | Stop reason: SDUPTHER

## 2022-04-22 NOTE — PROGRESS NOTES
"Subjective:       Patient ID: Christianne Faria is a 31 y.o. female.    Chief Complaint: Annual Exam      Christianne Faria is a 31 y.o. female who presents today for an annual.      Diet/Exercise: she had lost weight last year. Has gained since then. Has recently given birth. She has started running, 3x/week.      Labs: ordered today.       She had been talking to a therapist. Has stopped, didn't like her. Refer back.       Anemia better with iron. Has stopped blood donation.      Mammogram: 40     Taking wellbutrin 300 mg. Didn't tolerate SSRI in the past. Was on 150 mg in the past. She would like to decrease to 150 mg again. "I am handling things a lot better." she has started meditation.      PMHx: reviewed in EMR and updated  Meds: reviewed in EMR and updated  Shx: reviewed in EMR and updated  FMHx: reviewed in EMR and updated  Social: lives with her BF their daughter, and his parents. There are two cats and two dogs at home. She is back to work, 2 days/week. At [employer]. She is a .She is much happier with this new job. Her mother passed away due to breast cancer in mid 2021. 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       Review of Systems   Constitutional: Negative for chills and fever.   Respiratory: Negative for shortness of breath.    Cardiovascular: Negative for chest pain.   Gastrointestinal: Negative for nausea and vomiting.   Genitourinary: Negative for difficulty urinating.   Skin: Negative for rash.   Neurological: Negative for dizziness, light-headedness and headaches.   Psychiatric/Behavioral: Negative for dysphoric mood, self-injury, sleep disturbance and suicidal ideas. The patient is not nervous/anxious.          There are no preventive care reminders to display for this patient.  Immunization History   Administered Date(s) Administered    COVID-19 Vaccine 12/29/2021    COVID-19, MRNA, LN-S, PF (MODERNA FULL 0.5 ML DOSE) 03/23/2021, 04/20/2021    DTaP 1991, 01/14/1992, " 03/09/1992, 10/29/1992, 09/19/1996    HIB 01/14/1992, 03/09/1992, 09/01/1992, 10/29/1992    Hepatitis B, Pediatric/Adolescent 06/03/1999, 10/25/2006, 01/03/2007    IPV 1991, 01/14/1992, 10/29/1992, 09/19/1996    Influenza - Quadrivalent - PF *Preferred* (6 months and older) 10/16/2020, 12/20/2021    MMR 10/29/1992, 09/19/1996, 01/13/2022    Meningococcal Conjugate (MCV4P) 01/03/2007    Rho (D) Immune Globulin 10/20/2021    Rho (D) Immune Globulin - IM 01/14/2022    Tdap 10/25/2006, 10/20/2021    Varicella 06/03/1999, 02/11/2009           Objective:     Vitals:    04/22/22 1031   BP: 110/70   Pulse: 77   Temp: 99 °F (37.2 °C)        Physical Exam  Vitals and nursing note reviewed.   Constitutional:       General: She is not in acute distress.     Appearance: She is well-developed. She is obese. She is not diaphoretic.   HENT:      Head: Normocephalic and atraumatic.   Cardiovascular:      Rate and Rhythm: Normal rate and regular rhythm.   Pulmonary:      Effort: Pulmonary effort is normal.      Breath sounds: Normal breath sounds.   Abdominal:      Palpations: Abdomen is soft.      Tenderness: There is no abdominal tenderness.   Neurological:      Mental Status: She is alert and oriented to person, place, and time.   Psychiatric:         Behavior: Behavior normal.         Thought Content: Thought content normal.         Judgment: Judgment normal.         Assessment:       1. Well woman exam    2. BMI 38.0-38.9,adult    3. Other iron deficiency anemia    4. Recurrent major depressive disorder, in partial remission    5. Anxiety    6. Panic attack        Plan:       Refer back to counseling  Decrease wellbutrin to 150 mg XR  F/u 6 months. Labs today.     Well woman exam  -     CBC Auto Differential; Future; Expected date: 04/22/2022  -     Iron and TIBC; Future; Expected date: 04/22/2022  -     Ferritin; Future; Expected date: 04/22/2022  -     Comprehensive Metabolic Panel; Future; Expected date:  04/22/2022  -     Lipid Panel; Future; Expected date: 04/22/2022  -     Hemoglobin A1C; Future; Expected date: 04/22/2022  -     TSH; Future; Expected date: 04/22/2022    BMI 38.0-38.9,adult    Other iron deficiency anemia  -     CBC Auto Differential; Future; Expected date: 04/22/2022  -     Iron and TIBC; Future; Expected date: 04/22/2022  -     Ferritin; Future; Expected date: 04/22/2022    Recurrent major depressive disorder, in partial remission  -     Ambulatory referral/consult to Psychology; Future; Expected date: 04/29/2022  -     buPROPion (WELLBUTRIN XL) 150 MG TB24 tablet; Take 1 tablet (150 mg total) by mouth once daily.  Dispense: 90 tablet; Refill: 1    Anxiety  -     Ambulatory referral/consult to Psychology; Future; Expected date: 04/29/2022  -     buPROPion (WELLBUTRIN XL) 150 MG TB24 tablet; Take 1 tablet (150 mg total) by mouth once daily.  Dispense: 90 tablet; Refill: 1    Panic attack  -     buPROPion (WELLBUTRIN XL) 150 MG TB24 tablet; Take 1 tablet (150 mg total) by mouth once daily.  Dispense: 90 tablet; Refill: 1      Warning signs discussed, patient to call with any further issues or worsening of symptoms.

## 2022-04-22 NOTE — PATIENT INSTRUCTIONS
You can try calling [phone] for  within the [facility] system. I unfortunately have no control or input with scheduling in this department    For outside of [facility] you can try Zee Somemrs at [phone]. She is a therapist, and I have worked with her in the past.   Another option for a therapist is Dr. Anastasiya Pacheco at [facility] in [place], [address]. [phone]    You can also call your insurance company and see who is in network.    Thanks    Dr. Draper

## 2022-05-04 ENCOUNTER — OFFICE VISIT (OUTPATIENT)
Dept: PSYCHIATRY | Facility: CLINIC | Age: 32
End: 2022-05-04
Payer: MEDICAID

## 2022-05-04 DIAGNOSIS — F33.0 MDD (MAJOR DEPRESSIVE DISORDER), RECURRENT EPISODE, MILD: ICD-10-CM

## 2022-05-04 DIAGNOSIS — F41.1 GAD (GENERALIZED ANXIETY DISORDER): Primary | ICD-10-CM

## 2022-05-04 PROCEDURE — 3044F PR MOST RECENT HEMOGLOBIN A1C LEVEL <7.0%: ICD-10-PCS | Mod: AH,HB,CPTII,95 | Performed by: PSYCHOLOGIST

## 2022-05-04 PROCEDURE — 3044F HG A1C LEVEL LT 7.0%: CPT | Mod: AH,HB,CPTII,95 | Performed by: PSYCHOLOGIST

## 2022-05-04 PROCEDURE — 90791 PR PSYCHIATRIC DIAGNOSTIC EVALUATION: ICD-10-PCS | Mod: AH,HB,95, | Performed by: PSYCHOLOGIST

## 2022-05-04 PROCEDURE — 90791 PSYCH DIAGNOSTIC EVALUATION: CPT | Mod: AH,HB,95, | Performed by: PSYCHOLOGIST
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
## 2022-05-18 ENCOUNTER — TELEPHONE (OUTPATIENT)
Dept: PSYCHIATRY | Facility: CLINIC | Age: 32
End: 2022-05-18
Payer: MEDICAID

## 2022-05-18 ENCOUNTER — OFFICE VISIT (OUTPATIENT)
Dept: PSYCHIATRY | Facility: CLINIC | Age: 32
End: 2022-05-18
Payer: MEDICAID

## 2022-05-18 DIAGNOSIS — F41.1 GAD (GENERALIZED ANXIETY DISORDER): Primary | ICD-10-CM

## 2022-05-18 DIAGNOSIS — F33.0 MDD (MAJOR DEPRESSIVE DISORDER), RECURRENT EPISODE, MILD: ICD-10-CM

## 2022-05-18 PROCEDURE — 3044F PR MOST RECENT HEMOGLOBIN A1C LEVEL <7.0%: ICD-10-PCS | Mod: HB,CPTII,95,AH | Performed by: PSYCHOLOGIST

## 2022-05-18 PROCEDURE — 3044F HG A1C LEVEL LT 7.0%: CPT | Mod: HB,CPTII,95,AH | Performed by: PSYCHOLOGIST

## 2022-05-18 PROCEDURE — 90834 PR PSYCHOTHERAPY W/PATIENT, 45 MIN: ICD-10-PCS | Mod: HB,95,AH, | Performed by: PSYCHOLOGIST

## 2022-05-18 PROCEDURE — 90834 PSYTX W PT 45 MINUTES: CPT | Mod: HB,95,AH, | Performed by: PSYCHOLOGIST

## 2022-05-18 NOTE — PROGRESS NOTES
Individual Psychotherapy (PhD/LCSW)    5/18/2022    Therapeutic Intervention: Met with patient.  Outpatient - Behavior modifying psychotherapy 45 min - CPT code 78346    The patient location is: Patient's home/ Patient reported that her location at the time of this visit was in the [facility]     Visit type: Virtual visit with synchronous audio and video     Each patient to whom he or she provides medical services by telemedicine is: (1) informed of the relationship between the physician and patient and the respective role of any other health care provider with respect to management of the patient; and (2) notified that he or she may decline to receive medical services by telemedicine and may withdraw from such care at any time.    Chief complaint/reason for encounter: depression and anxiety     Interval history and content of current session: Patient presented casually dressed, alert, and oriented.  Patient reported experiencing excessive anxiety/worry, restlessness/keyed up, irritability, muscle tension, panic attacks, compulsions, depressed mood, diminished interest, fatigue, worthlessness/guilt, poor concentration, altered libido, auditory hallucinations (people mumbling), and social isolation.  Patient denied current suicidal and homicidal ideations.  Explored triggers of patient's anxiety.  Patient discussed ongoing discord with her  over chores.  Active listening skills were used as patient described her frustration with her  not helping with chores especially after they had their baby.  Educated patient about various communication styles (passive, aggressive, assertive).  Educated patient about using assertive communication skills to address concerns and feelings with her .  Modeled the use of assertive communication.        Treatment plan:  · Target symptoms: depression, anxiety   · Why chosen therapy is appropriate versus another modality: relevant to diagnosis  · Outcome  monitoring methods: self-report  · Therapeutic intervention type: insight oriented psychotherapy, behavior modifying psychotherapy    Risk parameters:  Patient reports no suicidal ideation  Patient reports no homicidal ideation  Patient reports no self-injurious behavior  Patient reports no violent behavior    Verbal deficits: None    Patient's response to intervention:  The patient's response to intervention is accepting.    Progress toward goals and other mental status changes:  The patient's progress toward goals is fair .    Diagnosis:     ICD-10-CM ICD-9-CM   1. LAUREN (generalized anxiety disorder)  F41.1 300.02   2. MDD (major depressive disorder), recurrent episode, mild  F33.0 296.31       Plan:  individual psychotherapy, consult psychiatrist for medication evaluation and medication management by physician    Return to clinic: 2 weeks    Length of Service (minutes): 45

## 2022-05-27 ENCOUNTER — PATIENT MESSAGE (OUTPATIENT)
Dept: OBSTETRICS AND GYNECOLOGY | Facility: CLINIC | Age: 32
End: 2022-05-27
Payer: MEDICAID

## 2022-05-27 RX ORDER — ACETAMINOPHEN AND CODEINE PHOSPHATE 120; 12 MG/5ML; MG/5ML
1 SOLUTION ORAL DAILY
Qty: 90 TABLET | Refills: 4 | Status: SHIPPED | OUTPATIENT
Start: 2022-05-27 | End: 2023-08-05 | Stop reason: SDUPTHER

## 2022-05-27 NOTE — TELEPHONE ENCOUNTER
I can resend it again as a 90day supply but she may want to just call the pharmacy. It was sent as 90d originally    Katharina Quick MD, FACOG  OB/GYN

## 2022-06-06 ENCOUNTER — OFFICE VISIT (OUTPATIENT)
Dept: PSYCHIATRY | Facility: CLINIC | Age: 32
End: 2022-06-06
Payer: MEDICAID

## 2022-06-06 DIAGNOSIS — F41.1 GAD (GENERALIZED ANXIETY DISORDER): Primary | ICD-10-CM

## 2022-06-06 DIAGNOSIS — F33.0 MDD (MAJOR DEPRESSIVE DISORDER), RECURRENT EPISODE, MILD: ICD-10-CM

## 2022-06-06 PROCEDURE — 90837 PR PSYCHOTHERAPY W/PATIENT, 60 MIN: ICD-10-PCS | Mod: HB,95,AH, | Performed by: PSYCHOLOGIST

## 2022-06-06 PROCEDURE — 3044F HG A1C LEVEL LT 7.0%: CPT | Mod: HB,CPTII,95,AH | Performed by: PSYCHOLOGIST

## 2022-06-06 PROCEDURE — 90837 PSYTX W PT 60 MINUTES: CPT | Mod: HB,95,AH, | Performed by: PSYCHOLOGIST

## 2022-06-06 PROCEDURE — 3044F PR MOST RECENT HEMOGLOBIN A1C LEVEL <7.0%: ICD-10-PCS | Mod: HB,CPTII,95,AH | Performed by: PSYCHOLOGIST

## 2022-06-06 NOTE — PROGRESS NOTES
Individual Psychotherapy (PhD/LCSW)    2022    Therapeutic Intervention: Met with patient.  Outpatient - Behavior modifying psychotherapy 60 min - CPT code 16562    The patient location is: Patient's home/ Patient reported that her location at the time of this visit was in the [facility]     Visit type: Virtual visit with synchronous audio and video     Each patient to whom he or she provides medical services by telemedicine is: (1) informed of the relationship between the physician and patient and the respective role of any other health care provider with respect to management of the patient; and (2) notified that he or she may decline to receive medical services by telemedicine and may withdraw from such care at any time.    Chief complaint/reason for encounter: depression and anxiety     Interval history and content of current session: Patient presented casually dressed, alert, and oriented.  Patient reported experiencing excessive anxiety/worry, restlessness/keyed up, irritability, muscle tension, panic attacks, compulsions, depressed mood, diminished interest, fatigue, worthlessness/guilt, poor concentration, altered libido, auditory hallucinations (people mumbling), and social isolation.  Patient denied current suicidal and homicidal ideations.  Explored triggers of patient's anxiety.  Patient discussed stressors including her relationship with her sister and being the executor of her  mother's estate.  Active listening skills were used as patient described her relationships with her immediate family members including her concerns around her sister's wellbeing after she has to leave their mother's home when it is sold.  Patient was taught behavioral coping strategies such as sharing of feelings, setting boundaries, focusing on what is in her control, and increased assertiveness as ways to reduce feelings of depression and anxiety.  Modeled the use of assertive communication.         Treatment plan:  · Target symptoms: depression, anxiety   · Why chosen therapy is appropriate versus another modality: relevant to diagnosis  · Outcome monitoring methods: self-report  · Therapeutic intervention type: insight oriented psychotherapy, behavior modifying psychotherapy    Risk parameters:  Patient reports no suicidal ideation  Patient reports no homicidal ideation  Patient reports no self-injurious behavior  Patient reports no violent behavior    Verbal deficits: None    Patient's response to intervention:  The patient's response to intervention is accepting.    Progress toward goals and other mental status changes:  The patient's progress toward goals is fair .    Diagnosis:     ICD-10-CM ICD-9-CM   1. LAUREN (generalized anxiety disorder)  F41.1 300.02   2. MDD (major depressive disorder), recurrent episode, mild  F33.0 296.31       Plan:  individual psychotherapy, consult psychiatrist for medication evaluation and medication management by physician    Return to clinic: 2 weeks    Length of Service (minutes): 60

## 2022-06-22 ENCOUNTER — OFFICE VISIT (OUTPATIENT)
Dept: PSYCHIATRY | Facility: CLINIC | Age: 32
End: 2022-06-22
Payer: MEDICAID

## 2022-06-22 DIAGNOSIS — F33.0 MDD (MAJOR DEPRESSIVE DISORDER), RECURRENT EPISODE, MILD: ICD-10-CM

## 2022-06-22 DIAGNOSIS — F41.1 GAD (GENERALIZED ANXIETY DISORDER): Primary | ICD-10-CM

## 2022-06-22 PROCEDURE — 90837 PR PSYCHOTHERAPY W/PATIENT, 60 MIN: ICD-10-PCS | Mod: AH,HB,95, | Performed by: PSYCHOLOGIST

## 2022-06-22 PROCEDURE — 3044F PR MOST RECENT HEMOGLOBIN A1C LEVEL <7.0%: ICD-10-PCS | Mod: AH,HB,CPTII,95 | Performed by: PSYCHOLOGIST

## 2022-06-22 PROCEDURE — 90837 PSYTX W PT 60 MINUTES: CPT | Mod: AH,HB,95, | Performed by: PSYCHOLOGIST

## 2022-06-22 PROCEDURE — 3044F HG A1C LEVEL LT 7.0%: CPT | Mod: AH,HB,CPTII,95 | Performed by: PSYCHOLOGIST

## 2022-06-22 NOTE — PROGRESS NOTES
Individual Psychotherapy (PhD/LCSW)    6/22/2022    Therapeutic Intervention: Met with patient.  Outpatient - Behavior modifying psychotherapy 60 min - CPT code 33848    The patient location is: Patient's home/ Patient reported that her location at the time of this visit was in the [facility]     Visit type: Virtual visit with synchronous audio and video     Each patient to whom he or she provides medical services by telemedicine is: (1) informed of the relationship between the physician and patient and the respective role of any other health care provider with respect to management of the patient; and (2) notified that he or she may decline to receive medical services by telemedicine and may withdraw from such care at any time.    Chief complaint/reason for encounter: depression and anxiety     Interval history and content of current session: Patient presented casually dressed, alert, and oriented.  Patient reported experiencing excessive anxiety/worry, restlessness/keyed up, irritability, muscle tension, panic attacks, compulsions, depressed mood, diminished interest, fatigue, worthlessness/guilt, poor concentration, altered libido, auditory hallucinations (people mumbling), and social isolation.  Patient denied current suicidal and homicidal ideations.  Explored triggers of patient's anxiety.  Patient discussed increased anxiety around the safety of her baby and while driving or riding in vehicles.  Assisted patient in processing her fears.  Active listening skills were used as patient described communication problems with her partner.  Assisted patient in exploring her expectations of her partner.  Educated patient about using assertive communication skills to address her concerns and feelings with her partner.      Treatment plan:  · Target symptoms: depression, anxiety   · Why chosen therapy is appropriate versus another modality: relevant to diagnosis  · Outcome monitoring methods:  self-report  · Therapeutic intervention type: insight oriented psychotherapy, behavior modifying psychotherapy    Risk parameters:  Patient reports no suicidal ideation  Patient reports no homicidal ideation  Patient reports no self-injurious behavior  Patient reports no violent behavior    Verbal deficits: None    Patient's response to intervention:  The patient's response to intervention is accepting.    Progress toward goals and other mental status changes:  The patient's progress toward goals is fair .    Diagnosis:     ICD-10-CM ICD-9-CM   1. LAUREN (generalized anxiety disorder)  F41.1 300.02   2. MDD (major depressive disorder), recurrent episode, mild  F33.0 296.31       Plan:  individual psychotherapy, consult psychiatrist for medication evaluation and medication management by physician    Return to clinic: 2 weeks    Length of Service (minutes): 60

## 2022-07-13 ENCOUNTER — HOSPITAL ENCOUNTER (EMERGENCY)
Facility: HOSPITAL | Age: 32
Discharge: HOME | End: 2022-07-13
Attending: EMERGENCY MEDICINE
Payer: MEDICAID

## 2022-07-13 VITALS
RESPIRATION RATE: 18 BRPM | HEIGHT: 67 IN | DIASTOLIC BLOOD PRESSURE: 62 MMHG | SYSTOLIC BLOOD PRESSURE: 111 MMHG | OXYGEN SATURATION: 100 % | WEIGHT: 240 LBS | HEART RATE: 66 BPM | BODY MASS INDEX: 37.67 KG/M2 | TEMPERATURE: 98 F

## 2022-07-13 DIAGNOSIS — M54.6 ACUTE RIGHT-SIDED THORACIC BACK PAIN: Primary | ICD-10-CM

## 2022-07-13 LAB
ALBUMIN SERPL BCP-MCNC: 3.7 G/DL (ref 3.5–5.2)
ALP SERPL-CCNC: 120 U/L (ref 55–135)
ALT SERPL W/O P-5'-P-CCNC: 16 U/L (ref 10–44)
ANION GAP SERPL CALC-SCNC: 7 MMOL/L (ref 8–16)
AST SERPL-CCNC: 23 U/L (ref 10–40)
B-HCG UR QL: NEGATIVE
BASOPHILS # BLD AUTO: 0.05 K/UL (ref 0–0.2)
BASOPHILS NFR BLD: 0.6 % (ref 0–1.9)
BILIRUB SERPL-MCNC: 0.5 MG/DL (ref 0.1–1)
BILIRUB UR QL STRIP: NEGATIVE
BUN SERPL-MCNC: 15 MG/DL (ref 6–20)
CALCIUM SERPL-MCNC: 9.3 MG/DL (ref 8.7–10.5)
CHLORIDE SERPL-SCNC: 107 MMOL/L (ref 95–110)
CLARITY UR: CLEAR
CO2 SERPL-SCNC: 22 MMOL/L (ref 23–29)
COLOR UR: YELLOW
CREAT SERPL-MCNC: 0.9 MG/DL (ref 0.5–1.4)
CTP QC/QA: YES
DIFFERENTIAL METHOD: NORMAL
EOSINOPHIL # BLD AUTO: 0.2 K/UL (ref 0–0.5)
EOSINOPHIL NFR BLD: 2.2 % (ref 0–8)
ERYTHROCYTE [DISTWIDTH] IN BLOOD BY AUTOMATED COUNT: 12.3 % (ref 11.5–14.5)
EST. GFR  (AFRICAN AMERICAN): >60 ML/MIN/1.73 M^2
EST. GFR  (NON AFRICAN AMERICAN): >60 ML/MIN/1.73 M^2
GLUCOSE SERPL-MCNC: 102 MG/DL (ref 70–110)
GLUCOSE UR QL STRIP: NEGATIVE
HCT VFR BLD AUTO: 39.8 % (ref 37–48.5)
HGB BLD-MCNC: 13.3 G/DL (ref 12–16)
HGB UR QL STRIP: NEGATIVE
IMM GRANULOCYTES # BLD AUTO: 0.02 K/UL (ref 0–0.04)
IMM GRANULOCYTES NFR BLD AUTO: 0.2 % (ref 0–0.5)
KETONES UR QL STRIP: NEGATIVE
LEUKOCYTE ESTERASE UR QL STRIP: NEGATIVE
LIPASE SERPL-CCNC: 35 U/L (ref 4–60)
LYMPHOCYTES # BLD AUTO: 2.5 K/UL (ref 1–4.8)
LYMPHOCYTES NFR BLD: 28.1 % (ref 18–48)
MCH RBC QN AUTO: 27.6 PG (ref 27–31)
MCHC RBC AUTO-ENTMCNC: 33.4 G/DL (ref 32–36)
MCV RBC AUTO: 83 FL (ref 82–98)
MONOCYTES # BLD AUTO: 0.6 K/UL (ref 0.3–1)
MONOCYTES NFR BLD: 6.7 % (ref 4–15)
NEUTROPHILS # BLD AUTO: 5.5 K/UL (ref 1.8–7.7)
NEUTROPHILS NFR BLD: 62.2 % (ref 38–73)
NITRITE UR QL STRIP: NEGATIVE
NRBC BLD-RTO: 0 /100 WBC
PH UR STRIP: 6 [PH] (ref 5–8)
PLATELET # BLD AUTO: 305 K/UL (ref 150–450)
PLATELET BLD QL SMEAR: NORMAL
PMV BLD AUTO: NORMAL FL (ref 9.2–12.9)
POTASSIUM SERPL-SCNC: 4.7 MMOL/L (ref 3.5–5.1)
PROT SERPL-MCNC: 7.9 G/DL (ref 6–8.4)
PROT UR QL STRIP: NEGATIVE
RBC # BLD AUTO: 4.82 M/UL (ref 4–5.4)
SODIUM SERPL-SCNC: 136 MMOL/L (ref 136–145)
SP GR UR STRIP: 1.02 (ref 1–1.03)
URN SPEC COLLECT METH UR: NORMAL
UROBILINOGEN UR STRIP-ACNC: NEGATIVE EU/DL
WBC # BLD AUTO: 8.77 K/UL (ref 3.9–12.7)

## 2022-07-13 PROCEDURE — 96361 HYDRATE IV INFUSION ADD-ON: CPT

## 2022-07-13 PROCEDURE — 81003 URINALYSIS AUTO W/O SCOPE: CPT | Performed by: EMERGENCY MEDICINE

## 2022-07-13 PROCEDURE — 25000003 PHARM REV CODE 250: Performed by: NURSE PRACTITIONER

## 2022-07-13 PROCEDURE — 80053 COMPREHEN METABOLIC PANEL: CPT | Performed by: EMERGENCY MEDICINE

## 2022-07-13 PROCEDURE — 81025 URINE PREGNANCY TEST: CPT | Performed by: EMERGENCY MEDICINE

## 2022-07-13 PROCEDURE — 25000003 PHARM REV CODE 250: Performed by: PHYSICIAN ASSISTANT

## 2022-07-13 PROCEDURE — 96360 HYDRATION IV INFUSION INIT: CPT

## 2022-07-13 PROCEDURE — 85025 COMPLETE CBC W/AUTO DIFF WBC: CPT | Performed by: EMERGENCY MEDICINE

## 2022-07-13 PROCEDURE — 83690 ASSAY OF LIPASE: CPT | Performed by: NURSE PRACTITIONER

## 2022-07-13 PROCEDURE — 99285 EMERGENCY DEPT VISIT HI MDM: CPT | Mod: 25

## 2022-07-13 RX ORDER — LIDOCAINE HYDROCHLORIDE 20 MG/ML
10 SOLUTION OROPHARYNGEAL ONCE
Status: COMPLETED | OUTPATIENT
Start: 2022-07-13 | End: 2022-07-13

## 2022-07-13 RX ORDER — CYCLOBENZAPRINE HCL 10 MG
10 TABLET ORAL
Status: DISCONTINUED | OUTPATIENT
Start: 2022-07-13 | End: 2022-07-13 | Stop reason: HOSPADM

## 2022-07-13 RX ORDER — CYCLOBENZAPRINE HCL 10 MG
10 TABLET ORAL 3 TIMES DAILY PRN
Qty: 20 TABLET | Refills: 0 | Status: SHIPPED | OUTPATIENT
Start: 2022-07-13 | End: 2022-07-20

## 2022-07-13 RX ORDER — IBUPROFEN 400 MG/1
800 TABLET ORAL
Status: COMPLETED | OUTPATIENT
Start: 2022-07-13 | End: 2022-07-13

## 2022-07-13 RX ORDER — IBUPROFEN 600 MG/1
600 TABLET ORAL EVERY 6 HOURS PRN
Qty: 20 TABLET | Refills: 0 | Status: SHIPPED | OUTPATIENT
Start: 2022-07-13 | End: 2022-07-18

## 2022-07-13 RX ORDER — LIDOCAINE 50 MG/G
1 PATCH TOPICAL DAILY
Qty: 15 PATCH | Refills: 0 | Status: SHIPPED | OUTPATIENT
Start: 2022-07-13 | End: 2022-07-28

## 2022-07-13 RX ORDER — FAMOTIDINE 20 MG/1
20 TABLET, FILM COATED ORAL
Status: COMPLETED | OUTPATIENT
Start: 2022-07-13 | End: 2022-07-13

## 2022-07-13 RX ORDER — ACETAMINOPHEN 500 MG
500 TABLET ORAL EVERY 4 HOURS PRN
Qty: 20 TABLET | Refills: 0 | Status: SHIPPED | OUTPATIENT
Start: 2022-07-13 | End: 2022-07-18

## 2022-07-13 RX ORDER — FAMOTIDINE 20 MG/1
20 TABLET, FILM COATED ORAL 2 TIMES DAILY
Qty: 20 TABLET | Refills: 0 | Status: SHIPPED | OUTPATIENT
Start: 2022-07-13 | End: 2022-11-07

## 2022-07-13 RX ORDER — MAG HYDROX/ALUMINUM HYD/SIMETH 200-200-20
30 SUSPENSION, ORAL (FINAL DOSE FORM) ORAL ONCE
Status: COMPLETED | OUTPATIENT
Start: 2022-07-13 | End: 2022-07-13

## 2022-07-13 RX ORDER — LIDOCAINE 50 MG/G
1 PATCH TOPICAL
Status: DISCONTINUED | OUTPATIENT
Start: 2022-07-13 | End: 2022-07-13 | Stop reason: HOSPADM

## 2022-07-13 RX ADMIN — LIDOCAINE 1 PATCH: 50 PATCH TOPICAL at 04:07

## 2022-07-13 RX ADMIN — LIDOCAINE HYDROCHLORIDE 10 ML: 20 SOLUTION ORAL; TOPICAL at 12:07

## 2022-07-13 RX ADMIN — SODIUM CHLORIDE 1000 ML: 0.9 INJECTION, SOLUTION INTRAVENOUS at 12:07

## 2022-07-13 RX ADMIN — FAMOTIDINE 20 MG: 20 TABLET ORAL at 12:07

## 2022-07-13 RX ADMIN — ALUMINUM HYDROXIDE, MAGNESIUM HYDROXIDE, AND SIMETHICONE 30 ML: 200; 200; 20 SUSPENSION ORAL at 12:07

## 2022-07-13 RX ADMIN — IBUPROFEN 800 MG: 400 TABLET, FILM COATED ORAL at 02:07

## 2022-07-13 NOTE — ED PROVIDER NOTES
Encounter Date: 7/13/2022    SCRIBE #1 NOTE: I, Irene Whitmore, am scribing for, and in the presence of,  Analilia Prescott NP. I have scribed the following portions of the note - Other sections scribed: HPI, ROS.       History     Chief Complaint   Patient presents with    Flank Pain     Pt chief complaint is right flank pain. Pt states has had right flank pain since yesterday. Pt denies any other pain at this time.      This 31 y.o female, with a medical history of Mild intermittent asthma, and Obsessive-compulsive disorder, presents to the ED c/o flank pain that began yesterday. Pt reports that she initially began to experience pain to the left flank, however, it has moved to the right side today and is radiating to the right abdomen. She states that she has also been experiencing a decreased appetite and feels dehydrated as her mouth is dry. Of note, pt reports that she is currently nursing. She denies nausea, emesis, diarrhea, fever, cough, congestion, sore throat, or any urinary issues. No other associated symptoms.     The history is provided by the patient.     Review of patient's allergies indicates:  No Known Allergies  Past Medical History:   Diagnosis Date    Anxiety     Depression     Hx of psychiatric care     Mild intermittent asthma     Obsessive-compulsive disorder      Past Surgical History:   Procedure Laterality Date    BUNIONECTOMY Right 2009     Family History   Problem Relation Age of Onset    Anxiety disorder Mother     Breast cancer Mother 67        5 months between diagnosis and passing    Glaucoma Mother     Cancer Mother         Breast cancer    Hypertension Father     Depression Brother     Asthma Brother     Diabetes Sister     Stroke Paternal Grandmother         x 2, latest in 2019    Colon cancer Neg Hx     Ovarian cancer Neg Hx     Amblyopia Neg Hx     Blindness Neg Hx     Cataracts Neg Hx     Macular degeneration Neg Hx     Retinal detachment Neg Hx     Strabismus  Neg Hx      Social History     Tobacco Use    Smoking status: Never Smoker    Smokeless tobacco: Never Used   Substance Use Topics    Alcohol use: Yes     Alcohol/week: 1.0 standard drink     Types: 1 Glasses of wine per week     Comment: 3 times a month.    Drug use: Not Currently     Types: Marijuana     Comment: Stopped 5/6/21     Review of Systems   Constitutional: Positive for appetite change (decreased). Negative for fever.   HENT: Negative for congestion and sore throat.         (+) dry mouth   Respiratory: Negative for cough and shortness of breath.    Cardiovascular: Negative for chest pain.   Gastrointestinal: Negative for diarrhea, nausea and vomiting.   Genitourinary: Positive for flank pain. Negative for dysuria.   Musculoskeletal: Negative for back pain.   Skin: Negative for rash.   Neurological: Negative for weakness.       Physical Exam     Initial Vitals [07/13/22 1130]   BP Pulse Resp Temp SpO2   110/65 70 14 98.1 °F (36.7 °C) 98 %      MAP       --         Physical Exam    ED Course   Procedures  Labs Reviewed   COMPREHENSIVE METABOLIC PANEL - Abnormal; Notable for the following components:       Result Value    CO2 22 (*)     Anion Gap 7 (*)     All other components within normal limits   CBC W/ AUTO DIFFERENTIAL   URINALYSIS, REFLEX TO URINE CULTURE    Narrative:     Specimen Source->Urine   LIPASE   POCT URINE PREGNANCY          Imaging Results          CT Renal Stone Study ABD Pelvis WO (Final result)  Result time 07/13/22 16:03:48    Final result by Brian Rosales MD (07/13/22 16:03:48)                 Impression:      1. No acute abnormality.  2. There are 2 small nonspecific hypodense foci in the left lobe of the liver favored represent small cysts.  Recommend clinical correlation and surveillance.  3. Small fat containing umbilical hernia.  4. Possible constipation.      Electronically signed by: Brian Rosales  Date:    07/13/2022  Time:    16:03             Narrative:     EXAMINATION:  CT RENAL STONE STUDY ABD PELVIS WO    CLINICAL HISTORY:  Flank pain, kidney stone suspected;    TECHNIQUE:  Low dose axial images, sagittal and coronal reformations were obtained from the lung bases to the pubic symphysis.  Contrast was not administered.    COMPARISON:  None    FINDINGS:  Heart: Normal in size. No pericardial effusion.    Lung Bases: Well aerated, without consolidation or pleural fluid.    Liver: The liver appears normal in size.  2 small subcentimeter hypodense foci in the left lobe on axial 37 and 34 are nonspecific.  Small hepatic cysts are likely.  Study is limited by lack of IV contrast.    Gallbladder: No calcified gallstones.    Bile Ducts: No evidence of dilated ducts.    Pancreas: No mass or peripancreatic fat stranding.    Spleen: Unremarkable.    Adrenals: Unremarkable.    Kidneys/ Ureters: No stone, mass or hydronephrosis bilaterally.    Bladder: No evidence of wall thickening.    Reproductive organs: The uterus is present.    GI Tract/Mesentery: No evidence of bowel obstruction or inflammation.  Mild retained feces throughout the colon.    Peritoneal Space: No ascites. No free air.    The appendix is within normal limits.    Retroperitoneum: No significant adenopathy.    Abdominal wall: Small fat containing umbilical hernia.    Vasculature: No significant atherosclerosis or aneurysm.    Bones: No acute fracture.                                 Medications   cyclobenzaprine tablet 10 mg (has no administration in time range)   LIDOcaine 5 % patch 1 patch (has no administration in time range)   famotidine tablet 20 mg (20 mg Oral Given 7/13/22 1213)   aluminum-magnesium hydroxide-simethicone 200-200-20 mg/5 mL suspension 30 mL (30 mLs Oral Given 7/13/22 1213)     And   LIDOcaine HCl 2% oral solution 10 mL (10 mLs Oral Given 7/13/22 1213)   sodium chloride 0.9% bolus 1,000 mL (1,000 mLs Intravenous New Bag 7/13/22 1223)   ibuprofen tablet 800 mg (800 mg Oral Given 7/13/22  5108)     Medical Decision Making:   ED Management:  This is Hodan Aceves PA-C. Assumed care of pt from Analilia Prescott NP at shift change.   31-year-old female with no pertinent past medical history presenting for right thoracic back pain.  Pain is worse with movement.  Exam above.  UPT negative.  UA negative for infection.  Labs unremarkable.  No renal insufficiency or significant electrolyte abnormality.  CT renal negative for nephrolithiasis or cholecystitis or findings to explain her flank pain.  Incidental finding of hepatic cyst and constipation.  Discussed with patient further will have her follow up with primary care.  Will discharge with medications for symptomatic treatment.  Pain was improved after ibuprofen gi cocktail to 2/10 from 4/10    Flexeril and lidoderm ordered prior to discharge. Will have her return to ER for worsening symptoms or as needed          Scribe Attestation:   Scribe #1: I performed the above scribed service and the documentation accurately describes the services I performed. I attest to the accuracy of the note.                 Clinical Impression:   Final diagnoses:  [M54.6] Acute right-sided thoracic back pain (Primary)          ED Disposition Condition    Discharge Stable        ED Prescriptions     Medication Sig Dispense Start Date End Date Auth. Provider    ibuprofen (ADVIL,MOTRIN) 600 MG tablet Take 1 tablet (600 mg total) by mouth every 6 (six) hours as needed for Pain. 20 tablet 7/13/2022 7/18/2022 Hodan Aceves PA-C    acetaminophen (TYLENOL) 500 MG tablet Take 1 tablet (500 mg total) by mouth every 4 (four) hours as needed. 20 tablet 7/13/2022 7/18/2022 Hodan Aceves PA-C    cyclobenzaprine (FLEXERIL) 10 MG tablet Take 1 tablet (10 mg total) by mouth 3 (three) times daily as needed. 20 tablet 7/13/2022 7/20/2022 Hodan Aceves PA-C    LIDOcaine (LIDODERM) 5 % Place 1 patch onto the skin once daily. Remove & Discard patch within 12 hours or as  directed by MD. May use 4% over the counter if not covered by insurance for 15 days 15 patch 7/13/2022 7/28/2022 Hodan Aceves PA-C    famotidine (PEPCID) 20 MG tablet Take 1 tablet (20 mg total) by mouth 2 (two) times daily. for 10 days 20 tablet 7/13/2022 7/23/2022 Hodan Aceves PA-C        Follow-up Information     Follow up With Specialties Details Why Contact Info    Endy Draper DO Family Medicine Schedule an appointment as soon as possible for a visit in 2 days for follow up [address] [facility] [id number] [phone]      [facility] Emergency Medicine Go to  If symptoms worsen, As needed [address]  [facility] [address] [phone]           I, Hodan Aceves PA-C  personally performed the services described in this documentation. All medical record entries made by the scribe were at my direction and in my presence. I have reviewed the chart and agree that the record reflects my personal performance and is accurate and complete.     Hodan Aceves PA-C  07/13/22 5129

## 2022-07-13 NOTE — DISCHARGE INSTRUCTIONS

## 2022-07-13 NOTE — Clinical Note
"Christianne Minahilda Faria was seen and treated in our emergency department on 7/13/2022.  She may return to work on 07/16/2022.       If you have any questions or concerns, please don't hesitate to call.      Hodan Aceves PA-C"

## 2022-11-07 ENCOUNTER — OFFICE VISIT (OUTPATIENT)
Dept: OBSTETRICS AND GYNECOLOGY | Facility: CLINIC | Age: 32
End: 2022-11-07
Payer: MEDICAID

## 2022-11-07 VITALS — BODY MASS INDEX: 38.15 KG/M2 | DIASTOLIC BLOOD PRESSURE: 54 MMHG | WEIGHT: 243.63 LBS | SYSTOLIC BLOOD PRESSURE: 98 MMHG

## 2022-11-07 DIAGNOSIS — N89.8 VAGINAL ODOR: Primary | ICD-10-CM

## 2022-11-07 DIAGNOSIS — Z12.4 SCREENING FOR CERVICAL CANCER: ICD-10-CM

## 2022-11-07 PROCEDURE — 1160F RVW MEDS BY RX/DR IN RCRD: CPT | Mod: CPTII,,, | Performed by: OBSTETRICS & GYNECOLOGY

## 2022-11-07 PROCEDURE — 1159F PR MEDICATION LIST DOCUMENTED IN MEDICAL RECORD: ICD-10-PCS | Mod: CPTII,,, | Performed by: OBSTETRICS & GYNECOLOGY

## 2022-11-07 PROCEDURE — 3074F PR MOST RECENT SYSTOLIC BLOOD PRESSURE < 130 MM HG: ICD-10-PCS | Mod: CPTII,,, | Performed by: OBSTETRICS & GYNECOLOGY

## 2022-11-07 PROCEDURE — 1159F MED LIST DOCD IN RCRD: CPT | Mod: CPTII,,, | Performed by: OBSTETRICS & GYNECOLOGY

## 2022-11-07 PROCEDURE — 3078F PR MOST RECENT DIASTOLIC BLOOD PRESSURE < 80 MM HG: ICD-10-PCS | Mod: CPTII,,, | Performed by: OBSTETRICS & GYNECOLOGY

## 2022-11-07 PROCEDURE — 87491 CHLMYD TRACH DNA AMP PROBE: CPT | Performed by: OBSTETRICS & GYNECOLOGY

## 2022-11-07 PROCEDURE — 81514 NFCT DS BV&VAGINITIS DNA ALG: CPT | Performed by: OBSTETRICS & GYNECOLOGY

## 2022-11-07 PROCEDURE — 87624 HPV HI-RISK TYP POOLED RSLT: CPT | Performed by: OBSTETRICS & GYNECOLOGY

## 2022-11-07 PROCEDURE — 99213 OFFICE O/P EST LOW 20 MIN: CPT | Mod: PBBFAC,PO | Performed by: OBSTETRICS & GYNECOLOGY

## 2022-11-07 PROCEDURE — 3008F BODY MASS INDEX DOCD: CPT | Mod: CPTII,,, | Performed by: OBSTETRICS & GYNECOLOGY

## 2022-11-07 PROCEDURE — 99999 PR PBB SHADOW E&M-EST. PATIENT-LVL III: CPT | Mod: PBBFAC,,, | Performed by: OBSTETRICS & GYNECOLOGY

## 2022-11-07 PROCEDURE — 88175 CYTOPATH C/V AUTO FLUID REDO: CPT | Performed by: OBSTETRICS & GYNECOLOGY

## 2022-11-07 PROCEDURE — 99213 OFFICE O/P EST LOW 20 MIN: CPT | Mod: S$PBB,,, | Performed by: OBSTETRICS & GYNECOLOGY

## 2022-11-07 PROCEDURE — 3074F SYST BP LT 130 MM HG: CPT | Mod: CPTII,,, | Performed by: OBSTETRICS & GYNECOLOGY

## 2022-11-07 PROCEDURE — 3008F PR BODY MASS INDEX (BMI) DOCUMENTED: ICD-10-PCS | Mod: CPTII,,, | Performed by: OBSTETRICS & GYNECOLOGY

## 2022-11-07 PROCEDURE — 87591 N.GONORRHOEAE DNA AMP PROB: CPT | Performed by: OBSTETRICS & GYNECOLOGY

## 2022-11-07 PROCEDURE — 3078F DIAST BP <80 MM HG: CPT | Mod: CPTII,,, | Performed by: OBSTETRICS & GYNECOLOGY

## 2022-11-07 PROCEDURE — 3044F PR MOST RECENT HEMOGLOBIN A1C LEVEL <7.0%: ICD-10-PCS | Mod: CPTII,,, | Performed by: OBSTETRICS & GYNECOLOGY

## 2022-11-07 PROCEDURE — 99999 PR PBB SHADOW E&M-EST. PATIENT-LVL III: ICD-10-PCS | Mod: PBBFAC,,, | Performed by: OBSTETRICS & GYNECOLOGY

## 2022-11-07 PROCEDURE — 1160F PR REVIEW ALL MEDS BY PRESCRIBER/CLIN PHARMACIST DOCUMENTED: ICD-10-PCS | Mod: CPTII,,, | Performed by: OBSTETRICS & GYNECOLOGY

## 2022-11-07 PROCEDURE — 99213 PR OFFICE/OUTPT VISIT, EST, LEVL III, 20-29 MIN: ICD-10-PCS | Mod: S$PBB,,, | Performed by: OBSTETRICS & GYNECOLOGY

## 2022-11-07 PROCEDURE — 3044F HG A1C LEVEL LT 7.0%: CPT | Mod: CPTII,,, | Performed by: OBSTETRICS & GYNECOLOGY

## 2022-11-07 RX ORDER — METRONIDAZOLE 500 MG/1
500 TABLET ORAL EVERY 12 HOURS
Qty: 14 TABLET | Refills: 0 | Status: SHIPPED | OUTPATIENT
Start: 2022-11-07 | End: 2022-11-14

## 2022-11-07 NOTE — PROGRESS NOTES
GYNECOLOGY OFFICE NOTE    Reason for visit: vaginal odor    HPI: Pt is a 32 y.o.  female  who presents for evaluation of vaginal odor. Denies discharge. States ~1 month had vulvar skin peeling that resolved. Tried a different soap (pumpkin spice).    Past Medical History:   Diagnosis Date    Anxiety     Depression     Hx of psychiatric care     Mild intermittent asthma     Obsessive-compulsive disorder        Past Surgical History:   Procedure Laterality Date    BUNIONECTOMY Right 2009       Family History   Problem Relation Age of Onset    Anxiety disorder Mother     Breast cancer Mother 67        5 months between diagnosis and passing    Glaucoma Mother     Cancer Mother         Breast cancer    Hypertension Father     Depression Brother     Asthma Brother     Diabetes Sister     Stroke Paternal Grandmother         x 2, latest in 2019    Colon cancer Neg Hx     Ovarian cancer Neg Hx     Amblyopia Neg Hx     Blindness Neg Hx     Cataracts Neg Hx     Macular degeneration Neg Hx     Retinal detachment Neg Hx     Strabismus Neg Hx        Social History     Tobacco Use    Smoking status: Never    Smokeless tobacco: Never   Substance Use Topics    Alcohol use: Yes     Alcohol/week: 1.0 standard drink     Types: 1 Glasses of wine per week     Comment: 3 times a month.    Drug use: Not Currently     Types: Marijuana     Comment: Stopped 22       OB History    Para Term  AB Living   1 1 1 0 0 1   SAB IAB Ectopic Multiple Live Births   0 0 0 0 1      # Outcome Date GA Lbr Celso/2nd Weight Sex Delivery Anes PTL Lv   1 Term 22 40w2d / 02:06 3.345 kg (7 lb 6 oz) F Vag-Spont EPI N XIAO       Current Outpatient Medications   Medication Sig    albuterol (VENTOLIN HFA) 90 mcg/actuation inhaler Inhale 2 puffs into the lungs every 6 (six) hours as needed for Wheezing. Rescue    buPROPion (WELLBUTRIN XL) 150 MG TB24 tablet Take 1 tablet (150 mg total) by mouth once daily.    metroNIDAZOLE (FLAGYL)  500 MG tablet Take 1 tablet (500 mg total) by mouth every 12 (twelve) hours. Do not take with alcohol for 7 days    multivitamin capsule Take 1 capsule by mouth once daily.    norethindrone (MICRONOR) 0.35 mg tablet Take 1 tablet (0.35 mg total) by mouth once daily.     No current facility-administered medications for this visit.       Allergies: Patient has no known allergies.     BP (!) 98/54   Wt 110.5 kg (243 lb 9.7 oz)   LMP 10/29/2022   BMI 38.15 kg/m²     ROS:  GENERAL: Denies fever or chills.   SKIN: Denies rash or lesions.   HEAD: Denies head injury or headache.   CHEST: Denies chest pain or shortness of breath.   CARDIOVASCULAR: Denies palpitations or chest pain.   ABDOMEN: No constipation, diarrhea, nausea, vomiting or rectal bleeding.   URINARY: No dysuria, hematuria, or burning on urination.  REPRODUCTIVE: See HPI.   BREASTS: see HPI  NEUROLOGIC: Denies syncope or weakness.     Physical Exam:  GENERAL: alert, appears stated age and cooperative  NEUROLOGIC: orientated to person, place and time, normal mood and affect   CHEST: Normal respiratory effort  NECK: normal appearance  SKIN: no acne, hirsutism  BREAST EXAM: not examined  ABDOMEN: abdomen is soft without significant tenderness, masses  EXTERNAL GENITALIA:  normal general appearance  URETHRA: normal urethra, normal urethral meatus  VAGINA:  normal mucosa, no  lesions  CERVIX:  Normal      Diagnosis:  1. Vaginal odor    2. Screening for cervical cancer        Plan:   1. F/u affirm and gc/ct discussed flagyl and lume for odor causing bacteria externally  2. Pap/hpv today    Orders Placed This Encounter    C. trachomatis/N. gonorrhoeae by AMP DNA [facility]; Cervix    Vaginosis Screen by DNA Probe    HPV High Risk Genotypes, PCR    Liquid-Based Pap Smear, Screening    metroNIDAZOLE (FLAGYL) 500 MG tablet           Face to Face time with patient: 20 minutes of total time spent on the encounter, which includes face to face time and non-face to face time  preparing to see the patient (eg, review of tests), Obtaining and/or reviewing separately obtained history, Documenting clinical information in the electronic or other health record, Independently interpreting results (not separately reported) and communicating results to the patient/family/caregiver, or Care coordination (not separately reported).         Katharina Quick MD  OB/GYN

## 2022-11-08 LAB
C TRACH DNA SPEC QL NAA+PROBE: NOT DETECTED
N GONORRHOEA DNA SPEC QL NAA+PROBE: NOT DETECTED

## 2022-11-10 LAB
BACTERIAL VAGINOSIS DNA: NEGATIVE
CANDIDA GLABRATA DNA: NEGATIVE
CANDIDA KRUSEI DNA: NEGATIVE
CANDIDA RRNA VAG QL PROBE: NEGATIVE
T VAGINALIS RRNA GENITAL QL PROBE: NEGATIVE

## 2022-11-16 ENCOUNTER — OFFICE VISIT (OUTPATIENT)
Dept: FAMILY MEDICINE | Facility: CLINIC | Age: 32
End: 2022-11-16
Payer: MEDICAID

## 2022-11-16 VITALS
SYSTOLIC BLOOD PRESSURE: 108 MMHG | OXYGEN SATURATION: 98 % | DIASTOLIC BLOOD PRESSURE: 78 MMHG | BODY MASS INDEX: 37.95 KG/M2 | WEIGHT: 242.31 LBS | HEART RATE: 92 BPM

## 2022-11-16 DIAGNOSIS — F41.0 PANIC ATTACK: ICD-10-CM

## 2022-11-16 DIAGNOSIS — M50.123 CERVICAL DISC DISORDER AT C6-C7 LEVEL WITH RADICULOPATHY: Primary | ICD-10-CM

## 2022-11-16 DIAGNOSIS — Z00.00 ENCOUNTER FOR ROUTINE HISTORY AND PHYSICAL EXAM IN FEMALE: ICD-10-CM

## 2022-11-16 DIAGNOSIS — F41.9 ANXIETY: ICD-10-CM

## 2022-11-16 DIAGNOSIS — M54.2 CERVICALGIA: ICD-10-CM

## 2022-11-16 DIAGNOSIS — F33.41 RECURRENT MAJOR DEPRESSIVE DISORDER, IN PARTIAL REMISSION: ICD-10-CM

## 2022-11-16 PROBLEM — O35.EXX0 PYELECTASIS OF FETUS ON PRENATAL ULTRASOUND: Status: RESOLVED | Noted: 2021-11-10 | Resolved: 2022-11-16

## 2022-11-16 PROCEDURE — 3078F PR MOST RECENT DIASTOLIC BLOOD PRESSURE < 80 MM HG: ICD-10-PCS | Mod: CPTII,,, | Performed by: FAMILY MEDICINE

## 2022-11-16 PROCEDURE — 3044F PR MOST RECENT HEMOGLOBIN A1C LEVEL <7.0%: ICD-10-PCS | Mod: CPTII,,, | Performed by: FAMILY MEDICINE

## 2022-11-16 PROCEDURE — 3078F DIAST BP <80 MM HG: CPT | Mod: CPTII,,, | Performed by: FAMILY MEDICINE

## 2022-11-16 PROCEDURE — 1160F RVW MEDS BY RX/DR IN RCRD: CPT | Mod: CPTII,,, | Performed by: FAMILY MEDICINE

## 2022-11-16 PROCEDURE — 99214 OFFICE O/P EST MOD 30 MIN: CPT | Mod: S$PBB,,, | Performed by: FAMILY MEDICINE

## 2022-11-16 PROCEDURE — 99999 PR PBB SHADOW E&M-EST. PATIENT-LVL IV: CPT | Mod: PBBFAC,,, | Performed by: FAMILY MEDICINE

## 2022-11-16 PROCEDURE — 3008F PR BODY MASS INDEX (BMI) DOCUMENTED: ICD-10-PCS | Mod: CPTII,,, | Performed by: FAMILY MEDICINE

## 2022-11-16 PROCEDURE — 99214 PR OFFICE/OUTPT VISIT, EST, LEVL IV, 30-39 MIN: ICD-10-PCS | Mod: S$PBB,,, | Performed by: FAMILY MEDICINE

## 2022-11-16 PROCEDURE — 3044F HG A1C LEVEL LT 7.0%: CPT | Mod: CPTII,,, | Performed by: FAMILY MEDICINE

## 2022-11-16 PROCEDURE — 99214 OFFICE O/P EST MOD 30 MIN: CPT | Mod: PBBFAC,PO | Performed by: FAMILY MEDICINE

## 2022-11-16 PROCEDURE — 1159F MED LIST DOCD IN RCRD: CPT | Mod: CPTII,,, | Performed by: FAMILY MEDICINE

## 2022-11-16 PROCEDURE — 99999 PR PBB SHADOW E&M-EST. PATIENT-LVL IV: ICD-10-PCS | Mod: PBBFAC,,, | Performed by: FAMILY MEDICINE

## 2022-11-16 PROCEDURE — 3008F BODY MASS INDEX DOCD: CPT | Mod: CPTII,,, | Performed by: FAMILY MEDICINE

## 2022-11-16 PROCEDURE — 1159F PR MEDICATION LIST DOCUMENTED IN MEDICAL RECORD: ICD-10-PCS | Mod: CPTII,,, | Performed by: FAMILY MEDICINE

## 2022-11-16 PROCEDURE — 3074F PR MOST RECENT SYSTOLIC BLOOD PRESSURE < 130 MM HG: ICD-10-PCS | Mod: CPTII,,, | Performed by: FAMILY MEDICINE

## 2022-11-16 PROCEDURE — 1160F PR REVIEW ALL MEDS BY PRESCRIBER/CLIN PHARMACIST DOCUMENTED: ICD-10-PCS | Mod: CPTII,,, | Performed by: FAMILY MEDICINE

## 2022-11-16 PROCEDURE — 3074F SYST BP LT 130 MM HG: CPT | Mod: CPTII,,, | Performed by: FAMILY MEDICINE

## 2022-11-16 RX ORDER — IBUPROFEN 600 MG/1
600 TABLET ORAL
Qty: 30 TABLET | Refills: 0 | Status: SHIPPED | OUTPATIENT
Start: 2022-11-16 | End: 2022-11-26

## 2022-11-16 RX ORDER — BUPROPION HYDROCHLORIDE 150 MG/1
150 TABLET ORAL DAILY
Qty: 90 TABLET | Refills: 1 | Status: SHIPPED | OUTPATIENT
Start: 2022-11-16 | End: 2023-04-14 | Stop reason: SDUPTHER

## 2022-11-16 RX ORDER — DICLOFENAC SODIUM 10 MG/G
2 GEL TOPICAL DAILY
Qty: 100 G | Refills: 3 | Status: SHIPPED | OUTPATIENT
Start: 2022-11-16 | End: 2024-01-30

## 2022-11-16 RX ORDER — TIZANIDINE 4 MG/1
4 TABLET ORAL NIGHTLY PRN
Qty: 30 TABLET | Refills: 0 | Status: SHIPPED | OUTPATIENT
Start: 2022-11-16 | End: 2022-11-26

## 2022-11-16 RX ORDER — GABAPENTIN 300 MG/1
300 CAPSULE ORAL NIGHTLY
Qty: 90 CAPSULE | Refills: 0 | Status: SHIPPED | OUTPATIENT
Start: 2022-11-16 | End: 2023-06-14

## 2022-11-16 NOTE — PROGRESS NOTES
Subjective:       Patient ID: Christianne Faria is a 32 y.o. female.    Chief Complaint: Anxiety    Christianne is a 32 y.o. female who presents today for f/u    Has had neck pain since 2018. Mostly on the right side. Radiates down the arm, sometimes all the way to her fingers, mostly index and thumb. Pregnancy made this worse. Valium, given for other reasons, helped    Anxiety: on wellbutrin 150 mg. Partner in the room, reports she is coping well, despite life stressors and young daughter    Review of Systems   Constitutional:  Negative for chills and fever.   Respiratory:  Negative for chest tightness and shortness of breath.    Gastrointestinal:  Negative for diarrhea, nausea and vomiting.   Musculoskeletal:  Positive for neck pain and neck stiffness.   Skin:  Negative for rash.   Psychiatric/Behavioral:  Negative for dysphoric mood, sleep disturbance and suicidal ideas. The patient is nervous/anxious.          Objective:     Vitals:    11/16/22 0933   BP: 108/78   Pulse: 92        Physical Exam  Vitals and nursing note reviewed.   Constitutional:       General: She is not in acute distress.     Appearance: She is well-developed. She is obese. She is not ill-appearing or diaphoretic.   HENT:      Head: Normocephalic and atraumatic.   Cardiovascular:      Rate and Rhythm: Normal rate and regular rhythm.   Pulmonary:      Effort: Pulmonary effort is normal.      Breath sounds: Normal breath sounds.   Neurological:      Mental Status: She is alert and oriented to person, place, and time.      Cranial Nerves: No cranial nerve deficit.      Sensory: No sensory deficit.      Motor: No abnormal muscle tone.      Comments: Strength and sensation grossly intract BL UE/LE  CN 2-12 grossly intact    Gait normal   Psychiatric:         Behavior: Behavior normal.         Thought Content: Thought content normal.         Judgment: Judgment normal.       Assessment:       1. Cervical disc disorder at C6-C7 level with radiculopathy    2.  Cervicalgia    3. Recurrent major depressive disorder, in partial remission    4. Anxiety    5. Panic attack    6. Encounter for routine history and physical exam in female        Plan:         Continue wellbutrin 150 mg   Consider increasing to 300 mg    Start gabapentin, nightly  In a week or 2, start zanaflex nightly. Can take 1/2   Both can be sedating  Ibuprofen with food PRN  MRI  PT    F/u 6 months. Annual. Labs prior.     Cervical disc disorder at C6-C7 level with radiculopathy  -     MRI Cervical Spine Without Contrast; Future; Expected date: 11/16/2022  -     gabapentin (NEURONTIN) 300 MG capsule; Take 1 capsule (300 mg total) by mouth every evening.  Dispense: 90 capsule; Refill: 0  -     diclofenac sodium (VOLTAREN) 1 % Gel; Apply 2 g topically once daily.  Dispense: 100 g; Refill: 3  -     Ambulatory referral/consult to Physical/Occupational Therapy; Future; Expected date: 11/23/2022  -     tiZANidine (ZANAFLEX) 4 MG tablet; Take 1 tablet (4 mg total) by mouth nightly as needed.  Dispense: 30 tablet; Refill: 0  -     ibuprofen (ADVIL,MOTRIN) 600 MG tablet; Take 1 tablet (600 mg total) by mouth 3 (three) times daily with meals. for 10 days  Dispense: 30 tablet; Refill: 0    Cervicalgia  -     MRI Cervical Spine Without Contrast; Future; Expected date: 11/16/2022  -     gabapentin (NEURONTIN) 300 MG capsule; Take 1 capsule (300 mg total) by mouth every evening.  Dispense: 90 capsule; Refill: 0  -     diclofenac sodium (VOLTAREN) 1 % Gel; Apply 2 g topically once daily.  Dispense: 100 g; Refill: 3  -     Ambulatory referral/consult to Physical/Occupational Therapy; Future; Expected date: 11/23/2022  -     tiZANidine (ZANAFLEX) 4 MG tablet; Take 1 tablet (4 mg total) by mouth nightly as needed.  Dispense: 30 tablet; Refill: 0  -     ibuprofen (ADVIL,MOTRIN) 600 MG tablet; Take 1 tablet (600 mg total) by mouth 3 (three) times daily with meals. for 10 days  Dispense: 30 tablet; Refill: 0    Recurrent major  depressive disorder, in partial remission  -     buPROPion (WELLBUTRIN XL) 150 MG TB24 tablet; Take 1 tablet (150 mg total) by mouth once daily.  Dispense: 90 tablet; Refill: 1    Anxiety  -     buPROPion (WELLBUTRIN XL) 150 MG TB24 tablet; Take 1 tablet (150 mg total) by mouth once daily.  Dispense: 90 tablet; Refill: 1    Panic attack  -     buPROPion (WELLBUTRIN XL) 150 MG TB24 tablet; Take 1 tablet (150 mg total) by mouth once daily.  Dispense: 90 tablet; Refill: 1    Encounter for routine history and physical exam in female  -     CBC Auto Differential; Future; Expected date: 11/16/2022  -     Comprehensive Metabolic Panel; Future; Expected date: 11/16/2022  -     Hemoglobin A1C; Future; Expected date: 11/16/2022  -     Lipid Panel; Future; Expected date: 11/16/2022  -     TSH; Future; Expected date: 11/16/2022          Warning signs discussed, patient to call with any further issues or worsening of symptoms.

## 2022-11-16 NOTE — PATIENT INSTRUCTIONS
Continue wellbutrin 150 mg   Consider increasing to 300 mg    Start gabapentin, nightly  In a week or 2, start zanaflex nightly. Can take 1/2   Both can be sedating  Ibuprofen with food PRN  MRI  PT    F/u 6 months. Annual. Labs prior.

## 2022-12-05 NOTE — NURSING
Attempted to take VS. Pt just placed on breast pump by lactation. Pt requests to do VS after pumping complete.    VS obtained. Patient ambulating freely in room. Lochia rubra &  Light.  Fundus firm at umbilicus. Pt and belongings transported to  353 with assist from RN and . Pt oriented to new room.Use of dermoplast and tracie bottle reviewed with patient; pt verbalizes understanding. Pt hasn't been able to tolerate regular diet yet, but able to tolerate full fluids; dinner tray at bedside now.  Voiding and passing flatus. Questions answered/encouraged; reassurance provided.  Pt states pain goal met with prescribed medications per MD. Pt verbalizes concern regarding status of , remains positive and in good spirit otherwise. Family support noted at bedside. POC reviewed with pt; able to verbalize acceptance and understanding. VS stable. Call light in reach, side rails up x2, nonskid socks on, bed in lowest position with wheels locked.  Remains free from falls and/or injury. NAD noted.  Will continue to monitor.   No

## 2022-12-07 ENCOUNTER — CLINICAL SUPPORT (OUTPATIENT)
Dept: REHABILITATION | Facility: HOSPITAL | Age: 32
End: 2022-12-07
Attending: FAMILY MEDICINE
Payer: MEDICAID

## 2022-12-07 DIAGNOSIS — M54.2 CERVICALGIA: ICD-10-CM

## 2022-12-07 DIAGNOSIS — M50.123 CERVICAL DISC DISORDER AT C6-C7 LEVEL WITH RADICULOPATHY: ICD-10-CM

## 2022-12-07 PROCEDURE — 97161 PT EVAL LOW COMPLEX 20 MIN: CPT | Mod: PN

## 2022-12-07 PROCEDURE — 97110 THERAPEUTIC EXERCISES: CPT | Mod: PN
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
## 2022-12-12 ENCOUNTER — CLINICAL SUPPORT (OUTPATIENT)
Dept: REHABILITATION | Facility: HOSPITAL | Age: 32
End: 2022-12-12
Payer: MEDICAID

## 2022-12-12 DIAGNOSIS — M54.2 CERVICALGIA: Primary | ICD-10-CM

## 2022-12-12 PROCEDURE — 97140 MANUAL THERAPY 1/> REGIONS: CPT | Mod: PN

## 2022-12-12 PROCEDURE — 97110 THERAPEUTIC EXERCISES: CPT | Mod: PN

## 2022-12-12 NOTE — PROGRESS NOTES
[facility]   Physical Therapy Treatment Note     Name: Christianne Faria  Clinic Number: [id number]    Therapy Diagnosis:   Encounter Diagnosis   Name Primary?    Cervicalgia Yes     Physician: Endy Draper DO    Visit Date: 12/12/2022    Physician Orders: PT Eval and Treat   Medical Diagnosis from Referral: M50.123 (ICD-10-CM) - Cervical disc disorder at C6-C7 level with radiculopathy  M54.2 (ICD-10-CM) - Cervicalgia  Evaluation Date: 12/7/2022  Authorization Period Expiration: 11/16/2023  Plan of Care Expiration: 03/05/2023  Progress Note Due: 01/07/2023  Visit # / Visits authorized: 1/ 1   FOTO: 1/3     Precautions: Standard     PTA Visit #: 0/5     Time In: 0702  Time Out: 0745  Total Billable Time: 43 minutes    SUBJECTIVE     Pt reports: patient is having more pain this morning Pain is usually worse in the morning.  She was compliant with home exercise program.  Response to previous treatment: 1st tx  Functional change: continue to monitor    Pain: 0/10  Location: neck and R UE    OBJECTIVE     Objective Measures updated at progress report unless specified.     Treatment     Christianne received the treatments listed below:      therapeutic exercises to develop strength, endurance, ROM, flexibility, and posture for 30 minutes including:  chin tuck 20x  cervical rotation on neck roll 20x  cervical extension on neck roll 20x  seated scap squeeze 20x   seated UT stretch 3x30''  seated levator scap stretch 3x30''      manual therapy techniques: Soft tissue Mobilization were applied to the: neck for 12 minutes, including:  STM to R cervical paraspinals and R UT          Patient Education and Home Exercises     Home Exercises Provided and Patient Education Provided     Education provided:   - home exercise program  -plan of care  -relaxation, stress management     Written Home Exercises Provided: Patient instructed to cont prior HEP. Exercises were reviewed and Christianne was able to demonstrate  them prior to the end of the session.  Christianne demonstrated good  understanding of the education provided. See EMR under Patient Instructions for exercises provided during therapy sessions    ASSESSMENT     Notable sensitvity to light touch and pressure in R UT. Pt demonstrates improved understanding of HEP following today's review. Education on how environmental factors/stressors can contribute to pain, encouraged patient to perform HEP with regular adherence. Overall good tolerance of today's session. Progress with sitting and standing upper extremity, and upper back strengthening in coming visits.       Christianne Is progressing well towards her goals.   Pt prognosis is Good.     Pt will continue to benefit from skilled outpatient physical therapy to address the deficits listed in the problem list box on initial evaluation, provide pt/family education and to maximize pt's level of independence in the home and community environment.     Pt's spiritual, cultural and educational needs considered and pt agreeable to plan of care and goals.     Anticipated barriers to physical therapy: scheduling     Goals:     Short Term Goals:  6 weeks in progress  1. Report decreased neck pain  <  / =  510 at worst to increase tolerance for ADL performance  2. Pt will demonstrate improved postural awareness during PT sessions  3. Pt will demonstrate 1/2 grade improvement in R shoulder flexion strength to improve lifting and carrying  4. Pt will report 50% reduction in neck and R UE symptoms  5. Pt to tolerate HEP to improve ROM and independence with ADL's.     Long Term Goals: 12 weeks in progress  1. Pt will demonstrate improved postural awareness during PT sessions  2.  Report decreased neck pain  <  / =  2-3/10 at worst to increase tolerance for ADL performance  3.  Pt will demonstrate 1 grade improvement in R shoulder flexion strength to improve lifting and carrying  4. Pt to be Independent with HEP to improve ROM and independence  with ADL's.    PLAN     Plan of care Certification: 12/7/2022 to 03/07/2023     Outpatient Physical Therapy 2 times weekly for 10 weeks to include the following interventions: Cervical/Lumbar Traction, Manual Therapy, Moist Heat/ Ice, Neuromuscular Re-ed, Patient Education, Therapeutic Activities, and Therapeutic Exercise.   Kimberly Cruz, PT

## 2022-12-19 ENCOUNTER — CLINICAL SUPPORT (OUTPATIENT)
Dept: REHABILITATION | Facility: HOSPITAL | Age: 32
End: 2022-12-19
Payer: MEDICAID

## 2022-12-19 DIAGNOSIS — M54.2 CERVICALGIA: Primary | ICD-10-CM

## 2022-12-19 PROCEDURE — 97110 THERAPEUTIC EXERCISES: CPT | Mod: PN

## 2022-12-19 NOTE — PROGRESS NOTES
[facility]   Physical Therapy Treatment Note     Name: Christianne Faria  Clinic Number: [id number]    Therapy Diagnosis:   Encounter Diagnosis   Name Primary?    Cervicalgia Yes       Physician: Endy Draper DO    Visit Date: 12/19/2022    Physician Orders: PT Eval and Treat   Medical Diagnosis from Referral: M50.123 (ICD-10-CM) - Cervical disc disorder at C6-C7 level with radiculopathy  M54.2 (ICD-10-CM) - Cervicalgia  Evaluation Date: 12/7/2022  Authorization Period Expiration: 11/16/2023  Plan of Care Expiration: 03/05/2023  Progress Note Due: 01/07/2023  Visit # / Visits authorized: 1/ 1   FOTO: 1/3     Precautions: Standard     PTA Visit #: 0/5     Time In: 0700  Time Out: 0742  Total Billable Time: 43 minutes    SUBJECTIVE     Pt reports: she has been doing well since her last session. Is not feeling pain this morning, just aware of the area where she typically has pain   She was compliant with home exercise program.  Response to previous treatment: good  Functional change: continue to monitor    Pain: 0/10  Location: neck and R UE    OBJECTIVE     Objective Measures updated at progress report unless specified.     Treatment     Christianne received the treatments listed below:      therapeutic exercises to develop strength, endurance, ROM, flexibility, and posture for 30 minutes including:  chin tuck 3x10  cervical rotation on neck roll 3'   seated scap squeeze 20x   UBE 3'/3' L1   Rows 3x10 GTB  B Shld ext 3x10 GT  ER RTB 3x10 Noel  Horizontal abduction RTB 3x8  cervical extension on neck roll 20x  seated scap squeeze 20x   seated UT stretch 3x30''  seated levator scap stretch 3x30''      manual therapy techniques: Soft tissue Mobilization were applied to the: neck for 00 minutes, including:  STM to R cervical paraspinals and R UT          Patient Education and Home Exercises     Home Exercises Provided and Patient Education Provided     Education provided:   - progressive overload/  rest  -plan of care  -relaxation, stress management     Written Home Exercises Provided: Patient instructed to cont prior HEP. Exercises were reviewed and Christianne was able to demonstrate them prior to the end of the session.  Christianne demonstrated good  understanding of the education provided. See EMR under Patient Instructions for exercises provided during therapy sessions    ASSESSMENT     Overall good tolerance of today's session including new exercises. Updated HEP this day, see under patient instruction. Continue with postural and UE strengthening in coming visits. Next visit incorporate: DB shoulder raises, prone ITY.       Christianne Is progressing well towards her goals.   Pt prognosis is Good.     Pt will continue to benefit from skilled outpatient physical therapy to address the deficits listed in the problem list box on initial evaluation, provide pt/family education and to maximize pt's level of independence in the home and community environment.     Pt's spiritual, cultural and educational needs considered and pt agreeable to plan of care and goals.     Anticipated barriers to physical therapy: scheduling     Goals:     Short Term Goals:  6 weeks in progress  1. Report decreased neck pain  <  / =  510 at worst to increase tolerance for ADL performance  2. Pt will demonstrate improved postural awareness during PT sessions  3. Pt will demonstrate 1/2 grade improvement in R shoulder flexion strength to improve lifting and carrying  4. Pt will report 50% reduction in neck and R UE symptoms  5. Pt to tolerate HEP to improve ROM and independence with ADL's.     Long Term Goals: 12 weeks in progress  1. Pt will demonstrate improved postural awareness during PT sessions  2.  Report decreased neck pain  <  / =  2-3/10 at worst to increase tolerance for ADL performance  3.  Pt will demonstrate 1 grade improvement in R shoulder flexion strength to improve lifting and carrying  4. Pt to be Independent with HEP to  improve ROM and independence with ADL's.    PLAN     Plan of care Certification: 12/7/2022 to 03/07/2023     Outpatient Physical Therapy 2 times weekly for 10 weeks to include the following interventions: Cervical/Lumbar Traction, Manual Therapy, Moist Heat/ Ice, Neuromuscular Re-ed, Patient Education, Therapeutic Activities, and Therapeutic Exercise.   Kimberly Cruz, PT

## 2022-12-21 ENCOUNTER — CLINICAL SUPPORT (OUTPATIENT)
Dept: REHABILITATION | Facility: HOSPITAL | Age: 32
End: 2022-12-21
Payer: MEDICAID

## 2022-12-21 DIAGNOSIS — M54.2 CERVICALGIA: Primary | ICD-10-CM

## 2022-12-21 PROCEDURE — 97110 THERAPEUTIC EXERCISES: CPT | Mod: PN

## 2022-12-21 NOTE — PROGRESS NOTES
"[facility]   Physical Therapy Treatment Note     Name: Christianne Faria  Clinic Number: [id number]    Therapy Diagnosis:   Encounter Diagnosis   Name Primary?    Cervicalgia Yes       Physician: Endy Draper DO    Visit Date: 12/21/2022    Physician Orders: PT Eval and Treat   Medical Diagnosis from Referral: M50.123 (ICD-10-CM) - Cervical disc disorder at C6-C7 level with radiculopathy  M54.2 (ICD-10-CM) - Cervicalgia  Evaluation Date: 12/7/2022  Authorization Period Expiration: 11/16/2023  Plan of Care Expiration: 03/05/2023  Progress Note Due: 01/07/2023  Visit # / Visits authorized: 1/ 1   FOTO: 1/3     Precautions: Standard     PTA Visit #: 0/5     Time In: 0700  Time Out: 0745  Total Billable Time: 45 minutes    SUBJECTIVE     Pt reports: "she has had better days"  She was compliant with home exercise program.  Response to previous treatment: good  Functional change: continue to monitor    Pain: 2/10  Location: neck and R UE    OBJECTIVE     Objective Measures updated at progress report unless specified.     Treatment     Christianne received the treatments listed below:      therapeutic exercises to develop strength, endurance, ROM, flexibility, and posture for 30 minutes including:  UBE 3'/3' L1   chin tuck 3x10  cervical rotation on neck roll 2'   +open book 15x  seated scap squeeze 20x   Rows 3x10 GTB  B Shld ext 3x10 GTB  ER RTB 3x10 Noel  Horizontal abduction RTB 3x8  cervical extension on neck roll 20x  seated scap squeeze 20x   seated UT stretch 3x30''  seated levator scap stretch 3x30''      manual therapy techniques: Soft tissue Mobilization were applied to the: neck for 00 minutes, including:  STM to R cervical paraspinals and R UT          Patient Education and Home Exercises     Home Exercises Provided and Patient Education Provided     Education provided:   - progressive overload/ rest  -plan of care  -relaxation, stress management     Written Home Exercises Provided: " Patient instructed to cont prior HEP. Exercises were reviewed and Christianne was able to demonstrate them prior to the end of the session.  Christianne demonstrated good  understanding of the education provided. See EMR under Patient Instructions for exercises provided during therapy sessions    ASSESSMENT     Overall good tolerance of today's session. Discussed gradual return to independent exercises including swimming, strength training. Progress UE and neck/upper back strengthening as tolerated in coming visits.       Christianne Is progressing well towards her goals.   Pt prognosis is Good.     Pt will continue to benefit from skilled outpatient physical therapy to address the deficits listed in the problem list box on initial evaluation, provide pt/family education and to maximize pt's level of independence in the home and community environment.     Pt's spiritual, cultural and educational needs considered and pt agreeable to plan of care and goals.     Anticipated barriers to physical therapy: scheduling     Goals:     Short Term Goals:  6 weeks in progress  1. Report decreased neck pain  <  / =  510 at worst to increase tolerance for ADL performance  2. Pt will demonstrate improved postural awareness during PT sessions  3. Pt will demonstrate 1/2 grade improvement in R shoulder flexion strength to improve lifting and carrying  4. Pt will report 50% reduction in neck and R UE symptoms  5. Pt to tolerate HEP to improve ROM and independence with ADL's.     Long Term Goals: 12 weeks in progress  1. Pt will demonstrate improved postural awareness during PT sessions  2.  Report decreased neck pain  <  / =  2-3/10 at worst to increase tolerance for ADL performance  3.  Pt will demonstrate 1 grade improvement in R shoulder flexion strength to improve lifting and carrying  4. Pt to be Independent with HEP to improve ROM and independence with ADL's.    PLAN     Plan of care Certification: 12/7/2022 to 03/07/2023     Outpatient  Physical Therapy 2 times weekly for 10 weeks to include the following interventions: Cervical/Lumbar Traction, Manual Therapy, Moist Heat/ Ice, Neuromuscular Re-ed, Patient Education, Therapeutic Activities, and Therapeutic Exercise.   Kimberly Cruz, PT

## 2023-01-09 NOTE — PROGRESS NOTES
"[facility]   Physical Therapy Treatment Note     Name: Christianne Faria  Clinic Number: [id number]    Therapy Diagnosis:   Encounter Diagnosis   Name Primary?    Cervicalgia Yes       Physician: Endy Draper DO    Visit Date: 1/11/2023    Physician Orders: PT Eval and Treat   Medical Diagnosis from Referral: M50.123 (ICD-10-CM) - Cervical disc disorder at C6-C7 level with radiculopathy  M54.2 (ICD-10-CM) - Cervicalgia  Evaluation Date: 12/7/2022  Authorization Period Expiration: 11/16/2023  Plan of Care Expiration: 03/05/2023  Progress Note Due: 01/07/2023  Visit # / Visits authorized: 1/ 1   FOTO: 1/3     Precautions: Standard     PTA Visit #: 0/5     Time In: 0700  Time Out: 0758  Total Billable Time: 30 minutes 1:1 with PT    SUBJECTIVE     Pt reports: Neck has been feeling alright, she has been doing her exercises. States that overexertion and stress still exacerbates her sx, so she takes things slowly  She was compliant with home exercise program.  Response to previous treatment: good  Functional change: continue to monitor    Pain: 1/10  Location: neck and R UE    OBJECTIVE     Objective Measures updated at progress report unless specified.     Treatment     Christianne received the treatments listed below:      therapeutic exercises to develop strength, endurance, ROM, flexibility, and posture for 45 minutes including:    UBE 3'/3' L1   chin tuck 3x10  cervical rotation on neck roll 2'   Open book 15x with 2" hold  + Scap Retract with ER 2x15 red TB  + Prone Lower Trap: 3x8  Rows 3x10 BTB  B Shld ext 3x10 BTB  seated scap squeeze 20x  Horizontal abduction GTB 3x8  + Standing Scaption Raise: 2x10 2lbs  + DB Shoulder Press: 3x10 5lbs  cervical extension on neck roll 20x  seated scap squeeze 20x   seated UT stretch 3x30''  seated levator scap stretch 3x30''    manual therapy techniques: Soft tissue Mobilization were applied to the: neck for 00 minutes, including:  STM to R cervical " paraspinals and R UT    Patient Education and Home Exercises     Home Exercises Provided and Patient Education Provided     Education provided:   - progressive overload/ rest  -plan of care  -relaxation, stress management     Written Home Exercises Provided: Patient instructed to cont prior HEP. Exercises were reviewed and Christianne was able to demonstrate them prior to the end of the session.  Christianne demonstrated good  understanding of the education provided. See EMR under Patient Instructions for exercises provided during therapy sessions    ASSESSMENT     First session back since December. Continued with previously established exercises to build UE strength and relieve cervical stiffness. Pt responded well to exercises with min cueing required for proper form. Will continue to progress UE strengthening and periscapular muscle strengthening as tolerated in future sessions. Reassess next session.    Christianne Is progressing well towards her goals.   Pt prognosis is Good.     Pt will continue to benefit from skilled outpatient physical therapy to address the deficits listed in the problem list box on initial evaluation, provide pt/family education and to maximize pt's level of independence in the home and community environment.     Pt's spiritual, cultural and educational needs considered and pt agreeable to plan of care and goals.     Anticipated barriers to physical therapy: scheduling     Goals:     Short Term Goals:  6 weeks in progress  1. Report decreased neck pain  <  / =  510 at worst to increase tolerance for ADL performance  2. Pt will demonstrate improved postural awareness during PT sessions  3. Pt will demonstrate 1/2 grade improvement in R shoulder flexion strength to improve lifting and carrying  4. Pt will report 50% reduction in neck and R UE symptoms  5. Pt to tolerate HEP to improve ROM and independence with ADL's.     Long Term Goals: 12 weeks in progress  1. Pt will demonstrate improved postural  awareness during PT sessions  2.  Report decreased neck pain  <  / =  2-3/10 at worst to increase tolerance for ADL performance  3.  Pt will demonstrate 1 grade improvement in R shoulder flexion strength to improve lifting and carrying  4. Pt to be Independent with HEP to improve ROM and independence with ADL's.    PLAN     Plan of care Certification: 12/7/2022 to 03/07/2023     Outpatient Physical Therapy 2 times weekly for 10 weeks to include the following interventions: Cervical/Lumbar Traction, Manual Therapy, Moist Heat/ Ice, Neuromuscular Re-ed, Patient Education, Therapeutic Activities, and Therapeutic Exercise.   Odilon Reilly, PT

## 2023-01-11 ENCOUNTER — CLINICAL SUPPORT (OUTPATIENT)
Dept: REHABILITATION | Facility: HOSPITAL | Age: 33
End: 2023-01-11
Payer: MEDICAID

## 2023-01-11 DIAGNOSIS — M54.2 CERVICALGIA: Primary | ICD-10-CM

## 2023-01-11 PROCEDURE — 97110 THERAPEUTIC EXERCISES: CPT | Mod: PN

## 2023-01-18 ENCOUNTER — CLINICAL SUPPORT (OUTPATIENT)
Dept: REHABILITATION | Facility: HOSPITAL | Age: 33
End: 2023-01-18
Payer: MEDICAID

## 2023-01-18 DIAGNOSIS — M54.2 CERVICALGIA: Primary | ICD-10-CM

## 2023-01-18 PROCEDURE — 97110 THERAPEUTIC EXERCISES: CPT | Mod: PN

## 2023-01-18 NOTE — PROGRESS NOTES
[facility]   Physical Therapy Treatment Note     Name: Christianne Faria  Clinic Number: [id number]    Therapy Diagnosis:   Encounter Diagnosis   Name Primary?    Cervicalgia Yes       Physician: Endy Draper DO    Visit Date: 1/18/2023    Physician Orders: PT Eval and Treat   Medical Diagnosis from Referral: M50.123 (ICD-10-CM) - Cervical disc disorder at C6-C7 level with radiculopathy  M54.2 (ICD-10-CM) - Cervicalgia  Evaluation Date: 12/7/2022  Authorization Period Expiration: 11/16/2023  Plan of Care Expiration: 03/05/2023  Progress Note Due: 01/07/2023 (completed 1/18/2023). Next PN due 2/15/2023  Visit # / Visits authorized: 1/ 1   FOTO: 1/3     Precautions: Standard     PTA Visit #: 0/5     Time In: 7:00am  Time Out: 7:58pm  Total Billable Time: 30 minutes 1:1 with PT    SUBJECTIVE     Pt reports: slight twinge in neck, with occasional n/t going down R UE. Overall, pt states she's feeling better since IE and has been very compliant with her HEP for except one day per week.    She was compliant with home exercise program.  Response to previous treatment: good  Functional change: continue to monitor    Pain: 2/10  Location: neck and R UE    OBJECTIVE     Objective Measures updated at progress report unless specified.     1/18/23    Observation: pleasant and cooperative     Posture: fwd head and rounded shoulders     Cervical Range of Motion:     Degrees Pain   Flexion WFL -      Extension WFL -      Right Side Bending WFL -      Left Side Bending WFL -      Right Rotation WFL -   Left Rotation WFL -      Shoulder Range of Motion:   Shoulder Left Right   Flexion 180 180   Abduction 180 180   ER 90 90   IR T8 T8      Upper Extremity Strength  (R) UE   (L) UE     Shoulder elevation: 5/5 Shoulder elevation: 5/5   Shoulder flexion: 5/5 Shoulder flexion: 5/5   Shoulder Abduction: 5/5* Shoulder abduction: 5/5   Shoulder ER 5/5 Shoulder ER 5/5   Shoulder IR 5/5 Shoulder IR 5/5   Elbow  "flexion: 5/5 Elbow flexion: 5/5   Elbow extension: 5/5 Elbow extension: 5/5   Wrist flexion: 5/5 Wrist flexion: 5/5   Wrist extension: 5/5 Wrist extension: 5/5    5/5 : 5/5   Lower Trap 4/5 Lower Trap 4+/5   Middle Trap 4/5 Middle Trap 4/5   Rhomboids 4/5 Rhomboids 5/5      Special Tests:  Distraction unchanged   Compression +   Spurlings -   Sharp-Chris -   VA test -   Lateral Flexion Alar Ligament -               Palpation: TTP R UT       Sensation: WNL to light touch     Treatment     Christinane received the treatments listed below:      therapeutic exercises to develop strength, endurance, ROM, flexibility, and posture for 55 minutes including:    Reassessment today    UBE 3'/3' L1   chin tuck 3x10  cervical rotation on neck roll 2'   Open book 15x with 2" hold  Scap Retract with ER 2x15 red TB  Prone Lower Trap: 3x8  + Matrix Seated Row Machine: 1x15 at 25lbs, 2x10 at 30lbs  Rows 3x10 BTB  B Shld ext 3x10 BTB  seated scap squeeze 20x  Horizontal abduction GTB 3x8  Standing Scaption Raise: 2x10 2lbs  DB Shoulder Press: 3x10 5lbs    cervical extension on neck roll 20x  seated scap squeeze 20x   seated UT stretch 3x30''  seated levator scap stretch 3x30''    manual therapy techniques: Soft tissue Mobilization were applied to the: neck for 00 minutes, including:  STM to R cervical paraspinals and R UT    Patient Education and Home Exercises     Home Exercises Provided and Patient Education Provided     Education provided:   - progressive overload/ rest  -plan of care  -relaxation, stress management     Written Home Exercises Provided: Patient instructed to cont prior HEP. Exercises were reviewed and Christianne was able to demonstrate them prior to the end of the session.  Christianne demonstrated good  understanding of the education provided. See EMR under Patient Instructions for exercises provided during therapy sessions    ASSESSMENT     Pt demonstrating improvements with pain free ROM and MMT upon reassessment " today. Pt demonstrates optimal form with exercises with minimal v/c required. However, pt reports she still feels a slight twinge in her neck and occasionally behind her R shoulder blade after extensive exercise. Pt will benefit from continuing PT in order to transition her to HEP focusing on building functional strength and activity tolerance, and education on symptom management at home.    Christianne Is progressing well towards her goals.   Pt prognosis is Good.     Pt will continue to benefit from skilled outpatient physical therapy to address the deficits listed in the problem list box on initial evaluation, provide pt/family education and to maximize pt's level of independence in the home and community environment.     Pt's spiritual, cultural and educational needs considered and pt agreeable to plan of care and goals.     Anticipated barriers to physical therapy: scheduling     Goals:     Short Term Goals:  6 weeks in progress  1. Report decreased neck pain  <  / =  510 at worst to increase tolerance for ADL performance MET  2. Pt will demonstrate improved postural awareness during PT sessions MET  3. Pt will demonstrate 1/2 grade improvement in R shoulder flexion strength to improve lifting and carrying MET  4. Pt will report 50% reduction in neck and R UE symptoms MET  5. Pt to tolerate HEP to improve ROM and independence with ADL's. MET      Long Term Goals: 12 weeks in progress  1. Pt will demonstrate improved postural awareness during PT sessions MET   2.  Report decreased neck pain  <  / =  2-3/10 at worst to increase tolerance for ADL performance MET  3.  Pt will demonstrate 1 grade improvement in R shoulder flexion strength to improve lifting and carryingMET  4. Pt to be Independent with HEP to improve ROM and independence with ADL's. IN PROGRESS    PLAN     Plan of care Certification: 12/7/2022 to 03/07/2023     Outpatient Physical Therapy 2 times weekly for 10 weeks to include the following  interventions: Cervical/Lumbar Traction, Manual Therapy, Moist Heat/ Ice, Neuromuscular Re-ed, Patient Education, Therapeutic Activities, and Therapeutic Exercise.   Odilon Reilly, PT

## 2023-01-24 NOTE — PROGRESS NOTES
"[facility]   Physical Therapy Treatment Note     Name: Christianne Faria  Clinic Number: [id number]    Therapy Diagnosis:   Encounter Diagnosis   Name Primary?    Cervicalgia Yes         Physician: Endy Draper DO    Visit Date: 1/25/2023    Physician Orders: PT Eval and Treat   Medical Diagnosis from Referral: M50.123 (ICD-10-CM) - Cervical disc disorder at C6-C7 level with radiculopathy  M54.2 (ICD-10-CM) - Cervicalgia  Evaluation Date: 12/7/2022  Authorization Period Expiration: 11/16/2023  Plan of Care Expiration: 03/05/2023  Progress Note Due:  Next PN due 2/15/2023  Visit # / Visits authorized: 3/17 (7 total)  FOTO: 1/3     Precautions: Standard     PTA Visit #: 0/5     Time In: 7:00am  Time Out: 7:58pm  Total Billable Time: 30 minutes 1:1 with PT    SUBJECTIVE     Pt reports: R side of neck has been bothering her yesterday and this morning with no specific SIRENA.   She was compliant with home exercise program.  Response to previous treatment: good  Functional change: continue to monitor    Pain: 2/10  Location: neck and R UE    OBJECTIVE     Objective Measures updated at progress report unless specified.        Treatment     Christianne received the treatments listed below:      therapeutic exercises to develop strength, endurance, ROM, flexibility, and posture for 40 minutes including:    Reassessment today    UBE 3'/3' L1   + Prone Shoulder Extension: 2x10  Prone Lower Trap: 3x8\  chin tuck 3x10  cervical rotation on neck roll 2'   Open book 15x with 2" hold  Scap Retract with ER 2x15 red TB  seated UT stretch 3x30''  Matrix Seated Row Machine: 3x10 at 30lbs  Rows 3x10 BTB  B Shld ext 3x10 BTB  seated scap squeeze 20x  Horizontal abduction GTB 3x8  Standing Scaption Raise: +3x10 +4lbs  DB Shoulder Press: 3x10 5lbs    cervical extension on neck roll 20x  seated scap squeeze 20x     seated levator scap stretch 3x30''    manual therapy techniques: Soft tissue Mobilization were applied to " "the: neck for 15 minutes, including:  IASTM to R Upper Trap  Cervical Distraction 4x20"  CPA T1-T7  Upglides C3-C6    Patient Education and Home Exercises     Home Exercises Provided and Patient Education Provided     Education provided:   - progressive overload/ rest  -plan of care  -relaxation, stress management     Written Home Exercises Provided: Patient instructed to cont prior HEP. Exercises were reviewed and Christianne was able to demonstrate them prior to the end of the session.  Christianne demonstrated good  understanding of the education provided. See EMR under Patient Instructions for exercises provided during therapy sessions    ASSESSMENT     Manual therapy performed to address increased sx in R cervical spine and R upper trap today. Pt responded well to manual therapy, with no significant exacerbation of sx with exercise throughout remainder of session. Continued with exercises to strengthening deep cervical flexors and periscapular musculature. Pt demonstrating good form with minimal cueing required. Pt will benefit from updated HEP upon next session prior to her d/c    Christianne Is progressing well towards her goals.   Pt prognosis is Good.     Pt will continue to benefit from skilled outpatient physical therapy to address the deficits listed in the problem list box on initial evaluation, provide pt/family education and to maximize pt's level of independence in the home and community environment.     Pt's spiritual, cultural and educational needs considered and pt agreeable to plan of care and goals.     Anticipated barriers to physical therapy: scheduling     Goals:     Short Term Goals:  6 weeks in progress  1. Report decreased neck pain  <  / =  510 at worst to increase tolerance for ADL performance MET  2. Pt will demonstrate improved postural awareness during PT sessions MET  3. Pt will demonstrate 1/2 grade improvement in R shoulder flexion strength to improve lifting and carrying MET  4. Pt will " report 50% reduction in neck and R UE symptoms MET  5. Pt to tolerate HEP to improve ROM and independence with ADL's. MET      Long Term Goals: 12 weeks in progress  1. Pt will demonstrate improved postural awareness during PT sessions MET   2.  Report decreased neck pain  <  / =  2-3/10 at worst to increase tolerance for ADL performance MET  3.  Pt will demonstrate 1 grade improvement in R shoulder flexion strength to improve lifting and carryingMET  4. Pt to be Independent with HEP to improve ROM and independence with ADL's. IN PROGRESS    PLAN     Plan of care Certification: 12/7/2022 to 03/07/2023     Outpatient Physical Therapy 2 times weekly for 10 weeks to include the following interventions: Cervical/Lumbar Traction, Manual Therapy, Moist Heat/ Ice, Neuromuscular Re-ed, Patient Education, Therapeutic Activities, and Therapeutic Exercise.   Odilon Reilly, PT

## 2023-01-25 ENCOUNTER — CLINICAL SUPPORT (OUTPATIENT)
Dept: REHABILITATION | Facility: HOSPITAL | Age: 33
End: 2023-01-25
Payer: MEDICAID

## 2023-01-25 DIAGNOSIS — M54.2 CERVICALGIA: Primary | ICD-10-CM

## 2023-01-25 PROCEDURE — 97110 THERAPEUTIC EXERCISES: CPT | Mod: PN

## 2023-02-04 ENCOUNTER — PATIENT MESSAGE (OUTPATIENT)
Dept: FAMILY MEDICINE | Facility: CLINIC | Age: 33
End: 2023-02-04
Payer: MEDICAID

## 2023-06-05 ENCOUNTER — LAB VISIT (OUTPATIENT)
Dept: LAB | Facility: HOSPITAL | Age: 33
End: 2023-06-05
Attending: FAMILY MEDICINE
Payer: MEDICAID

## 2023-06-05 DIAGNOSIS — Z00.00 ENCOUNTER FOR ROUTINE HISTORY AND PHYSICAL EXAM IN FEMALE: ICD-10-CM

## 2023-06-05 LAB
ALBUMIN SERPL BCP-MCNC: 3.7 G/DL (ref 3.5–5.2)
ALP SERPL-CCNC: 86 U/L (ref 55–135)
ALT SERPL W/O P-5'-P-CCNC: 9 U/L (ref 10–44)
ANION GAP SERPL CALC-SCNC: 8 MMOL/L (ref 8–16)
AST SERPL-CCNC: 15 U/L (ref 10–40)
BASOPHILS # BLD AUTO: 0.04 K/UL (ref 0–0.2)
BASOPHILS NFR BLD: 0.4 % (ref 0–1.9)
BILIRUB SERPL-MCNC: 0.7 MG/DL (ref 0.1–1)
BUN SERPL-MCNC: 10 MG/DL (ref 6–20)
CALCIUM SERPL-MCNC: 9.3 MG/DL (ref 8.7–10.5)
CHLORIDE SERPL-SCNC: 107 MMOL/L (ref 95–110)
CHOLEST SERPL-MCNC: 127 MG/DL (ref 120–199)
CHOLEST/HDLC SERPL: 3.8 {RATIO} (ref 2–5)
CO2 SERPL-SCNC: 24 MMOL/L (ref 23–29)
CREAT SERPL-MCNC: 0.9 MG/DL (ref 0.5–1.4)
DIFFERENTIAL METHOD: ABNORMAL
EOSINOPHIL # BLD AUTO: 0.1 K/UL (ref 0–0.5)
EOSINOPHIL NFR BLD: 1.2 % (ref 0–8)
ERYTHROCYTE [DISTWIDTH] IN BLOOD BY AUTOMATED COUNT: 13.7 % (ref 11.5–14.5)
EST. GFR  (NO RACE VARIABLE): >60 ML/MIN/1.73 M^2
ESTIMATED AVG GLUCOSE: 97 MG/DL (ref 68–131)
GLUCOSE SERPL-MCNC: 65 MG/DL (ref 70–110)
HBA1C MFR BLD: 5 % (ref 4–5.6)
HCT VFR BLD AUTO: 39.5 % (ref 37–48.5)
HDLC SERPL-MCNC: 33 MG/DL (ref 40–75)
HDLC SERPL: 26 % (ref 20–50)
HGB BLD-MCNC: 12.2 G/DL (ref 12–16)
IMM GRANULOCYTES # BLD AUTO: 0.02 K/UL (ref 0–0.04)
IMM GRANULOCYTES NFR BLD AUTO: 0.2 % (ref 0–0.5)
LDLC SERPL CALC-MCNC: 81.4 MG/DL (ref 63–159)
LYMPHOCYTES # BLD AUTO: 2.4 K/UL (ref 1–4.8)
LYMPHOCYTES NFR BLD: 24.4 % (ref 18–48)
MCH RBC QN AUTO: 26.4 PG (ref 27–31)
MCHC RBC AUTO-ENTMCNC: 30.9 G/DL (ref 32–36)
MCV RBC AUTO: 86 FL (ref 82–98)
MONOCYTES # BLD AUTO: 0.6 K/UL (ref 0.3–1)
MONOCYTES NFR BLD: 5.9 % (ref 4–15)
NEUTROPHILS # BLD AUTO: 6.7 K/UL (ref 1.8–7.7)
NEUTROPHILS NFR BLD: 67.9 % (ref 38–73)
NONHDLC SERPL-MCNC: 94 MG/DL
NRBC BLD-RTO: 0 /100 WBC
PLATELET # BLD AUTO: 339 K/UL (ref 150–450)
PMV BLD AUTO: 10.9 FL (ref 9.2–12.9)
POTASSIUM SERPL-SCNC: 4.1 MMOL/L (ref 3.5–5.1)
PROT SERPL-MCNC: 6.9 G/DL (ref 6–8.4)
RBC # BLD AUTO: 4.62 M/UL (ref 4–5.4)
SODIUM SERPL-SCNC: 139 MMOL/L (ref 136–145)
TRIGL SERPL-MCNC: 63 MG/DL (ref 30–150)
WBC # BLD AUTO: 9.91 K/UL (ref 3.9–12.7)

## 2023-06-05 PROCEDURE — 80053 COMPREHEN METABOLIC PANEL: CPT | Performed by: FAMILY MEDICINE

## 2023-06-05 PROCEDURE — 83036 HEMOGLOBIN GLYCOSYLATED A1C: CPT | Performed by: FAMILY MEDICINE

## 2023-06-05 PROCEDURE — 80061 LIPID PANEL: CPT | Performed by: FAMILY MEDICINE

## 2023-06-05 PROCEDURE — 36415 COLL VENOUS BLD VENIPUNCTURE: CPT | Mod: PO | Performed by: FAMILY MEDICINE

## 2023-06-05 PROCEDURE — 85025 COMPLETE CBC W/AUTO DIFF WBC: CPT | Performed by: FAMILY MEDICINE

## 2023-06-05 PROCEDURE — 84443 ASSAY THYROID STIM HORMONE: CPT | Performed by: FAMILY MEDICINE

## 2023-06-06 LAB — TSH SERPL DL<=0.005 MIU/L-ACNC: 0.99 UIU/ML (ref 0.4–4)

## 2023-06-14 ENCOUNTER — OFFICE VISIT (OUTPATIENT)
Dept: FAMILY MEDICINE | Facility: CLINIC | Age: 33
End: 2023-06-14
Payer: MEDICAID

## 2023-06-14 VITALS
HEART RATE: 84 BPM | BODY MASS INDEX: 33.42 KG/M2 | SYSTOLIC BLOOD PRESSURE: 104 MMHG | HEIGHT: 67 IN | OXYGEN SATURATION: 99 % | DIASTOLIC BLOOD PRESSURE: 70 MMHG | WEIGHT: 212.94 LBS

## 2023-06-14 DIAGNOSIS — M50.123 CERVICAL DISC DISORDER AT C6-C7 LEVEL WITH RADICULOPATHY: ICD-10-CM

## 2023-06-14 DIAGNOSIS — F41.0 PANIC ATTACK: ICD-10-CM

## 2023-06-14 DIAGNOSIS — Z00.00 ENCOUNTER FOR ROUTINE HISTORY AND PHYSICAL EXAM IN FEMALE: Primary | ICD-10-CM

## 2023-06-14 DIAGNOSIS — M54.2 NECK PAIN: ICD-10-CM

## 2023-06-14 DIAGNOSIS — F33.41 RECURRENT MAJOR DEPRESSIVE DISORDER, IN PARTIAL REMISSION: ICD-10-CM

## 2023-06-14 DIAGNOSIS — M54.2 CERVICALGIA: ICD-10-CM

## 2023-06-14 DIAGNOSIS — F41.9 ANXIETY: ICD-10-CM

## 2023-06-14 PROCEDURE — 3044F HG A1C LEVEL LT 7.0%: CPT | Mod: CPTII,,, | Performed by: FAMILY MEDICINE

## 2023-06-14 PROCEDURE — 1160F PR REVIEW ALL MEDS BY PRESCRIBER/CLIN PHARMACIST DOCUMENTED: ICD-10-PCS | Mod: CPTII,,, | Performed by: FAMILY MEDICINE

## 2023-06-14 PROCEDURE — 1159F MED LIST DOCD IN RCRD: CPT | Mod: CPTII,,, | Performed by: FAMILY MEDICINE

## 2023-06-14 PROCEDURE — 3044F PR MOST RECENT HEMOGLOBIN A1C LEVEL <7.0%: ICD-10-PCS | Mod: CPTII,,, | Performed by: FAMILY MEDICINE

## 2023-06-14 PROCEDURE — 3078F DIAST BP <80 MM HG: CPT | Mod: CPTII,,, | Performed by: FAMILY MEDICINE

## 2023-06-14 PROCEDURE — 99999 PR PBB SHADOW E&M-EST. PATIENT-LVL III: CPT | Mod: PBBFAC,,, | Performed by: FAMILY MEDICINE

## 2023-06-14 PROCEDURE — 3078F PR MOST RECENT DIASTOLIC BLOOD PRESSURE < 80 MM HG: ICD-10-PCS | Mod: CPTII,,, | Performed by: FAMILY MEDICINE

## 2023-06-14 PROCEDURE — 99395 PREV VISIT EST AGE 18-39: CPT | Mod: S$PBB,,, | Performed by: FAMILY MEDICINE

## 2023-06-14 PROCEDURE — 1160F RVW MEDS BY RX/DR IN RCRD: CPT | Mod: CPTII,,, | Performed by: FAMILY MEDICINE

## 2023-06-14 PROCEDURE — 99395 PR PREVENTIVE VISIT,EST,18-39: ICD-10-PCS | Mod: S$PBB,,, | Performed by: FAMILY MEDICINE

## 2023-06-14 PROCEDURE — 3074F PR MOST RECENT SYSTOLIC BLOOD PRESSURE < 130 MM HG: ICD-10-PCS | Mod: CPTII,,, | Performed by: FAMILY MEDICINE

## 2023-06-14 PROCEDURE — 99999 PR PBB SHADOW E&M-EST. PATIENT-LVL III: ICD-10-PCS | Mod: PBBFAC,,, | Performed by: FAMILY MEDICINE

## 2023-06-14 PROCEDURE — 3008F PR BODY MASS INDEX (BMI) DOCUMENTED: ICD-10-PCS | Mod: CPTII,,, | Performed by: FAMILY MEDICINE

## 2023-06-14 PROCEDURE — 3008F BODY MASS INDEX DOCD: CPT | Mod: CPTII,,, | Performed by: FAMILY MEDICINE

## 2023-06-14 PROCEDURE — 99213 OFFICE O/P EST LOW 20 MIN: CPT | Mod: PBBFAC,PO | Performed by: FAMILY MEDICINE

## 2023-06-14 PROCEDURE — 1159F PR MEDICATION LIST DOCUMENTED IN MEDICAL RECORD: ICD-10-PCS | Mod: CPTII,,, | Performed by: FAMILY MEDICINE

## 2023-06-14 PROCEDURE — 3074F SYST BP LT 130 MM HG: CPT | Mod: CPTII,,, | Performed by: FAMILY MEDICINE

## 2023-06-14 RX ORDER — DIAZEPAM 2 MG/1
2 TABLET ORAL EVERY 12 HOURS PRN
Qty: 30 TABLET | Refills: 0 | Status: SHIPPED | OUTPATIENT
Start: 2023-06-14 | End: 2024-02-07

## 2023-06-14 RX ORDER — DICLOFENAC SODIUM 50 MG/1
50 TABLET, DELAYED RELEASE ORAL 2 TIMES DAILY
Qty: 60 TABLET | Refills: 0 | Status: SHIPPED | OUTPATIENT
Start: 2023-06-14 | End: 2024-01-30

## 2023-06-14 RX ORDER — TIZANIDINE 4 MG/1
4 TABLET ORAL NIGHTLY
Qty: 90 TABLET | Refills: 0 | Status: SHIPPED | OUTPATIENT
Start: 2023-06-14 | End: 2023-09-12

## 2023-06-14 RX ORDER — BUPROPION HYDROCHLORIDE 300 MG/1
300 TABLET ORAL DAILY
Qty: 90 TABLET | Refills: 1 | Status: SHIPPED | OUTPATIENT
Start: 2023-06-14 | End: 2024-02-07 | Stop reason: SDUPTHER

## 2023-06-14 NOTE — PROGRESS NOTES
Subjective:       Patient ID: Christianne Faria is a 32 y.o. female.    Chief Complaint: Annual Exam      Christianne Faria is a 32 y.o. female who presents today for an annual.      Diet/Exercise: she had lost weight last year. She is in school and this is intense. Not eating as much. Has lost weight due to this stress.      Labs: ordered previously, reviewed today.     Anxiety: Taking wellbutrin 300 mg. Didn't tolerate SSRI in the past. Some more anxiety recently, especially with school. Had discussion, offered buspar vs valium vs low dose SSRI. Agreeable to short term valium. Discussed that not a long term solution.     Having neck pain again. MRI not covered. PT had helped. Then pain came back. Gabapentin didn't help.  Can't do PT right now, due to time restraints.     Anemia better, has stopped blood donation. Takes iron with [facility]VI.      Mammogram: 40       PMHx: reviewed in EMR and updated  Meds: reviewed in EMR and updated  Shx: reviewed in EMR and updated  FMHx: reviewed in EMR and updated  Social: lives with her BF, their daughter, and his parents. There are two cats and two dogs at home. Not currently working. Her mother passed away due to breast cancer in mid 2021. 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 Has been in school, for about 4 months. 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     Review of Systems   Constitutional:  Negative for chills and fever.   Respiratory:  Negative for shortness of breath.    Cardiovascular:  Negative for chest pain.   Gastrointestinal:  Negative for nausea and vomiting.   Musculoskeletal:  Positive for neck pain and neck stiffness.   Skin:  Negative for rash.   Neurological:  Negative for dizziness, light-headedness and headaches.   Psychiatric/Behavioral:  Positive for dysphoric mood. Negative for suicidal ideas. The patient is nervous/anxious.        Health Maintenance Due   Topic Date Due    COVID-19 Vaccine (4 - Moderna series) 02/23/2022     Immunization  History   Administered Date(s) Administered    COVID-19 Vaccine 12/29/2021    COVID-19, MRNA, LN-S, PF (MODERNA FULL 0.5 ML DOSE) 03/23/2021, 04/20/2021    DTaP 1991, 01/14/1992, 03/09/1992, 10/29/1992, 09/19/1996    HIB 01/14/1992, 03/09/1992, 09/01/1992, 10/29/1992    Hepatitis B, Pediatric/Adolescent 06/03/1999, 10/25/2006, 01/03/2007    IPV 1991, 01/14/1992, 10/29/1992, 09/19/1996    Influenza - Quadrivalent - PF *Preferred* (6 months and older) 10/16/2020, 12/20/2021    MMR 10/29/1992, 09/19/1996, 01/13/2022    Meningococcal Conjugate (MCV4P) 01/03/2007    Rho (D) Immune Globulin 10/20/2021    Rho (D) Immune Globulin - IM 01/14/2022    Tdap 10/25/2006, 10/20/2021    Varicella 06/03/1999, 02/11/2009         Results for orders placed or performed in visit on 06/05/23   CBC Auto Differential   Result Value Ref Range    WBC 9.91 3.90 - 12.70 K/uL    RBC 4.62 4.00 - 5.40 M/uL    Hemoglobin 12.2 12.0 - 16.0 g/dL    Hematocrit 39.5 37.0 - 48.5 %    MCV 86 82 - 98 fL    MCH 26.4 (L) 27.0 - 31.0 pg    MCHC 30.9 (L) 32.0 - 36.0 g/dL    RDW 13.7 11.5 - 14.5 %    Platelets 339 150 - 450 K/uL    MPV 10.9 9.2 - 12.9 fL    Immature Granulocytes 0.2 0.0 - 0.5 %    Gran # (ANC) 6.7 1.8 - 7.7 K/uL    Immature Grans (Abs) 0.02 0.00 - 0.04 K/uL    Lymph # 2.4 1.0 - 4.8 K/uL    Mono # 0.6 0.3 - 1.0 K/uL    Eos # 0.1 0.0 - 0.5 K/uL    Baso # 0.04 0.00 - 0.20 K/uL    nRBC 0 0 /100 WBC    Gran % 67.9 38.0 - 73.0 %    Lymph % 24.4 18.0 - 48.0 %    Mono % 5.9 4.0 - 15.0 %    Eosinophil % 1.2 0.0 - 8.0 %    Basophil % 0.4 0.0 - 1.9 %    Differential Method Automated    Comprehensive Metabolic Panel   Result Value Ref Range    Sodium 139 136 - 145 mmol/L    Potassium 4.1 3.5 - 5.1 mmol/L    Chloride 107 95 - 110 mmol/L    CO2 24 23 - 29 mmol/L    Glucose 65 (L) 70 - 110 mg/dL    BUN 10 6 - 20 mg/dL    Creatinine 0.9 0.5 - 1.4 mg/dL    Calcium 9.3 8.7 - 10.5 mg/dL    Total Protein 6.9 6.0 - 8.4 g/dL    Albumin 3.7 3.5 - 5.2  "g/dL    Total Bilirubin 0.7 0.1 - 1.0 mg/dL    Alkaline Phosphatase 86 55 - 135 U/L    AST 15 10 - 40 U/L    ALT 9 (L) 10 - 44 U/L    Anion Gap 8 8 - 16 mmol/L    eGFR >60.0 >60 mL/min/1.73 m^2   Hemoglobin A1C   Result Value Ref Range    Hemoglobin A1C 5.0 4.0 - 5.6 %    Estimated Avg Glucose 97 68 - 131 mg/dL   Lipid Panel   Result Value Ref Range    Cholesterol 127 120 - 199 mg/dL    Triglycerides 63 30 - 150 mg/dL    HDL 33 (L) 40 - 75 mg/dL    LDL Cholesterol 81.4 63.0 - 159.0 mg/dL    HDL/Cholesterol Ratio 26.0 20.0 - 50.0 %    Total Cholesterol/HDL Ratio 3.8 2.0 - 5.0    Non-HDL Cholesterol 94 mg/dL   TSH   Result Value Ref Range    TSH 0.989 0.400 - 4.000 uIU/mL       Objective:     Vitals:    06/14/23 1441   BP: 104/70   BP Location: Left arm   Patient Position: Sitting   BP Method: Medium (Manual)   Pulse: 84   SpO2: 99%   Weight: 96.6 kg (212 lb 15.4 oz)   Height: 5' 7" (1.702 m)        Physical Exam  Vitals and nursing note reviewed.   Constitutional:       General: She is not in acute distress.     Appearance: She is well-developed. She is not ill-appearing or diaphoretic.      Comments: Visibly thinner   HENT:      Head: Normocephalic and atraumatic.   Cardiovascular:      Rate and Rhythm: Normal rate and regular rhythm.   Pulmonary:      Effort: Pulmonary effort is normal.      Breath sounds: Normal breath sounds.   Abdominal:      Palpations: Abdomen is soft.      Tenderness: There is no abdominal tenderness.   Musculoskeletal:         General: Tenderness present.      Comments: Tight and tender trapezius, R>L   Neurological:      Mental Status: She is alert and oriented to person, place, and time.      Cranial Nerves: No cranial nerve deficit.      Sensory: No sensory deficit.      Motor: No weakness or abnormal muscle tone.      Gait: Gait normal.      Comments: Strength and sensation grossly intract BL UE/LE  CN 2-12 grossly intact    Gait normal   Psychiatric:         Behavior: Behavior normal.   "       Thought Content: Thought content normal.         Judgment: Judgment normal.       Assessment:       1. Encounter for routine history and physical exam in female    2. Neck pain    3. Cervical disc disorder at C6-C7 level with radiculopathy    4. Cervicalgia    5. Recurrent major depressive disorder, in partial remission    6. Anxiety    7. Panic attack        Plan:       Continue wellbutrin for now  Add short term valium  Not a long term solution  Can be sedating, do not mix with zanaflex.     Refill zanaflex  PT when able   Oral diclofenac  Consider MRI if not better, has had conservative treatment x 6 months with no significant improvement and has gone to PT.     Ok to donate blood up to 3 times a year.     Weight loss, but in healthy ways      Encounter for routine history and physical exam in female    Neck pain  -     tiZANidine (ZANAFLEX) 4 MG tablet; Take 1 tablet (4 mg total) by mouth every evening.  Dispense: 90 tablet; Refill: 0  -     diclofenac (VOLTAREN) 50 MG EC tablet; Take 1 tablet (50 mg total) by mouth 2 (two) times daily.  Dispense: 60 tablet; Refill: 0  -     MRI Cervical Spine Without Contrast; Future; Expected date: 06/14/2023    Cervical disc disorder at C6-C7 level with radiculopathy  -     tiZANidine (ZANAFLEX) 4 MG tablet; Take 1 tablet (4 mg total) by mouth every evening.  Dispense: 90 tablet; Refill: 0  -     diclofenac (VOLTAREN) 50 MG EC tablet; Take 1 tablet (50 mg total) by mouth 2 (two) times daily.  Dispense: 60 tablet; Refill: 0  -     MRI Cervical Spine Without Contrast; Future; Expected date: 06/14/2023    Cervicalgia  -     tiZANidine (ZANAFLEX) 4 MG tablet; Take 1 tablet (4 mg total) by mouth every evening.  Dispense: 90 tablet; Refill: 0  -     diclofenac (VOLTAREN) 50 MG EC tablet; Take 1 tablet (50 mg total) by mouth 2 (two) times daily.  Dispense: 60 tablet; Refill: 0  -     MRI Cervical Spine Without Contrast; Future; Expected date: 06/14/2023    Recurrent major  depressive disorder, in partial remission  -     buPROPion (WELLBUTRIN XL) 300 MG 24 hr tablet; Take 1 tablet (300 mg total) by mouth once daily.  Dispense: 90 tablet; Refill: 1    Anxiety  -     buPROPion (WELLBUTRIN XL) 300 MG 24 hr tablet; Take 1 tablet (300 mg total) by mouth once daily.  Dispense: 90 tablet; Refill: 1    Panic attack  -     buPROPion (WELLBUTRIN XL) 300 MG 24 hr tablet; Take 1 tablet (300 mg total) by mouth once daily.  Dispense: 90 tablet; Refill: 1

## 2023-06-14 NOTE — PATIENT INSTRUCTIONS
Continue wellbutrin for now  Add short term valium  Not a long term solution  Can be sedating, do not mix with zanaflex.     Refill zanaflex  PT when able   Oral diclofenac  Consider MRI if not better, has had conservative treatment x 6 months with no significant improvement and has gone to PT.     Ok to donate blood up to 3 times a year.     Weight loss, but in healthy ways

## 2023-06-23 ENCOUNTER — HOSPITAL ENCOUNTER (OUTPATIENT)
Dept: RADIOLOGY | Facility: HOSPITAL | Age: 33
Discharge: HOME | End: 2023-06-23
Attending: FAMILY MEDICINE
Payer: MEDICAID

## 2023-06-23 DIAGNOSIS — M54.2 NECK PAIN: ICD-10-CM

## 2023-06-23 DIAGNOSIS — M50.123 CERVICAL DISC DISORDER AT C6-C7 LEVEL WITH RADICULOPATHY: ICD-10-CM

## 2023-06-23 DIAGNOSIS — M54.2 CERVICALGIA: ICD-10-CM

## 2023-06-23 PROCEDURE — 72141 MRI NECK SPINE W/O DYE: CPT | Mod: TC

## 2023-06-23 PROCEDURE — 72141 MRI NECK SPINE W/O DYE: CPT | Mod: 26,,, | Performed by: RADIOLOGY

## 2023-06-23 PROCEDURE — 72141 MRI CERVICAL SPINE WITHOUT CONTRAST: ICD-10-PCS | Mod: 26,,, | Performed by: RADIOLOGY

## 2023-08-10 RX ORDER — ACETAMINOPHEN AND CODEINE PHOSPHATE 120; 12 MG/5ML; MG/5ML
1 SOLUTION ORAL DAILY
Qty: 90 TABLET | Refills: 4 | Status: SHIPPED | OUTPATIENT
Start: 2023-08-10 | End: 2024-01-30

## 2024-01-30 ENCOUNTER — OFFICE VISIT (OUTPATIENT)
Dept: OBSTETRICS AND GYNECOLOGY | Facility: CLINIC | Age: 34
End: 2024-01-30
Payer: COMMERCIAL

## 2024-01-30 VITALS
BODY MASS INDEX: 34.05 KG/M2 | SYSTOLIC BLOOD PRESSURE: 100 MMHG | DIASTOLIC BLOOD PRESSURE: 62 MMHG | WEIGHT: 217.38 LBS

## 2024-01-30 DIAGNOSIS — Z01.419 WOMEN'S ANNUAL ROUTINE GYNECOLOGICAL EXAMINATION: Primary | ICD-10-CM

## 2024-01-30 DIAGNOSIS — Z30.017 ENCOUNTER FOR INITIAL PRESCRIPTION OF IMPLANTABLE SUBDERMAL CONTRACEPTIVE: ICD-10-CM

## 2024-01-30 DIAGNOSIS — Z80.3 FAMILY HISTORY OF BREAST CANCER IN MOTHER: ICD-10-CM

## 2024-01-30 DIAGNOSIS — Z12.39 SCREENING BREAST EXAMINATION: ICD-10-CM

## 2024-01-30 DIAGNOSIS — Z11.3 ENCOUNTER FOR SCREENING EXAMINATION FOR SEXUALLY TRANSMITTED DISEASE: ICD-10-CM

## 2024-01-30 PROCEDURE — 87491 CHLMYD TRACH DNA AMP PROBE: CPT | Performed by: PHYSICIAN ASSISTANT

## 2024-01-30 PROCEDURE — 1160F RVW MEDS BY RX/DR IN RCRD: CPT | Mod: CPTII,S$GLB,, | Performed by: PHYSICIAN ASSISTANT

## 2024-01-30 PROCEDURE — 99999 PR PBB SHADOW E&M-EST. PATIENT-LVL III: CPT | Mod: PBBFAC,,, | Performed by: PHYSICIAN ASSISTANT

## 2024-01-30 PROCEDURE — 3078F DIAST BP <80 MM HG: CPT | Mod: CPTII,S$GLB,, | Performed by: PHYSICIAN ASSISTANT

## 2024-01-30 PROCEDURE — 1159F MED LIST DOCD IN RCRD: CPT | Mod: CPTII,S$GLB,, | Performed by: PHYSICIAN ASSISTANT

## 2024-01-30 PROCEDURE — 3074F SYST BP LT 130 MM HG: CPT | Mod: CPTII,S$GLB,, | Performed by: PHYSICIAN ASSISTANT

## 2024-01-30 PROCEDURE — 81514 NFCT DS BV&VAGINITIS DNA ALG: CPT | Performed by: PHYSICIAN ASSISTANT

## 2024-01-30 PROCEDURE — 99395 PREV VISIT EST AGE 18-39: CPT | Mod: S$GLB,,, | Performed by: PHYSICIAN ASSISTANT

## 2024-01-30 PROCEDURE — 3008F BODY MASS INDEX DOCD: CPT | Mod: CPTII,S$GLB,, | Performed by: PHYSICIAN ASSISTANT

## 2024-01-30 NOTE — PROGRESS NOTES
HISTORY OF PRESENT ILLNESS:    Christianne Faria is a 33 y.o. female, , Patient's last menstrual period was 2024.,  presents for a routine exam. She uses pop for contraception. She does want STD screening.  No GYN complaints.    The patient participates in regular exercise: Yes.  The patient does not smoke.  The patient wears seatbelts.   Pt denies any domestic violence. Yes -  tattoos or blood transfusions    SCREENING:   Pap:  nilm - hpv  Mammogram: N/A  Colonoscopy: N/A   DEXA:  N/A     GYN FH:  Breast cancer: mother   Colon cancer: none  Ovarian cancer: none  Endometrial cancer: none    Past Medical History:   Diagnosis Date    Anxiety     Depression     Hx of psychiatric care     Mild intermittent asthma     Obsessive-compulsive disorder        Past Surgical History:   Procedure Laterality Date    BUNIONECTOMY Right 2009       MEDICATIONS AND ALLERGIES:      Current Outpatient Medications:     buPROPion (WELLBUTRIN XL) 300 MG 24 hr tablet, Take 1 tablet (300 mg total) by mouth once daily., Disp: 90 tablet, Rfl: 1    diazePAM (VALIUM) 2 MG tablet, Take 1 tablet (2 mg total) by mouth every 12 (twelve) hours as needed for Anxiety., Disp: 30 tablet, Rfl: 0    multivitamin capsule, Take 1 capsule by mouth once daily., Disp: , Rfl:     Review of patient's allergies indicates:  No Known Allergies    Social History     Socioeconomic History    Marital status: Single   Occupational History    Occupation:    Tobacco Use    Smoking status: Never    Smokeless tobacco: Never   Substance and Sexual Activity    Alcohol use: Yes     Alcohol/week: 1.0 standard drink of alcohol     Types: 1 Glasses of wine per week     Comment: 3 times a month.    Drug use: Yes     Types: Marijuana     Comment: Stopped 22    Sexual activity: Yes     Partners: Male     Birth control/protection: Abstinence, OCP, None     Comment: Haven't really been having sex much   Other Topics Concern    Patient feels they ought to  cut down on drinking/drug use No    Patient annoyed by others criticizing their drinking/drug use No    Patient has felt bad or guilty about drinking/drug use No    Patient has had a drink/used drugs as an eye opener in the AM No   Social History Narrative    4/22/2022: lives with her BF their daughter, and his parents. There are two cats and two dogs at home. She is back to work, 2 days/week. At [employer]. She is a .She is much happier with this new job. Her mother passed away due to breast cancer in mid 2021.      Social Determinants of Health     Financial Resource Strain: Low Risk  (12/6/2023)    Overall Financial Resource Strain (CARDIA)     Difficulty of Paying Living Expenses: Not very hard   Food Insecurity: No Food Insecurity (12/6/2023)    Hunger Vital Sign     Worried About Running Out of Food in the Last Year: Never true     Ran Out of Food in the Last Year: Never true   Transportation Needs: No Transportation Needs (12/6/2023)    PRAPARE - Transportation     Lack of Transportation (Medical): No     Lack of Transportation (Non-Medical): No   Physical Activity: Sufficiently Active (12/6/2023)    Exercise Vital Sign     Days of Exercise per Week: 3 days     Minutes of Exercise per Session: 60 min   Stress: No Stress Concern Present (12/6/2023)    [employer] - Occupational Stress Questionnaire     Feeling of Stress : Only a little   Social Connections: Unknown (12/6/2023)    Social Connection and Isolation Panel [NHANES]     Frequency of Communication with Friends and Family: Three times a week     Frequency of Social Gatherings with Friends and Family: Once a week     Active Member of Clubs or Organizations: No     Attends Club or Organization Meetings: Never     Marital Status:    Housing Stability: Low Risk  (12/6/2023)    Housing Stability Vital Sign     Unable to Pay for Housing in the Last Year: No     Number of Places Lived in the Last Year: 1      Unstable Housing in the Last Year: No       COMPREHENSIVE GYN HISTORY:    PAP History: Denies abnormal Paps.  Infection History: Denies STDs. Denies PID.  Benign History: Denies uterine fibroids. Denies ovarian cysts. Denies endometriosis. Denies other conditions.  Cancer History: Denies cervical cancer. Denies uterine cancer or hyperplasia. Denies ovarian cancer. Denies vulvar cancer or pre-cancer. Denies vaginal cancer or pre-cancer. Denies breast cancer. Denies colon cancer.  Sexual Activity History: Reports currently being sexually active  Menstrual History: Monthly, mild-moderate.  Contraception:oral progesterone-only contraceptive    ROS:  GENERAL: No weight changes. No swelling. No fatigue. No fever.  CARDIOVASCULAR: No chest pain. No shortness of breath. No leg cramps.   NEUROLOGICAL: No headaches. No vision changes.  BREASTS: No pain. No lumps. No discharge.  ABDOMEN: No pain. No nausea. No vomiting. No diarrhea. No constipation.  REPRODUCTIVE: No abnormal bleeding.   VULVA: No pain. No lesions. No itching.  VAGINA: No relaxation. No itching. No odor. No discharge. No lesions.  URINARY: No incontinence. No nocturia. No frequency. No dysuria.    /62   Wt 98.6 kg (217 lb 6 oz)   LMP 01/12/2024   BMI 34.05 kg/m²     PE:  APPEARANCE: Well nourished, well developed, in no acute distress.  AFFECT: WNL, alert and oriented x 3.  SKIN: No acne or hirsutism.  NECK: Neck symmetric, without masses or thyromegaly.  NODES: No inguinal, cervical, axillary or femoral lymph node enlargement.  CHEST: Good respiratory effort.   ABDOMEN: Soft. No tenderness or masses. No hepatosplenomegaly. No hernias.  BREASTS: Symmetrical, no skin changes, visible lesions, palpable masses or nipple discharge bilaterally.  PELVIC: External female genitalia without lesions.  Female hair distribution. Adequate perineal body, Normal urethral meatus. Vagina moist and well rugated without lesions or discharge.  No significant cystocele or  rectocele present. Cervix pink without lesions, discharge or tenderness. Uterus is normal size, regular, mobile and nontender. Adnexa without masses or tenderness.  EXTREMITIES: No edema      DIAGNOSIS:  1. Women's annual routine gynecological examination        2. Screening breast examination        3. Encounter for initial prescription of implantable subdermal contraceptive  Device Authorization Order      4. Family history of breast cancer in mother        5. Encounter for screening examination for sexually transmitted disease  Vaginosis Screen by DNA Probe    C. trachomatis/N. gonorrhoeae by AMP DNA [facility]; Cervicovaginal          PLAN:  - Pap due in 2 years.  - Screening tests as ordered.  - Diet and exercise encouraged.  Seat belt use encouraged.    Counseling: Contraception:nexplanon     FOLLOW-UP with me annually.   Nadine Cruz PA-C

## 2024-01-31 LAB
C TRACH DNA SPEC QL NAA+PROBE: NOT DETECTED
N GONORRHOEA DNA SPEC QL NAA+PROBE: NOT DETECTED

## 2024-02-07 ENCOUNTER — OFFICE VISIT (OUTPATIENT)
Dept: FAMILY MEDICINE | Facility: CLINIC | Age: 34
End: 2024-02-07
Payer: COMMERCIAL

## 2024-02-07 VITALS
HEART RATE: 95 BPM | BODY MASS INDEX: 34.16 KG/M2 | WEIGHT: 217.63 LBS | DIASTOLIC BLOOD PRESSURE: 60 MMHG | HEIGHT: 67 IN | OXYGEN SATURATION: 100 % | SYSTOLIC BLOOD PRESSURE: 104 MMHG | TEMPERATURE: 99 F

## 2024-02-07 DIAGNOSIS — F41.0 PANIC ATTACK: ICD-10-CM

## 2024-02-07 DIAGNOSIS — F41.9 ANXIETY: ICD-10-CM

## 2024-02-07 DIAGNOSIS — M54.2 CERVICALGIA: ICD-10-CM

## 2024-02-07 DIAGNOSIS — J32.9 RECURRENT SINUSITIS: ICD-10-CM

## 2024-02-07 DIAGNOSIS — F33.41 RECURRENT MAJOR DEPRESSIVE DISORDER, IN PARTIAL REMISSION: ICD-10-CM

## 2024-02-07 DIAGNOSIS — Z00.00 ENCOUNTER FOR ROUTINE HISTORY AND PHYSICAL EXAM IN FEMALE: ICD-10-CM

## 2024-02-07 PROCEDURE — 3074F SYST BP LT 130 MM HG: CPT | Mod: CPTII,S$GLB,, | Performed by: FAMILY MEDICINE

## 2024-02-07 PROCEDURE — 99214 OFFICE O/P EST MOD 30 MIN: CPT | Mod: S$GLB,,, | Performed by: FAMILY MEDICINE

## 2024-02-07 PROCEDURE — 3078F DIAST BP <80 MM HG: CPT | Mod: CPTII,S$GLB,, | Performed by: FAMILY MEDICINE

## 2024-02-07 PROCEDURE — 3008F BODY MASS INDEX DOCD: CPT | Mod: CPTII,S$GLB,, | Performed by: FAMILY MEDICINE

## 2024-02-07 PROCEDURE — 99999 PR PBB SHADOW E&M-EST. PATIENT-LVL IV: CPT | Mod: PBBFAC,,, | Performed by: FAMILY MEDICINE

## 2024-02-07 PROCEDURE — 1159F MED LIST DOCD IN RCRD: CPT | Mod: CPTII,S$GLB,, | Performed by: FAMILY MEDICINE

## 2024-02-07 RX ORDER — LIDOCAINE 50 MG/G
1 PATCH TOPICAL DAILY
Qty: 15 PATCH | Refills: 1 | Status: SHIPPED | OUTPATIENT
Start: 2024-02-07

## 2024-02-07 RX ORDER — CYCLOBENZAPRINE HCL 5 MG
5 TABLET ORAL NIGHTLY
Qty: 90 TABLET | Refills: 1 | Status: SHIPPED | OUTPATIENT
Start: 2024-02-07

## 2024-02-07 RX ORDER — AZELASTINE 1 MG/ML
1 SPRAY, METERED NASAL 2 TIMES DAILY
Qty: 30 ML | Refills: 3 | Status: SHIPPED | OUTPATIENT
Start: 2024-02-07 | End: 2025-02-06

## 2024-02-07 RX ORDER — FLUTICASONE PROPIONATE 50 MCG
2 SPRAY, SUSPENSION (ML) NASAL DAILY
Qty: 16 G | Refills: 12 | Status: SHIPPED | OUTPATIENT
Start: 2024-02-07 | End: 2024-03-08

## 2024-02-07 RX ORDER — BUPROPION HYDROCHLORIDE 300 MG/1
300 TABLET ORAL DAILY
Qty: 90 TABLET | Refills: 1 | Status: SHIPPED | OUTPATIENT
Start: 2024-02-07 | End: 2025-02-06

## 2024-02-07 NOTE — PATIENT INSTRUCTIONS
Continue wellbutrin  Consider buspar?  Try flexeril and PT    Flonase  Astellin  Saline nasal spray nightly  If not better, ENT    F/u 6 months, labs prior.

## 2024-02-07 NOTE — PROGRESS NOTES
Subjective:       Patient ID: Christianne Faria is a 33 y.o. female.    Chief Complaint: Anxiety    33 year old female presents today for a f/u    Anxiety: Taking wellbutrin 300 mg. Didn't tolerate SSRI in the past. Some more anxiety recently, especially with school. Had discussion, offered buspar vs valium vs low dose SSRI. At the last visit, short term valium was given. That helped. No panic attacks. Less arguments. Less avoidant behavior. 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 However, massaged help temporarily. Muscle relaxers didn't help. Neither did nsaids.     Has sinus issues, ongoing. Has had facial pain with sharp pain 4 times in the last few months. She doesn't know any triggers or what resolves symptoms. Nasal sprays didn't help. Has sore throat associated with this.     Anxiety  Presents for follow-up visit. Patient reports no chest pain, confusion, nervous/anxious behavior, obsessions, palpitations, panic or shortness of breath. The severity of symptoms is mild. The quality of sleep is good.     Compliance with medications is %.     Review of Systems   Constitutional:  Negative for activity change and unexpected weight change.   HENT:  Negative for rhinorrhea and trouble swallowing.    Eyes:  Negative for discharge and visual disturbance.   Respiratory:  Negative for chest tightness, shortness of breath and wheezing.    Cardiovascular:  Negative for chest pain and palpitations.   Gastrointestinal:  Negative for blood in stool, constipation, diarrhea and vomiting.   Endocrine: Negative for polydipsia and polyuria.   Genitourinary:  Negative for difficulty urinating, dysuria, hematuria and menstrual problem.   Musculoskeletal:  Positive for joint swelling and neck pain.   Neurological:  Negative for headaches.   Psychiatric/Behavioral:  Negative for confusion and dysphoric  "mood. The patient is not nervous/anxious.              Objective:     Vitals:    02/07/24 1318   BP: 104/60   Pulse: 95   Temp: 98.5 °F (36.9 °C)   TempSrc: Oral   SpO2: 100%   Weight: 98.7 kg (217 lb 9.5 oz)   Height: 5' 7" (1.702 m)        Physical Exam  Vitals and nursing note reviewed.   Constitutional:       General: She is not in acute distress.     Appearance: She is obese. She is not ill-appearing, toxic-appearing or diaphoretic.   HENT:      Nose: No congestion or rhinorrhea.      Mouth/Throat:      Pharynx: No oropharyngeal exudate or posterior oropharyngeal erythema.   Cardiovascular:      Rate and Rhythm: Normal rate and regular rhythm.   Pulmonary:      Effort: Pulmonary effort is normal.      Breath sounds: Normal breath sounds.   Abdominal:      Palpations: Abdomen is soft.      Tenderness: There is no abdominal tenderness.   Musculoskeletal:         General: No swelling.   Neurological:      General: No focal deficit present.      Mental Status: She is alert.   Psychiatric:         Mood and Affect: Mood normal.         Behavior: Behavior normal.         Thought Content: Thought content normal.         Judgment: Judgment normal.         Assessment:       1. BMI 34.0-34.9,adult    2. Recurrent major depressive disorder, in partial remission    3. Anxiety    4. Panic attack    5. Recurrent sinusitis    6. Cervicalgia    7. Encounter for routine history and physical exam in female        Plan:         Continue wellbutrin  Consider buspar?  Try flexeril and PT    Flonase  Astellin  Saline nasal spray nightly  If not better, ENT    F/u 6 months, labs prior.     BMI 34.0-34.9,adult    Recurrent major depressive disorder, in partial remission  -     buPROPion (WELLBUTRIN XL) 300 MG 24 hr tablet; Take 1 tablet (300 mg total) by mouth once daily.  Dispense: 90 tablet; Refill: 1    Anxiety  -     buPROPion (WELLBUTRIN XL) 300 MG 24 hr tablet; Take 1 tablet (300 mg total) by mouth once daily.  Dispense: 90 " tablet; Refill: 1  -     CBC Auto Differential; Future; Expected date: 02/07/2024  -     Comprehensive Metabolic Panel; Future; Expected date: 02/07/2024  -     TSH; Future; Expected date: 02/07/2024    Panic attack  -     buPROPion (WELLBUTRIN XL) 300 MG 24 hr tablet; Take 1 tablet (300 mg total) by mouth once daily.  Dispense: 90 tablet; Refill: 1    Recurrent sinusitis  -     Ambulatory referral/consult to ENT; Future; Expected date: 02/14/2024  -     fluticasone propionate (FLONASE) 50 mcg/actuation nasal spray; 2 sprays (100 mcg total) by Each Nostril route once daily.  Dispense: 16 g; Refill: 12  -     azelastine (ASTELIN) 137 mcg (0.1 %) nasal spray; 1 spray (137 mcg total) by Nasal route 2 (two) times daily.  Dispense: 30 mL; Refill: 3    Cervicalgia  -     Ambulatory referral/consult to Physical/Occupational Therapy; Future; Expected date: 02/14/2024  -     LIDOcaine (LIDODERM) 5 %; Place 1 patch onto the skin once daily. Remove & Discard patch within 12 hours or as directed by MD  Dispense: 15 patch; Refill: 1  -     cyclobenzaprine (FLEXERIL) 5 MG tablet; Take 1 tablet (5 mg total) by mouth nightly. Can double to 2 pills if needed nightly.  Dispense: 90 tablet; Refill: 1    Encounter for routine history and physical exam in female  -     CBC Auto Differential; Future; Expected date: 02/07/2024  -     Comprehensive Metabolic Panel; Future; Expected date: 02/07/2024  -     Hemoglobin A1C; Future; Expected date: 02/07/2024  -     Lipid Panel; Future; Expected date: 02/07/2024  -     TSH; Future; Expected date: 02/07/2024            Warning signs discussed, patient to call with any further issues or worsening of symptoms. Above note may have utilized dictation, please excuse any transcription errors.

## 2024-02-08 ENCOUNTER — TELEPHONE (OUTPATIENT)
Dept: FAMILY MEDICINE | Facility: CLINIC | Age: 34
End: 2024-02-08
Payer: COMMERCIAL

## 2024-03-06 ENCOUNTER — PROCEDURE VISIT (OUTPATIENT)
Dept: OBSTETRICS AND GYNECOLOGY | Facility: CLINIC | Age: 34
End: 2024-03-06
Payer: COMMERCIAL

## 2024-03-06 VITALS
DIASTOLIC BLOOD PRESSURE: 64 MMHG | SYSTOLIC BLOOD PRESSURE: 100 MMHG | WEIGHT: 210.31 LBS | BODY MASS INDEX: 32.94 KG/M2

## 2024-03-06 DIAGNOSIS — Z97.5 NEXPLANON IN PLACE: ICD-10-CM

## 2024-03-06 DIAGNOSIS — Z30.017 NEXPLANON INSERTION: Primary | ICD-10-CM

## 2024-03-06 LAB
B-HCG UR QL: NEGATIVE
CTP QC/QA: YES

## 2024-03-06 PROCEDURE — 11981 INSERTION DRUG DLVR IMPLANT: CPT | Mod: S$GLB,,, | Performed by: PHYSICIAN ASSISTANT

## 2024-03-06 PROCEDURE — 81025 URINE PREGNANCY TEST: CPT | Mod: S$GLB,,, | Performed by: PHYSICIAN ASSISTANT

## 2024-03-06 PROCEDURE — 99499 UNLISTED E&M SERVICE: CPT | Mod: S$GLB,,, | Performed by: PHYSICIAN ASSISTANT

## 2024-03-06 NOTE — PROCEDURES
Insertion of Nexplanon    Date/Time: 3/6/2024 10:00 AM    Performed by: Nadine Cruz PA-C  Authorized by: Nadine Cruz PA-C    Consent:   Consent given by:  Patient  Patient questions answered: yes    Patient agrees, verbalizes understanding, and wants to proceed: yes    Pre-Procedure:   Prepped with: alcohol 70% and chlorhexidine gluconate    Local anesthetic:  Lidocaine with epinephrine  The site was cleaned and prepped in a sterile fashion: yes    Insertion Procedure:   Small stab incision was made in arm: yes    Left/right:  left arm   68 mg etonogestreL 68 mg  Preloaded Implanon trocar was placed subdermally: yes    Visualization of implant was obtained: yes    Nexplanon was inserted and trocar removed: yes    Visualization of notch in stilette and palpitation of device: yes    Palpitation confirms placement by provider and patient: yes    Site was closed with steri-strips and pressure bandage applied: yes

## 2024-05-27 ENCOUNTER — OFFICE VISIT (OUTPATIENT)
Dept: OTOLARYNGOLOGY | Facility: CLINIC | Age: 34
End: 2024-05-27
Payer: COMMERCIAL

## 2024-05-27 VITALS
DIASTOLIC BLOOD PRESSURE: 66 MMHG | WEIGHT: 210.31 LBS | SYSTOLIC BLOOD PRESSURE: 104 MMHG | BODY MASS INDEX: 33.01 KG/M2 | HEIGHT: 67 IN

## 2024-05-27 DIAGNOSIS — J32.9 RECURRENT SINUSITIS: ICD-10-CM

## 2024-05-27 DIAGNOSIS — J30.2 SEASONAL ALLERGIC RHINITIS, UNSPECIFIED TRIGGER: Primary | ICD-10-CM

## 2024-05-27 DIAGNOSIS — J39.2 THROAT IRRITATION: ICD-10-CM

## 2024-05-27 PROCEDURE — 3078F DIAST BP <80 MM HG: CPT | Mod: CPTII,S$GLB,, | Performed by: NURSE PRACTITIONER

## 2024-05-27 PROCEDURE — 3074F SYST BP LT 130 MM HG: CPT | Mod: CPTII,S$GLB,, | Performed by: NURSE PRACTITIONER

## 2024-05-27 PROCEDURE — 1159F MED LIST DOCD IN RCRD: CPT | Mod: CPTII,S$GLB,, | Performed by: NURSE PRACTITIONER

## 2024-05-27 PROCEDURE — 99203 OFFICE O/P NEW LOW 30 MIN: CPT | Mod: 25,S$GLB,, | Performed by: NURSE PRACTITIONER

## 2024-05-27 PROCEDURE — 31575 DIAGNOSTIC LARYNGOSCOPY: CPT | Mod: S$GLB,,, | Performed by: NURSE PRACTITIONER

## 2024-05-27 PROCEDURE — 3008F BODY MASS INDEX DOCD: CPT | Mod: CPTII,S$GLB,, | Performed by: NURSE PRACTITIONER

## 2024-05-27 RX ORDER — FLUTICASONE PROPIONATE 50 MCG
1 SPRAY, SUSPENSION (ML) NASAL 2 TIMES DAILY
Qty: 18.2 ML | Refills: 3 | Status: SHIPPED | OUTPATIENT
Start: 2024-05-27

## 2024-05-27 RX ORDER — AZELASTINE 1 MG/ML
1 SPRAY, METERED NASAL 2 TIMES DAILY
Qty: 30 ML | Refills: 3 | Status: SHIPPED | OUTPATIENT
Start: 2024-05-27

## 2024-05-27 NOTE — PROGRESS NOTES
OTOLARYNGOLOGY CLINIC NOTE  Date:  05/27/2024     Chief complaint:  Chief Complaint   Patient presents with    Nasal Congestion    Epistaxis    Other     Feels like something keep poking her in the back of her sinus primary the right side more for a couple months now     History of Present Illness  Christianne Faria is a 33 y.o. female  presenting today for a new evaluation and treatment of upper respiratory symptoms.  Pt reports for several months feeling like something is sticking her in the back of her throat.  Pt reports its in the upper part which she thinks is her sinuses.  Pt reports it hurts when she feels it.  Pt reports having nasal congestion and post nasal drip.  Pt denies any rhinitis.  Pt reports having occasional nose bleeds when her nose feels dry.  Pt rpeorts she has been using Flonase by it self.  Pt denies using any saline.  Pt denies any allergy testing, history of sinus surgery or nasal fractures.      Review of medical records and prior documentation  Past medical records were reviewed with data pertinent to the chief complaint summarized in the HPI. Information obtained from review of medical records is attributed to respective sources in the HPI with reference to sources of information at their mention. Records reviewed included all recent notes from referring provider, primary care, and related subspecialty evaluations as available. This review of records was performed and additional data obtained to supplement history obtained from the patient and further inform medical decision making involved in formulating a plan of care accounting for all history and treatment relevant to the issues addressed.    Past Medical History  Past Medical History:   Diagnosis Date    Anxiety     Depression     Hx of psychiatric care     Mild intermittent asthma     Obsessive-compulsive disorder       Past Surgical History  Past Surgical History:   Procedure Laterality Date    BUNIONECTOMY Right 2009       Medications  Current Outpatient Medications on File Prior to Visit   Medication Sig Dispense Refill    buPROPion (WELLBUTRIN XL) 300 MG 24 hr tablet Take 1 tablet (300 mg total) by mouth once daily. 90 tablet 1    cyclobenzaprine (FLEXERIL) 5 MG tablet Take 1 tablet (5 mg total) by mouth nightly. Can double to 2 pills if needed nightly. 90 tablet 1    multivit-folic acid-zinc-vit C 400- mcg-mg-mg Cap Take 1 capsule by mouth once daily.      multivitamin capsule Take 1 capsule by mouth once daily.      witch hazeL 50 % PadM Apply 1 each topically.      azelastine (ASTELIN) 137 mcg (0.1 %) nasal spray 1 spray (137 mcg total) by Nasal route 2 (two) times daily. 30 mL 3    LIDOcaine (LIDODERM) 5 % Place 1 patch onto the skin once daily. Remove & Discard patch within 12 hours or as directed by MD 15 patch 1     Current Facility-Administered Medications on File Prior to Visit   Medication Dose Route Frequency Provider Last Rate Last Admin    etonogestreL subdermal device 68 mg  68 mg Implant  Nadine Cruz PA-C   68 mg at 03/06/24 1000     Review of Systems  Review of Systems   Constitutional:  Positive for malaise/fatigue.   HENT:  Positive for ear pain, hearing loss, nosebleeds and sore throat.    Eyes: Negative.    Respiratory:  Positive for cough.    Cardiovascular:  Positive for chest pain.   Gastrointestinal:  Positive for constipation and diarrhea.   Genitourinary: Negative.    Musculoskeletal:  Positive for neck pain.   Skin:  Positive for rash.   Neurological:  Positive for dizziness.   Psychiatric/Behavioral:  The patient is nervous/anxious.       Social History   reports that she has quit smoking. Her smoking use included vaping w/o nicotine. She has never used smokeless tobacco. She reports current alcohol use of about 1.0 standard drink of alcohol per week. She reports current drug use. Drug: Marijuana.     Family History  Family History   Problem Relation Name Age of Onset    Anxiety disorder  "Mother Vanessa     Breast cancer Mother Vanessa 67        5 months between diagnosis and passing    Glaucoma Mother Vanessa     Cancer Mother Vanessa         Breast cancer    Hypertension Father Elie     Depression Brother Angelo     Asthma Brother Angelo     Diabetes Sister Ivan Roman     Stroke Paternal Grandmother Yesenia         x 2, latest in 2019    Colon cancer Neg Hx      Ovarian cancer Neg Hx      Amblyopia Neg Hx      Blindness Neg Hx      Cataracts Neg Hx      Macular degeneration Neg Hx      Retinal detachment Neg Hx      Strabismus Neg Hx        Physical Exam   Vitals:    05/27/24 0803   BP: 104/66    Body mass index is 32.94 kg/m².  Weight: 95.4 kg (210 lb 5.1 oz)   Height: 5' 7" (170.2 cm)     GENERAL: no acute distress.  HEAD: normocephalic.   EYES: lids and lashes normal. No scleral icterus  EARS: external ear without lesion, normal pinna shape and position.  External auditory canal with normal cerumen, tympanic membrane fully visible, no perforation , no retraction. No middle ear effusion. Ossicles intact.   NOSE: external nose without significant bony abnormality  ORAL CAVITY/OROPHARYNX: tongue midline and mobile. Symmetric palate rise. Uvula midline.   NECK: trachea midline.   LYMPH NODES:No cervical lymphadenopathy.  RESPIRATORY: no stridor, no stertor. Voice normal. Respirations nonlabored.  NEURO: alert, responds to questions appropriately.   Cranial nerve exam as indicated in above sections and additionally showed facial movement symmetric with good eye closure and symmetric smile.   PSYCH:mood appropriate    PROCEDURE NOTE  NAME OF PROCEDURE: Flexible Laryngoscopy, diagnostic  INDICATIONS: gag reflex precludes mirror exam, throat irritation and sticking feeling.  FINDINGS:   Consent: After procedure was explained in detail and all questions answered, verbal consent was obtained for performing flexible laryngoscopy.  Anesthesia: topical 4% lidocaine and neosynephrine  Procedure: With " patient in seated position, the scope was inserted into the bilateral nasal passageway and advanced atraumatically into the nasopharynx to examine the following structures:  Nasal cavity: Turbinates with mild hypertrophy. no middle meatal edema. No purulent drainage.   Nasopharynx: no mass or lesion noted in nasopharynx.   Oropharynx: base of tongue without  mass or ulceration. Lingual tonsils normal in appearance  Hypopharynx: posterior pharyngeal wall without mass or lesion. No pooling of secretions. Pyriform sinuses visible without mass or lesion  Larynx: epiglottis normal without lesion. False vocal folds without edema/erythema/lesion. True vocal folds mobile and without lesion. Mild interarytenoid edema no erythema . Postcricoid region with mild edema no lesion   Subglottis: visualized portion of subglottis normal in appearance    After examination performed, the scope was removed atraumatically . The patient tolerated the procedure well. Photodocumentation obtained with representative images below, all images and/or videos uploaded in media section of epic.     Imaging:  The patient does not have any pertinent and/or recent imaging of the head and neck.     Labs:  CBC  Recent Labs   Lab 04/22/22  1109 07/13/22  1221 06/05/23  1320   WBC 8.47 8.77 9.91   Hemoglobin 12.5 13.3 12.2   Hematocrit 39.9 39.8 39.5   MCV 88 83 86   Platelets 317 305 339     BMP  Recent Labs   Lab 04/22/22  1109 07/13/22  1221 06/05/23  1320   Glucose 84 102 65 L   Sodium 140 136 139   Potassium 4.8 4.7 4.1   Chloride 107 107 107   CO2 27 22 L 24   BUN 15 15 10   Creatinine 0.9 0.9 0.9   Calcium 9.5 9.3 9.3     Assessment  1. Recurrent sinusitis  - Ambulatory referral/consult to ENT    2. Seasonal allergic rhinitis, unspecified trigger  - azelastine (ASTELIN) 137 mcg (0.1 %) nasal spray; 1 spray (137 mcg total) by Nasal route 2 (two) times daily.  Dispense: 30 mL; Refill: 3  - fluticasone propionate (FLONASE) 50 mcg/actuation nasal  spray; 1 spray (50 mcg total) by Each Nostril route 2 (two) times daily.  Dispense: 18.2 mL; Refill: 3    3. Throat irritation     Plan:  Allergic rhinitis(AR):  discussed about pathophysiology of allergic disease and sinus disease. We discussed about treatment options for this including but not limited to medications and potential surgeries as well as when surgery is indicated.  - I recommend dual nasal spray therapy as combo of steroid and antihistamine nasal spray has been shown in evidence-based studies to be better than either alone.   -Discussed medication administration technique (point toward outer corner of eye and not towards nasal septum) and use nasal saline prior to medication sprays.   -We discussed importance of saline use prior to medication sprays to help sprays work better and to clean the nose.   -Counseled on risk of bleeding, risk of increased intraocular pressure/cataract with long term use.   -Discussed how to increase and decrease nasal spray regimen based on symptoms and that sprays can be used on prn basis but work best when used daily and take about 2-3 weeks to get to max level. If patient using sprays on a prn basis and symptoms not controlled should go back to daily /bid use  -discussed as well as gave patient physical documentation with photos about the saline and other medication sprays (paperwork summarized discussion topics)  Throat irritation:  Pt advised to start nasal spray regimen which should decrease pnd and stop irritation f/u in 2 months and prn.    Discussed plan of care with patient in detail and all questions answered. Patient reported understanding of plan of care.

## 2024-05-27 NOTE — PATIENT INSTRUCTIONS
"Information and instructions from your visit with me today:    Start using the following medication nasal sprays:   Fluticasone spray:    This medication is a steroid spray. It stays within the nose and does not have absorption into the body that leads to side effects that one has with oral steroid medication. Fluticasone nasal spray is the same as the Flonase brand nasal spray. Discuss with your pharmacist if the price is lower over the counter or with a prescription ( this varies depending on insurance). The medication that is over the counter is the same as the prescription medication. Use this medication as instructed on the prescription, 1-2 sprays on each side of your nose twice daily.     Azelastine  spray:  This medication is an antihistamine used to treat nasal symptoms of allergy, which works specifically in the nose unlike antihistamine pills which have more of an effect on the whole body. Use this medication as instructed on the prescription, 1 spray on each side of your nose twice daily.     Additional instructions for medication sprays  Place the tip of the medication bottle in your nose and aim slightly up and out on each side to get medication high and deep into your nose and sinuses, and not have it all deposit in the very front of your nose. Aim the tip of the nozzle towards the outer corner of your eye . You can imagine aiming towards the back of your eyeball on each side for this, as opposed to straight back to the center of your nose and head.     You need to use this medication every day regardless of symptoms, as it takes time ( a few weeks) to work and get the benefits. It does not work on an "as needed" basis like taking a decongestant. If your symptoms only occur in a particular season, then the medication can be used seasonally instead of year long. For seasonal symptoms, you should start using the spray twice daily a month before when you normally have symptoms ( for example, if symptoms " start in August, should start at the end of June).     NASAL SALINE SPRAY ( simply saline and arm and hammer are examples) There are several different brands found in the cold and flu aisle of the pharmacy. You can use any brand of saline spray - this will deliver the saline by a gentle mist ( if you have difficulty or discomfort with nasal rinse/ a lot of fluid in the nose, this will be more comfortable).       Always rinse your nose with saline prior to using medication sprays and wait a couple of hours before using again. You can use the saline throughout the day to help with stuffy nose or dry nose.    Do not use nasal decongestant sprays such as Afrin or similar products long term ( over 3 days) .  This can cause long term physical nasal addiction. Afrin should only be used if having nose bleeds, severe nasal congestion , or severe ear pain/fullness and should not be used for more than 2-3 days in a row . It is a not a medication that should be used for a long period of time.     It was nice meeting you today, and I look forward to helping you feel better soon. Please don't hesitate to call if you have any other questions or concerns, or if I can be of any assistance in the meantime.          BELLA Brown, FNP-C  Otorhinolaryngology

## 2024-07-10 ENCOUNTER — E-VISIT (OUTPATIENT)
Dept: FAMILY MEDICINE | Facility: CLINIC | Age: 34
End: 2024-07-10
Payer: COMMERCIAL

## 2024-07-10 DIAGNOSIS — G47.00 INSOMNIA, UNSPECIFIED TYPE: Primary | ICD-10-CM

## 2024-07-10 RX ORDER — TRAZODONE HYDROCHLORIDE 50 MG/1
50 TABLET ORAL NIGHTLY
Qty: 90 TABLET | Refills: 1 | Status: SHIPPED | OUTPATIENT
Start: 2024-07-10 | End: 2025-07-10

## 2024-07-10 NOTE — PROGRESS NOTES
Patient ID: Christianne Faria is a 33 y.o. female.    Chief Complaint: Insomnia    The patient initiated a request through [facility] on 7/10/2024 for evaluation and management with a chief complaint of Insomnia     I evaluated the questionnaire submission on 7/10/2024.    [facility]    7/10/2024  3:59 AM CDT - Filed by Patient   Do you agree to participate in an E-Visit? Yes   If you have any of the following symptoms, please present to your local emergency room or call 911:  I acknowledge   Are you pregnant, could you be pregnant, or are you breast feeding? None of the above   What is the main issue you would like addressed today? Insomnia   Please describe your symptoms Unable to fall back to sleep.   Where is your problem located? Brain?   How severe are your symptoms? Severe   Have you had these symptoms before? No   How long have you been having these symptoms? For more than a month   Please list any medications or treatments you have used for your condition and indicate if it was effective or not. Melatonin gave me an extra 2 hours of sleep   What makes this feel better? I am able to get 7-8 hours of sleep if i go back to bed around 4-5am and sleep until 9am. But this isn't possible to do on workdays   What makes this feel worse? Stress   Are these symptoms related to a condition that you currently have? I am not sure   What is the condition?    When were you last seen for this condition?    Please describe any probable cause for these symptoms Insomnia started in April after a stressful period that lasted 4 weeks. I sleep an average of 3 hours a night. I thought it could be my bladder but nothing changed when I stopped water before bed.   Provide any additional information you feel is important. I don't have any trouble falling asleep. I just wake up between [id number] at night and cannot get back to sleep. The quality of my sleep is so bad that it is affecting my work&life. I am irritable, exhausted,  and I am unable to focus during the day.   Please attach any relevant images or files    Are you able to take your vital signs? Yes   Systolic Blood Pressure: 105   Diastolic Blood Pressure: 75   Weight: 210   Height: 66   Pulse: 72   Temperature: 98.6   Respiration rate:    Pulse Oxygen:          Encounter Diagnosis   Name Primary?    Insomnia, unspecified type Yes        No orders of the defined types were placed in this encounter.     Medications Ordered This Encounter   Medications    traZODone (DESYREL) 50 MG tablet     Sig: Take 1 tablet (50 mg total) by mouth every evening.     Dispense:  90 tablet     Refill:  1        No follow-ups on file.      E-Visit Time Tracking:    Day 1 Time (in minutes): 8    Total Time (in minutes): 8    Consider adding elavil or Buspar?

## 2024-08-12 ENCOUNTER — LAB VISIT (OUTPATIENT)
Dept: LAB | Facility: HOSPITAL | Age: 34
End: 2024-08-12
Attending: FAMILY MEDICINE
Payer: COMMERCIAL

## 2024-08-12 DIAGNOSIS — F41.9 ANXIETY: ICD-10-CM

## 2024-08-12 DIAGNOSIS — Z00.00 ENCOUNTER FOR ROUTINE HISTORY AND PHYSICAL EXAM IN FEMALE: ICD-10-CM

## 2024-08-12 LAB
ALBUMIN SERPL BCP-MCNC: 3.5 G/DL (ref 3.5–5.2)
ALP SERPL-CCNC: 78 U/L (ref 55–135)
ALT SERPL W/O P-5'-P-CCNC: 10 U/L (ref 10–44)
ANION GAP SERPL CALC-SCNC: 6 MMOL/L (ref 8–16)
AST SERPL-CCNC: 14 U/L (ref 10–40)
BASOPHILS # BLD AUTO: 0.06 K/UL (ref 0–0.2)
BASOPHILS NFR BLD: 0.7 % (ref 0–1.9)
BILIRUB SERPL-MCNC: 0.4 MG/DL (ref 0.1–1)
BUN SERPL-MCNC: 13 MG/DL (ref 6–20)
CALCIUM SERPL-MCNC: 8.9 MG/DL (ref 8.7–10.5)
CHLORIDE SERPL-SCNC: 111 MMOL/L (ref 95–110)
CHOLEST SERPL-MCNC: 129 MG/DL (ref 120–199)
CHOLEST/HDLC SERPL: 4.4 {RATIO} (ref 2–5)
CO2 SERPL-SCNC: 22 MMOL/L (ref 23–29)
CREAT SERPL-MCNC: 0.9 MG/DL (ref 0.5–1.4)
DIFFERENTIAL METHOD BLD: ABNORMAL
EOSINOPHIL # BLD AUTO: 0.2 K/UL (ref 0–0.5)
EOSINOPHIL NFR BLD: 2 % (ref 0–8)
ERYTHROCYTE [DISTWIDTH] IN BLOOD BY AUTOMATED COUNT: 13.4 % (ref 11.5–14.5)
EST. GFR  (NO RACE VARIABLE): >60 ML/MIN/1.73 M^2
ESTIMATED AVG GLUCOSE: 103 MG/DL (ref 68–131)
GLUCOSE SERPL-MCNC: 76 MG/DL (ref 70–110)
HBA1C MFR BLD: 5.2 % (ref 4–5.6)
HCT VFR BLD AUTO: 38.5 % (ref 37–48.5)
HDLC SERPL-MCNC: 29 MG/DL (ref 40–75)
HDLC SERPL: 22.5 % (ref 20–50)
HGB BLD-MCNC: 12.1 G/DL (ref 12–16)
IMM GRANULOCYTES # BLD AUTO: 0.02 K/UL (ref 0–0.04)
IMM GRANULOCYTES NFR BLD AUTO: 0.2 % (ref 0–0.5)
LDLC SERPL CALC-MCNC: 75 MG/DL (ref 63–159)
LYMPHOCYTES # BLD AUTO: 2.2 K/UL (ref 1–4.8)
LYMPHOCYTES NFR BLD: 25.1 % (ref 18–48)
MCH RBC QN AUTO: 26.9 PG (ref 27–31)
MCHC RBC AUTO-ENTMCNC: 31.4 G/DL (ref 32–36)
MCV RBC AUTO: 86 FL (ref 82–98)
MONOCYTES # BLD AUTO: 0.6 K/UL (ref 0.3–1)
MONOCYTES NFR BLD: 6.9 % (ref 4–15)
NEUTROPHILS # BLD AUTO: 5.6 K/UL (ref 1.8–7.7)
NEUTROPHILS NFR BLD: 65.1 % (ref 38–73)
NONHDLC SERPL-MCNC: 100 MG/DL
NRBC BLD-RTO: 0 /100 WBC
PLATELET # BLD AUTO: 330 K/UL (ref 150–450)
PMV BLD AUTO: 10.9 FL (ref 9.2–12.9)
POTASSIUM SERPL-SCNC: 4.3 MMOL/L (ref 3.5–5.1)
PROT SERPL-MCNC: 6.7 G/DL (ref 6–8.4)
RBC # BLD AUTO: 4.5 M/UL (ref 4–5.4)
SODIUM SERPL-SCNC: 139 MMOL/L (ref 136–145)
TRIGL SERPL-MCNC: 125 MG/DL (ref 30–150)
TSH SERPL DL<=0.005 MIU/L-ACNC: 1.18 UIU/ML (ref 0.4–4)
WBC # BLD AUTO: 8.67 K/UL (ref 3.9–12.7)

## 2024-08-12 PROCEDURE — 83036 HEMOGLOBIN GLYCOSYLATED A1C: CPT | Performed by: FAMILY MEDICINE

## 2024-08-12 PROCEDURE — 85025 COMPLETE CBC W/AUTO DIFF WBC: CPT | Performed by: FAMILY MEDICINE

## 2024-08-12 PROCEDURE — 84443 ASSAY THYROID STIM HORMONE: CPT | Performed by: FAMILY MEDICINE

## 2024-08-12 PROCEDURE — 80061 LIPID PANEL: CPT | Performed by: FAMILY MEDICINE

## 2024-08-12 PROCEDURE — 80053 COMPREHEN METABOLIC PANEL: CPT | Performed by: FAMILY MEDICINE

## 2024-08-12 PROCEDURE — 36415 COLL VENOUS BLD VENIPUNCTURE: CPT | Mod: PO | Performed by: FAMILY MEDICINE

## 2024-08-14 ENCOUNTER — LAB VISIT (OUTPATIENT)
Dept: LAB | Facility: HOSPITAL | Age: 34
End: 2024-08-14
Attending: FAMILY MEDICINE
Payer: COMMERCIAL

## 2024-08-14 ENCOUNTER — OFFICE VISIT (OUTPATIENT)
Dept: OTOLARYNGOLOGY | Facility: CLINIC | Age: 34
End: 2024-08-14
Payer: COMMERCIAL

## 2024-08-14 ENCOUNTER — OFFICE VISIT (OUTPATIENT)
Dept: FAMILY MEDICINE | Facility: CLINIC | Age: 34
End: 2024-08-14
Payer: COMMERCIAL

## 2024-08-14 VITALS
HEART RATE: 89 BPM | OXYGEN SATURATION: 98 % | HEIGHT: 67 IN | DIASTOLIC BLOOD PRESSURE: 65 MMHG | TEMPERATURE: 98 F | SYSTOLIC BLOOD PRESSURE: 90 MMHG | WEIGHT: 211.63 LBS | BODY MASS INDEX: 33.21 KG/M2

## 2024-08-14 VITALS
WEIGHT: 210.31 LBS | BODY MASS INDEX: 33.01 KG/M2 | HEIGHT: 67 IN | DIASTOLIC BLOOD PRESSURE: 76 MMHG | SYSTOLIC BLOOD PRESSURE: 138 MMHG

## 2024-08-14 DIAGNOSIS — Z00.00 ENCOUNTER FOR ROUTINE HISTORY AND PHYSICAL EXAM IN FEMALE: Primary | ICD-10-CM

## 2024-08-14 DIAGNOSIS — N94.10 DYSPAREUNIA IN FEMALE: ICD-10-CM

## 2024-08-14 DIAGNOSIS — J39.2 THROAT IRRITATION: ICD-10-CM

## 2024-08-14 DIAGNOSIS — F41.9 ANXIETY: ICD-10-CM

## 2024-08-14 DIAGNOSIS — R35.1 NOCTURIA: ICD-10-CM

## 2024-08-14 DIAGNOSIS — F41.0 PANIC ATTACK: ICD-10-CM

## 2024-08-14 DIAGNOSIS — F33.41 RECURRENT MAJOR DEPRESSIVE DISORDER, IN PARTIAL REMISSION: ICD-10-CM

## 2024-08-14 DIAGNOSIS — J30.2 SEASONAL ALLERGIC RHINITIS, UNSPECIFIED TRIGGER: ICD-10-CM

## 2024-08-14 DIAGNOSIS — J30.2 SEASONAL ALLERGIC RHINITIS, UNSPECIFIED TRIGGER: Primary | ICD-10-CM

## 2024-08-14 DIAGNOSIS — G47.00 INSOMNIA, UNSPECIFIED TYPE: ICD-10-CM

## 2024-08-14 LAB
BILIRUB UR QL STRIP: NEGATIVE
CLARITY UR REFRACT.AUTO: CLEAR
COLOR UR AUTO: YELLOW
GLUCOSE UR QL STRIP: NEGATIVE
HGB UR QL STRIP: NEGATIVE
KETONES UR QL STRIP: NEGATIVE
LEUKOCYTE ESTERASE UR QL STRIP: NEGATIVE
NITRITE UR QL STRIP: NEGATIVE
PH UR STRIP: 6 [PH] (ref 5–8)
PROT UR QL STRIP: NEGATIVE
SP GR UR STRIP: 1.02 (ref 1–1.03)
URN SPEC COLLECT METH UR: NORMAL

## 2024-08-14 PROCEDURE — 99395 PREV VISIT EST AGE 18-39: CPT | Mod: S$GLB,,, | Performed by: FAMILY MEDICINE

## 2024-08-14 PROCEDURE — 1159F MED LIST DOCD IN RCRD: CPT | Mod: CPTII,S$GLB,, | Performed by: FAMILY MEDICINE

## 2024-08-14 PROCEDURE — 1159F MED LIST DOCD IN RCRD: CPT | Mod: CPTII,S$GLB,, | Performed by: NURSE PRACTITIONER

## 2024-08-14 PROCEDURE — 3044F HG A1C LEVEL LT 7.0%: CPT | Mod: CPTII,S$GLB,, | Performed by: NURSE PRACTITIONER

## 2024-08-14 PROCEDURE — 3008F BODY MASS INDEX DOCD: CPT | Mod: CPTII,S$GLB,, | Performed by: NURSE PRACTITIONER

## 2024-08-14 PROCEDURE — 87086 URINE CULTURE/COLONY COUNT: CPT | Performed by: FAMILY MEDICINE

## 2024-08-14 PROCEDURE — 3074F SYST BP LT 130 MM HG: CPT | Mod: CPTII,S$GLB,, | Performed by: FAMILY MEDICINE

## 2024-08-14 PROCEDURE — 3075F SYST BP GE 130 - 139MM HG: CPT | Mod: CPTII,S$GLB,, | Performed by: NURSE PRACTITIONER

## 2024-08-14 PROCEDURE — 3044F HG A1C LEVEL LT 7.0%: CPT | Mod: CPTII,S$GLB,, | Performed by: FAMILY MEDICINE

## 2024-08-14 PROCEDURE — 3078F DIAST BP <80 MM HG: CPT | Mod: CPTII,S$GLB,, | Performed by: NURSE PRACTITIONER

## 2024-08-14 PROCEDURE — 99213 OFFICE O/P EST LOW 20 MIN: CPT | Mod: S$GLB,,, | Performed by: NURSE PRACTITIONER

## 2024-08-14 PROCEDURE — 3078F DIAST BP <80 MM HG: CPT | Mod: CPTII,S$GLB,, | Performed by: FAMILY MEDICINE

## 2024-08-14 PROCEDURE — 81003 URINALYSIS AUTO W/O SCOPE: CPT | Performed by: FAMILY MEDICINE

## 2024-08-14 PROCEDURE — 99999 PR PBB SHADOW E&M-EST. PATIENT-LVL IV: CPT | Mod: PBBFAC,,, | Performed by: FAMILY MEDICINE

## 2024-08-14 PROCEDURE — 3008F BODY MASS INDEX DOCD: CPT | Mod: CPTII,S$GLB,, | Performed by: FAMILY MEDICINE

## 2024-08-14 RX ORDER — BUPROPION HYDROCHLORIDE 300 MG/1
300 TABLET ORAL DAILY
Qty: 90 TABLET | Refills: 1 | Status: SHIPPED | OUTPATIENT
Start: 2024-08-14 | End: 2025-08-14

## 2024-08-14 RX ORDER — AZELASTINE 1 MG/ML
1 SPRAY, METERED NASAL 2 TIMES DAILY
Qty: 30 ML | Refills: 3 | Status: SHIPPED | OUTPATIENT
Start: 2024-08-14 | End: 2025-08-14

## 2024-08-14 RX ORDER — TRAZODONE HYDROCHLORIDE 50 MG/1
50 TABLET ORAL NIGHTLY
Qty: 90 TABLET | Refills: 1 | Status: SHIPPED | OUTPATIENT
Start: 2024-08-14 | End: 2025-08-14

## 2024-08-14 RX ORDER — FLUTICASONE PROPIONATE 50 MCG
1 SPRAY, SUSPENSION (ML) NASAL 2 TIMES DAILY
Qty: 18.2 ML | Refills: 3 | Status: SHIPPED | OUTPATIENT
Start: 2024-08-14

## 2024-08-14 NOTE — PATIENT INSTRUCTIONS
Continue wellbutrin  Continue trazodone  Can try to double dose if needed  Continue nasal sprays    Nocturia and dyspareunia, pelvic floor PT  Consider urogynecology?    Continue weight loss in a healthy manner  Increase exercise    Increase water intake.     F/u 6 months.    Refilled Norco for cervical radicular pain. Controlled substances monitoring: possible medication side effects, risk of tolerance and/or dependence, and alternative treatments discussed, no signs of potential drug abuse or diversion identified and [place] report reviewed today- activity consistent with treatment plan.

## 2024-08-14 NOTE — PROGRESS NOTES
OTOLARYNGOLOGY CLINIC NOTE  Date:  08/14/2024     Chief complaint:  Chief Complaint   Patient presents with    Allergic Rhinitis      2 months follow up     History of Present Illness  Christianne Faria is a 34 y.o. female  presenting today for allergic rhinitis f/u.  Pt reports it took about 2 months but she is feeling much better.  Pt reports once she was better she stopped her nasal sprays to see if they worked.  Pt reports she has been doing fine since then.  Pt reports she continues to have the irritation deep in her throat which causes a sharp pain.  Pt reports the pain occurs about every 2 weeks.  Pt reports she has been documenting when she feels it.      Notes from previous visit on 05/27/2024:  Christianne Faria is a 33 y.o. female  presenting today for a new evaluation and treatment of upper respiratory symptoms.  Pt reports for several months feeling like something is sticking her in the back of her throat.  Pt reports its in the upper part which she thinks is her sinuses.  Pt reports it hurts when she feels it.  Pt reports having nasal congestion and post nasal drip.  Pt denies any rhinitis.  Pt reports having occasional nose bleeds when her nose feels dry.  Pt rpeorts she has been using Flonase by it self.  Pt denies using any saline.  Pt denies any allergy testing, history of sinus surgery or nasal fractures.      Review of medical records and prior documentation  Past medical records were reviewed with data pertinent to the chief complaint summarized in the HPI. Information obtained from review of medical records is attributed to respective sources in the HPI with reference to sources of information at their mention. Records reviewed included all recent notes from referring provider, primary care, and related subspecialty evaluations as available. This review of records was performed and additional data obtained to supplement history obtained from the patient and further inform medical decision making  involved in formulating a plan of care accounting for all history and treatment relevant to the issues addressed.    Past Medical History  Past Medical History:   Diagnosis Date    Anxiety     Depression     Hx of psychiatric care     Mild intermittent asthma     Obsessive-compulsive disorder       Past Surgical History  Past Surgical History:   Procedure Laterality Date    BUNIONECTOMY Right 2009      Medications  Current Outpatient Medications on File Prior to Visit   Medication Sig Dispense Refill    azelastine (ASTELIN) 137 mcg (0.1 %) nasal spray 1 spray (137 mcg total) by Nasal route 2 (two) times daily. 30 mL 3    buPROPion (WELLBUTRIN XL) 300 MG 24 hr tablet Take 1 tablet (300 mg total) by mouth once daily. 90 tablet 1    fluticasone propionate (FLONASE) 50 mcg/actuation nasal spray 1 spray (50 mcg total) by Each Nostril route 2 (two) times daily. 18.2 mL 3    multivitamin capsule Take 1 capsule by mouth once daily.      traZODone (DESYREL) 50 MG tablet Take 1 tablet (50 mg total) by mouth every evening. 90 tablet 1    azelastine (ASTELIN) 137 mcg (0.1 %) nasal spray 1 spray (137 mcg total) by Nasal route 2 (two) times daily. 30 mL 3    multivit-folic acid-zinc-vit C 400- mcg-mg-mg Cap Take 1 capsule by mouth once daily.      witch hazeL 50 % PadM Apply 1 each topically.       Current Facility-Administered Medications on File Prior to Visit   Medication Dose Route Frequency Provider Last Rate Last Admin    etonogestreL subdermal device 68 mg  68 mg Implant  Nadine Cruz PA-C   68 mg at 03/06/24 1000     Review of Systems  Review of Systems   Constitutional:  Positive for malaise/fatigue.   HENT:  Positive for hearing loss and sore throat.    Eyes:  Positive for photophobia.   Respiratory:  Positive for cough.    Cardiovascular: Negative.    Gastrointestinal: Negative.    Genitourinary: Negative.    Musculoskeletal:  Positive for neck pain.   Skin: Negative.    Neurological:  Positive for  "dizziness and headaches.   Psychiatric/Behavioral:  The patient is nervous/anxious.       Social History   reports that she has quit smoking. Her smoking use included vaping w/o nicotine. She has never used smokeless tobacco. She reports current alcohol use of about 1.0 standard drink of alcohol per week. She reports current drug use. Drug: Marijuana.     Family History  Family History   Problem Relation Name Age of Onset    Anxiety disorder Mother Vanessa     Breast cancer Mother Vanessa 67        5 months between diagnosis and passing    Glaucoma Mother Vanessa     Cancer Mother Vanessa         Breast cancer    Hypertension Father Elie     Depression Brother Angelo     Asthma Brother Angelo     Diabetes Sister Ivan Roman     Stroke Paternal Grandmother Yesenia         x 2, latest in 2019    Colon cancer Neg Hx      Ovarian cancer Neg Hx      Amblyopia Neg Hx      Blindness Neg Hx      Cataracts Neg Hx      Macular degeneration Neg Hx      Retinal detachment Neg Hx      Strabismus Neg Hx        Physical Exam   Vitals:    08/14/24 0818   BP: 138/76    Body mass index is 32.94 kg/m².  Weight: 95.4 kg (210 lb 5.1 oz)   Height: 5' 7" (170.2 cm)     GENERAL: no acute distress.  HEAD: normocephalic.   EYES: lids and lashes normal. No scleral icterus  EARS: external ear without lesion, normal pinna shape and position.  External auditory canal with normal cerumen, tympanic membrane fully visible, no perforation , no retraction. No middle ear effusion. Ossicles intact.   NOSE: external nose without significant bony abnormality  ORAL CAVITY/OROPHARYNX: tongue midline and mobile. Symmetric palate rise. Uvula midline.   NECK: trachea midline.   LYMPH NODES:No cervical lymphadenopathy.  RESPIRATORY: no stridor, no stertor. Voice normal. Respirations nonlabored.  NEURO: alert, responds to questions appropriately.   Cranial nerve exam as indicated in above sections and additionally showed facial movement symmetric with good " eye closure and symmetric smile.   PSYCH:mood appropriate    Imaging:  The patient does not have any pertinent and/or recent imaging of the head and neck.     Labs:  CBC  Recent Labs   Lab 07/13/22  1221 06/05/23  1320 08/12/24  0753   WBC 8.77 9.91 8.67   Hemoglobin 13.3 12.2 12.1   Hematocrit 39.8 39.5 38.5   MCV 83 86 86   Platelets 305 339 330     BMP  Recent Labs   Lab 07/13/22  1221 06/05/23  1320 08/12/24  0753   Glucose 102 65 L 76   Sodium 136 139 139   Potassium 4.7 4.1 4.3   Chloride 107 107 111 H   CO2 22 L 24 22 L   BUN 15 10 13   Creatinine 0.9 0.9 0.9   Calcium 9.3 9.3 8.9     Assessment  1. Seasonal allergic rhinitis, unspecified trigger    2. Throat irritation     Plan:  Pt advised to continue nasal spray regimen as needed.  Pt referred to Dr. Martinez for throat irritation.  Nothing was seen during assessment or laryngoscopy.  Pt referred for evaluation and treatment.    Discussed plan of care with patient in detail and all questions answered. Patient reported understanding of plan of care.

## 2024-08-14 NOTE — PROGRESS NOTES
Subjective:       Patient ID: Christianne Faria is a 34 y.o. female.    Chief Complaint: Annual Exam      Christianne Faria is a 34 y.o. female who presents today for an annual.      Diet/Exercise: weight down since 2022. Stable over last few visits. Had gained weight after pregnancy. Now appears to be more similar to baseline.      Labs: ordered previously, reviewed today.      Anxiety: Taking wellbutrin 300 mg. Didn't tolerate SSRI in the past. She is on trazodone. Getting 6 hours of sleep or so. She does sometimes report waking up once nightly. However she reports having to urinate nightly. At least once a night. No fluids 2 hours before bedtime. No pain with urination. No soda intake.     She doesn't know what has caused her night time urination. During the day time, she doesn't urinate. She does reports pain with sex. Reports mostly pain on insertion.     Anemia better, has stopped blood donation. Takes iron with [facility].      Mammogram: 40     PMHx: reviewed in EMR and updated  Meds: reviewed in EMR and updated  Shx: reviewed in EMR and updated  FMHx: reviewed in EMR and updated  Social: lives with her BF their daughter, and his parents. There are two cats and one dog at home. 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 Her mother passed away due to breast cancer in mid 2021. She passed away at 68.          Review of Systems   Constitutional:  Negative for chills and fever.   Respiratory:  Negative for shortness of breath.    Cardiovascular:  Negative for chest pain.   Genitourinary:         Nocturia   Skin:  Negative for rash.   Psychiatric/Behavioral:  Positive for sleep disturbance. Negative for dysphoric mood and suicidal ideas. The patient is not nervous/anxious.          Health Maintenance Due   Topic Date Due    COVID-19 Vaccine (4 - 2023-24 season) 09/01/2023     Immunization History   Administered Date(s) Administered    COVID-19 Vaccine 12/29/2021    COVID-19, MRNA, LN-S, PF (MODERNA  FULL 0.5 ML DOSE) 03/23/2021    DTaP 1991, 01/14/1992, 03/09/1992, 10/29/1992, 09/19/1996    HIB 01/14/1992, 03/09/1992, 09/01/1992, 10/29/1992    Hepatitis B, Pediatric/Adolescent 06/03/1999, 10/25/2006, 01/03/2007    IPV 1991, 01/14/1992, 10/29/1992, 09/19/1996    Influenza - Quadrivalent - PF *Preferred* (6 months and older) 10/16/2020, 12/20/2021    MMR 10/29/1992, 09/19/1996, 01/13/2022    Meningococcal Conjugate (MCV4P) 01/03/2007    Rho (D) Immune Globulin 10/20/2021    Rho (D) Immune Globulin - IM 01/14/2022    Tdap 10/25/2006, 10/20/2021    Varicella 06/03/1999, 02/11/2009       Results for orders placed or performed in visit on 08/12/24   CBC Auto Differential   Result Value Ref Range    WBC 8.67 3.90 - 12.70 K/uL    RBC 4.50 4.00 - 5.40 M/uL    Hemoglobin 12.1 12.0 - 16.0 g/dL    Hematocrit 38.5 37.0 - 48.5 %    MCV 86 82 - 98 fL    MCH 26.9 (L) 27.0 - 31.0 pg    MCHC 31.4 (L) 32.0 - 36.0 g/dL    RDW 13.4 11.5 - 14.5 %    Platelets 330 150 - 450 K/uL    MPV 10.9 9.2 - 12.9 fL    Immature Granulocytes 0.2 0.0 - 0.5 %    Gran # (ANC) 5.6 1.8 - 7.7 K/uL    Immature Grans (Abs) 0.02 0.00 - 0.04 K/uL    Lymph # 2.2 1.0 - 4.8 K/uL    Mono # 0.6 0.3 - 1.0 K/uL    Eos # 0.2 0.0 - 0.5 K/uL    Baso # 0.06 0.00 - 0.20 K/uL    nRBC 0 0 /100 WBC    Gran % 65.1 38.0 - 73.0 %    Lymph % 25.1 18.0 - 48.0 %    Mono % 6.9 4.0 - 15.0 %    Eosinophil % 2.0 0.0 - 8.0 %    Basophil % 0.7 0.0 - 1.9 %    Differential Method Automated    Comprehensive Metabolic Panel   Result Value Ref Range    Sodium 139 136 - 145 mmol/L    Potassium 4.3 3.5 - 5.1 mmol/L    Chloride 111 (H) 95 - 110 mmol/L    CO2 22 (L) 23 - 29 mmol/L    Glucose 76 70 - 110 mg/dL    BUN 13 6 - 20 mg/dL    Creatinine 0.9 0.5 - 1.4 mg/dL    Calcium 8.9 8.7 - 10.5 mg/dL    Total Protein 6.7 6.0 - 8.4 g/dL    Albumin 3.5 3.5 - 5.2 g/dL    Total Bilirubin 0.4 0.1 - 1.0 mg/dL    Alkaline Phosphatase 78 55 - 135 U/L    AST 14 10 - 40 U/L    ALT 10 10 - 44  "U/L    eGFR >60.0 >60 mL/min/1.73 m^2    Anion Gap 6 (L) 8 - 16 mmol/L   Hemoglobin A1C   Result Value Ref Range    Hemoglobin A1C 5.2 4.0 - 5.6 %    Estimated Avg Glucose 103 68 - 131 mg/dL   Lipid Panel   Result Value Ref Range    Cholesterol 129 120 - 199 mg/dL    Triglycerides 125 30 - 150 mg/dL    HDL 29 (L) 40 - 75 mg/dL    LDL Cholesterol 75.0 63.0 - 159.0 mg/dL    HDL/Cholesterol Ratio 22.5 20.0 - 50.0 %    Total Cholesterol/HDL Ratio 4.4 2.0 - 5.0    Non-HDL Cholesterol 100 mg/dL   TSH   Result Value Ref Range    TSH 1.176 0.400 - 4.000 uIU/mL       Objective:     Vitals:    08/14/24 1310   BP: 90/65   BP Location: Right arm   Patient Position: Sitting   BP Method: Large (Manual)   Pulse: 89   Temp: 98.2 °F (36.8 °C)   SpO2: 98%   Weight: 96 kg (211 lb 10.3 oz)   Height: 5' 7" (1.702 m)        Physical Exam  Vitals and nursing note reviewed.   Constitutional:       General: She is not in acute distress.     Appearance: She is obese. She is not ill-appearing, toxic-appearing or diaphoretic.   Cardiovascular:      Rate and Rhythm: Normal rate and regular rhythm.   Pulmonary:      Effort: Pulmonary effort is normal.      Breath sounds: Normal breath sounds.   Abdominal:      Palpations: Abdomen is soft.      Tenderness: There is no abdominal tenderness.   Musculoskeletal:         General: No swelling.   Neurological:      General: No focal deficit present.      Mental Status: She is alert.   Psychiatric:         Mood and Affect: Mood normal.         Behavior: Behavior normal.         Thought Content: Thought content normal.         Judgment: Judgment normal.         Assessment:       1. Encounter for routine history and physical exam in female    2. Insomnia, unspecified type    3. Recurrent major depressive disorder, in partial remission    4. Anxiety    5. Panic attack    6. BMI 33.0-33.9,adult    7. Nocturia    8. Seasonal allergic rhinitis, unspecified trigger    9. Dyspareunia in female        Plan:     "   Continue wellbutrin  Continue trazodone  Can try to double dose if needed  Continue nasal sprays    Nocturia and dyspareunia, pelvic floor PT  Consider urogynecology?    Continue weight loss in a healthy manner  Increase exercise    Increase water intake.     F/u 6 months.       Encounter for routine history and physical exam in female    Insomnia, unspecified type  -     traZODone (DESYREL) 50 MG tablet; Take 1 tablet (50 mg total) by mouth every evening.  Dispense: 90 tablet; Refill: 1    Recurrent major depressive disorder, in partial remission  -     buPROPion (WELLBUTRIN XL) 300 MG 24 hr tablet; Take 1 tablet (300 mg total) by mouth once daily.  Dispense: 90 tablet; Refill: 1    Anxiety  -     buPROPion (WELLBUTRIN XL) 300 MG 24 hr tablet; Take 1 tablet (300 mg total) by mouth once daily.  Dispense: 90 tablet; Refill: 1    Panic attack  -     buPROPion (WELLBUTRIN XL) 300 MG 24 hr tablet; Take 1 tablet (300 mg total) by mouth once daily.  Dispense: 90 tablet; Refill: 1    BMI 33.0-33.9,adult    Nocturia  -     Urine culture; Future; Expected date: 08/14/2024  -     Urinalysis; Future; Expected date: 08/14/2024  -     Ambulatory referral/consult to Physical/Occupational Therapy; Future; Expected date: 08/21/2024    Seasonal allergic rhinitis, unspecified trigger  -     azelastine (ASTELIN) 137 mcg (0.1 %) nasal spray; 1 spray (137 mcg total) by Nasal route 2 (two) times daily.  Dispense: 30 mL; Refill: 3  -     fluticasone propionate (FLONASE) 50 mcg/actuation nasal spray; 1 spray (50 mcg total) by Each Nostril route 2 (two) times daily.  Dispense: 18.2 mL; Refill: 3    Dyspareunia in female  -     Ambulatory referral/consult to Physical/Occupational Therapy; Future; Expected date: 08/21/2024

## 2024-08-16 LAB
BACTERIA UR CULT: NORMAL
BACTERIA UR CULT: NORMAL

## 2024-09-22 ENCOUNTER — PATIENT MESSAGE (OUTPATIENT)
Dept: FAMILY MEDICINE | Facility: CLINIC | Age: 34
End: 2024-09-22
Payer: COMMERCIAL

## 2024-09-22 DIAGNOSIS — G47.00 INSOMNIA, UNSPECIFIED TYPE: ICD-10-CM

## 2024-09-23 ENCOUNTER — PATIENT MESSAGE (OUTPATIENT)
Dept: FAMILY MEDICINE | Facility: CLINIC | Age: 34
End: 2024-09-23
Payer: COMMERCIAL

## 2024-09-25 RX ORDER — TRAZODONE HYDROCHLORIDE 100 MG/1
100 TABLET ORAL NIGHTLY
Qty: 90 TABLET | Refills: 1 | Status: SHIPPED | OUTPATIENT
Start: 2024-09-25 | End: 2025-09-25

## 2024-11-06 NOTE — PROGRESS NOTES
[facility]   Pelvic Health Physical Therapy Initial Evaluation      Date: 2024   Name: Christianne Faria  Clinic Number: [id number]    Therapy Diagnosis: No diagnosis found.  Referring Provider: Endy Draper DO    Referring Provider Orders: PT Eval and Treat  Medical Diagnosis from Referral: Nocturia [R35.1], Dyspareunia in female [N94.10]   Evaluation Date: 2024  Authorization Period Expiration: 2024  Plan of Care Expiration: 2025  Visit # / Visits authorized: 1/pending  FOTO: 1 (2024)    Precautions: standard    Time In: 15:45  Time Out: 16:30  Total Appointment Time (timed & untimed codes): 45 minutes    SUBJECTIVE     Date of onset: about 4 years ago  History of current condition: Christianne reports night time urination. During the day time, she doesn't urinate. She does reports pain with sex. Reports mostly pain on insertion.     OB/GYN HISTORY -  (vaginal, grade 2 perineal tear)    BLADDER HISTORY  Frequency of urination:   Day: <5x           Night: 1-2x  Difficulty initiating urine stream: No  Hover over toilet seats: No  Urine stream: interrupted, comes in waves and take a long time to empty (almost 10 minutes)  Complete emptying: Yes  Urinary Urgency: Yes  Bladder leakage: Yes - urge and stress  Frequency of incidents: multiple times per day, large volume  Voids are large in volume  Incontinence worse when stressed    BOWEL HISTORY  Frequency of bowel movements:  4-5x/week  Difficulty initiating BM: Yes (sometimes)  Quality/shape of BM: [place] Stool Chart 2 to start, then softer afterwards  Incomplete emptying? Yes  Fiber supplements, probiotics or laxative use? No  Colon leakage: No    SEXUAL/PELVIC PAIN HISTORY  Sexually active? Yes   Pain with vaginal exams, intercourse or tampon use? Yes - always been uncomfortable, better with lubricant, entire length of vagina, feels muscle clenching  Pain with wearing certain clothes, sitting on certain  surfaces? No    Pain:  Location: bilateral back and hips  Current: 1/10, worst: 2/10, best: 0/10   Description: aching  Aggravating Factors/Activities that cause symptoms: sitting  Easing Factors: standing, NSAIDs    Imaging: none pertinent to the pelvic floor    Prior Therapy: no prior pelvic floor therapy  Social History: lives in a house with steps to enter, with family  Current exercise: active job, morning stretches  Occupation: health   Prior Level of Function: chronic dyspareunia  Current Level of Function: pelvic floor symptoms (stress urinary incontinence, urge urinary incontinence, dyspareunia) that limit tolerance for ADLs and functional mobility    Types of fluid intake: 48-100oz water  Diet: unremarkable  Habitus: well developed, well nourished  Abuse/Neglect: yes     Patients goals: be able to unclench, not have to use incontinence products     Medical History: Christianne  has a past medical history of Anxiety, Depression, psychiatric care, Mild intermittent asthma, and Obsessive-compulsive disorder.     Surgical History: Christianne Faria  has a past surgical history that includes Bunionectomy (Right, ).    Medications: Christianne has a current medication list which includes the following prescription(s): azelastine, bupropion, fluticasone propionate, multivit-folic acid-zinc-vit c, multivitamin, and trazodone, and the following Facility-Administered Medications: etonogestrel.    Allergies: Review of patient's allergies indicates:  No Known Allergies     OBJECTIVE     See Electronic Medical Record under MEDIA for written consent provided 2024  Chaperone: declined    ORTHO SCREEN  Posture in sitting: {AMB PT PELVIC FLOOR ORTHO POSTURE SITTIN}  Posture in standing: {AMB PT PELVIC FLOOR ORTHO POSTURE STANDIN}  Squat: {sehrtsquat:99983}  Lumbar spine: ***  Palpation: ***  Standing load transfer: *** pelvic girdle stability with single-leg balance    Hip Strength 2024   R hip  flexion ***/5   R hip external rotation ***/5   L hip flexion ***/5   L hip external rotation ***/5     Hip Range of Motion 2024   R internal rotation WNL   L internal rotation WNL     ABDOMINAL WALL ASSESSMENT  Palpation: {AMB PT PELVIC FLOOR MUSCLE PALPATION:37232}  Pelvic Girdle Stability: Pt demonstrates {mildly/moderately/severely:70523} impaired ability to stabilize pelvis with Active Straight Leg Raise Test. Able to improve {Desc; slightly/moderately/markedly:85121} with verbal/manual cues for transverse abdominus activation.   Scarring: ***  Diastasis Recti: {PRESENT/ABSENT:62266}, {1,2,3,4:42266}-finger width, present with supine curl-up task  Motor Control: able to demonstrate appropriate transverse abdominis contraction on command    BREATHING MECHANICS ASSESSMENT   Thorax Assessment During Quiet Respiration: {AMB PT PELVIC FLOOR BREATHIN}  Thorax Assessment During Deep Respiration: {AMB PT PELVIC FLOOR BREATHIN}        Limitation/Restriction for FOTO Pelvic Floor Surveys    Therapist reviewed FOTO scores for Christianne Faria on 2024  FOTO documents entered into [employer] - see Media section.    Limitation Score:   ***% impairment     TREATMENT     Total Treatment time (time-based codes) separate from the evaluation: *** minutes     Therapeutic activities to improve functional performance for *** minutes -  [x] Education on pelvic floor anatomy, function, and assessment  [x] Education on normal void intervals and fluid intake  [x] Education on bladder irritants  [x] Education on basic constipation management  [x] Instruction on self-release to superficial PFM to reduce discomfort with initial penetration - pt able to return demonstrate independently after initial instruction  [x] Discussion of intercourse considerations for pelvic pain - lubricant, positioning, pillows, pelvic floor massage  [x] Instruction on urge suppression techniques  [x] Instruction on diaphragmatic breathing and  hip-opening stretches for pelvic floor relaxation  [x] Education on visual cues/mental imagery for pelvic floor relaxation  [x] Education on techniques to reduce post-void dribble  [x] Instruction on the Knack for reduction of stress urinary incontinence  [x] Instruction on log-roll technique for transfers to reduce strain on back/abdominal wall, reduce incidence of incontinence  [x] Instruction on pressure management to protect pelvic organ prolapse/prevent incontinence  [x] Education on voiding optimization - seated on toilet, no pushing, pelvic floor squeeze upon completion  [x] Education on bowel movement optimization - toilet stool, breath coordination, pelvic floor relaxation, abdominal wall bracing  [x] HEP building/HEP review    PATIENT EDUCATION AND HOME EXERCISES     Education provided: general anatomy/physiology of urinary & bowel system, benefits of treatment, and alternative methods of treatment were discussed with the patient. Additionally, Christianne was provided education on pt prognosis, PT plan of care, {sehrtpfeducation:28479}    Written Home Exercises provided: yes  Exercises were reviewed, and Christianne was able to demonstrate them prior to the end of the session. Christianne demonstrated good understanding of the education provided. See EMR under 'Patient Instructions' for exercises and education provided during session.    ASSESSMENT     Christianne is a 34 y.o. female referred to outpatient physical therapy with a medical diagnosis of Nocturia [R35.1], Dyspareunia in female [N94.10] and additional complaints of ***. Symptoms impair the patient's ability to perform ADLs and functional mobility, as well as remain continent.  No direct pelvic floor examination performed today, but pt presentation and subjective report suggest pelvic floor dysfunction, ***.  Pt presents with {AMB PT PELVIC FLOOR PROBLEM LIST:11455}.  Symptoms appear consistent with pelvic floor dysfunction, ***.   Pt will benefit from  {benefitlist:77677}    Patient prognosis is good  Christianne will benefit from skilled outpatient physical therapy to address the deficits stated above and in the chart below, provide patient/family education, and to maximize the patient's level of independence.     Plan of care discussed with patient: yes  Patient's spiritual, cultural and educational needs considered, and the patient is agreeable to the plan of care and goals as stated below:     Anticipated Barriers for therapy: none    Medical Necessity is demonstrated by the following -  History  Co-morbidities and personal factors that may impact the plan of care [x] LOW: no personal factors / co-morbidities  [] MODERATE: 1-2 personal factors / co-morbidities  [] HIGH: 3+ personal factors / co-morbidities    Moderate / High Support Documentation:   Co-morbidities affecting plan of care: ***    Personal Factors:   {Personal Factors:75095}     Examination  Body Structures and Functions, activity limitations and participation restrictions that may impact the plan of care [x] LOW: addressing 1-2 elements  [] MODERATE: 3+ elements  [] HIGH: 4+ elements (please support below)    Moderate / High Support Documentation: ***     Clinical Presentation [x] LOW: stable  [] MODERATE: Evolving  [] HIGH: Unstable     Decision Making/ Complexity Score: {Desc; low/moderate/high:112810}       Short Term Goals: 6 weeks   {STG Urinary:27319}   {STG Urinary:28701}   {STG Urinary:05832}   Pt to be independent with introductory home exercise program     Long Term Goals: 12 weeks   {LTG Urinary:78408}   {LTG Urinary:26526}   {LTG Urinary:05606}   Pt to score no more than ***% impairment on the *** FOTO survey to demonstrate reduced impairment due to pelvic floor dysfunction   Pt to be independent with advanced home exercise program      PLAN     Plan of Care certification: 11/8/2024 to 2/8/2025    Outpatient Physical Therapy - 1-2 times per week for 12 weeks to include the following  interventions: Therapeutic Exercise, Manual Therapy, Therapeutic Activity, Neuromuscular Re-education, Electrical Stimulation Unattended, Self-Care, Patient Education      Nadine Oreilly, PT, DPT  Board-Certified Clinical Specialist in Women's Health Physical Therapy          I CERTIFY THE NEED FOR THESE SERVICES FURNISHED UNDER THIS PLAN OF TREATMENT AND WHILE UNDER MY CARE   Physician's comments:     Physician's Signature: ___________________________________________________

## 2024-11-08 ENCOUNTER — CLINICAL SUPPORT (OUTPATIENT)
Dept: REHABILITATION | Facility: OTHER | Age: 34
End: 2024-11-08
Payer: COMMERCIAL

## 2024-11-08 DIAGNOSIS — M62.89 PELVIC FLOOR DYSFUNCTION: Primary | ICD-10-CM

## 2024-11-08 DIAGNOSIS — N94.10 DYSPAREUNIA IN FEMALE: ICD-10-CM

## 2024-11-08 DIAGNOSIS — R35.1 NOCTURIA: ICD-10-CM

## 2024-11-08 DIAGNOSIS — R27.8 COORDINATION IMPAIRMENT: ICD-10-CM

## 2024-11-08 PROCEDURE — 97112 NEUROMUSCULAR REEDUCATION: CPT | Mod: PN | Performed by: PHYSICAL THERAPIST

## 2024-11-08 PROCEDURE — 97162 PT EVAL MOD COMPLEX 30 MIN: CPT | Mod: PN | Performed by: PHYSICAL THERAPIST

## 2024-11-08 PROCEDURE — 97530 THERAPEUTIC ACTIVITIES: CPT | Mod: PN | Performed by: PHYSICAL THERAPIST

## 2024-11-10 PROBLEM — M62.89 PELVIC FLOOR DYSFUNCTION: Status: ACTIVE | Noted: 2024-11-10

## 2024-11-10 PROBLEM — R27.8 COORDINATION IMPAIRMENT: Status: ACTIVE | Noted: 2024-11-10

## 2024-11-10 PROBLEM — M54.2 CERVICALGIA: Status: RESOLVED | Noted: 2022-12-07 | Resolved: 2023-01-25

## 2024-11-10 NOTE — PATIENT INSTRUCTIONS
Pelvic floor muscle coordination drills, 2 sets of 10 daily. Gently squeeze the pelvic floor and then FULLY relax. Spend a few breaths focusing on relaxation before moving to the next contraction. Think flaring the pelvic floor like a trumpet or feeling the pelvic floor drop.     Reducing incontinence with the Knack  The Knack is a strong and well-timed contraction of the pelvic floor muscles performed immediately before and during an increase in downward pressure on the pelvic floor.     Bladder leaks can be controlled using this exercise, which helps to close the urine tube more effectively. When the pelvic floor muscles are working as they should, they contract automatically before and during an increase in pressure from within the abdomen, such as during a cough or sneeze. When the muscles are not working appropriately, this action is not automatic, but it can improve with practice.    The Knack can be used with events or activities that increase downward pressure upon your pelvic floor such as:    Coughing  Sneezing  Lifting  Blowing your nose  Rising into standing from sitting  Stepping down heavily    When you feel like you're about to cough/sneeze/lift...  Lift and squeeze the muscles in and around all three pelvic openings (urethra, vagina, and anus) immediately before  Maintain this pelvic floor muscle contraction as cough/sneeze/lift  Afterwards, relax your pelvic floor muscles back to normal resting level    If the pelvic floor is not very strong or has been working hard all day (lots of lifting, excessive coughing/sneezing while sick), then you may still experience some leakage. Just do your best, and it should improve as the pelvic floor gets stronger.    Simply remember to lift and squeeze with every cough, lift or sneeze!     Bowel Movement Body Mechanics  1. Sit on the toilet comfortably with legs and buttocks relaxed.  2. Put your feet on a step stool or squatty potty (~8 inches tall).  3.  Lean forward while keeping your back straight and rest your elbows on your knees.  4. Keep your knees apart and place your hands on your belly  5. Inhale and make your belly big  6. Make the belly hard as you exhale like you are blowing out birthday candles while you gently bear down.   *Press the hands over the lower belly to prevent it from pushing out with bearing down  *Do not strain, do not hold your breath.

## 2024-11-10 NOTE — PLAN OF CARE
[facility]   Pelvic Health Physical Therapy Initial Evaluation      Date: 2024   Name: Christianne Faria  Clinic Number: [id number]    Therapy Diagnosis:   Encounter Diagnoses   Name Primary?    Pelvic floor dysfunction Yes    Coordination impairment      Referring Provider: Endy Draper DO    Referring Provider Orders: PT Eval and Treat  Medical Diagnosis from Referral: Nocturia [R35.1], Dyspareunia in female [N94.10]   Evaluation Date: 2024  Authorization Period Expiration: 2024  Plan of Care Expiration: 2025  Visit # / Visits authorized: 1/pending  FOTO: 1 (2024)    Precautions: standard    Time In: 15:45  Time Out: 16:40  Total Appointment Time (timed & untimed codes): 55 minutes    SUBJECTIVE     Date of onset: about 4 years ago  History of current condition: Christianne reports dyspareunia, reduced daytime urinary frequency, increased nighttime urinary frequency, stress urinary incontinence, and urge urinary incontinence.     OB/GYN HISTORY -  (vaginal, grade 2 perineal tear)    BLADDER HISTORY  Frequency of urination:   Day: <5x           Night: 1-2x  Difficulty initiating urine stream: No  Hover over toilet seats: No  Urine stream: interrupted, comes in waves and take a long time to empty (almost 10 minutes)  Complete emptying: Yes  Urinary Urgency: Yes  Bladder leakage: Yes - urge and stress  Frequency of incidents: multiple times per day, large volume  Voids are large in volume  Incontinence worse when stressed    BOWEL HISTORY  Frequency of bowel movements: 4-5x/week  Difficulty initiating BM: Yes (sometimes)  Quality/shape of BM: [place] Stool Chart 2 to start, then softer afterwards  Incomplete emptying? Yes  Fiber supplements, probiotics or laxative use? No  Colon leakage: No    SEXUAL/PELVIC PAIN HISTORY  Sexually active? Yes   Pain with vaginal exams, intercourse or tampon use? Yes - always been uncomfortable, better with lubricant, entire  length of vagina, feels muscle clenching  Pain with wearing certain clothes, sitting on certain surfaces? No    Pain:  Location: bilateral back and hips  Current: 1/10, worst: 2/10, best: 0/10   Description: aching  Aggravating Factors/Activities that cause symptoms: sitting  Easing Factors: standing, NSAIDs    Imaging: none pertinent to the pelvic floor    Prior Therapy: no prior pelvic floor therapy  Social History: lives in a house with steps to enter, with family  Current exercise: active job, morning stretches  Occupation: health   Prior Level of Function: chronic dyspareunia  Current Level of Function: pelvic floor symptoms (stress urinary incontinence, urge urinary incontinence, dyspareunia) that limit tolerance for ADLs and functional mobility    Types of fluid intake: 48-100oz water  Diet: unremarkable  Habitus: well developed, well nourished  Abuse/Neglect: yes     Patients goals: be able to unclench, not have to use incontinence products     Medical History: Christianne has a past medical history of Anxiety, Depression, psychiatric care, Mild intermittent asthma, and Obsessive-compulsive disorder.     Surgical History: Christianne Faria has a past surgical history that includes Bunionectomy (Right, 2009).    Medications: Christianne has a current medication list which includes the following prescription(s): azelastine, bupropion, fluticasone propionate, multivit-folic acid-zinc-vit c, multivitamin, and trazodone, and the following Facility-Administered Medications: etonogestrel.    Allergies: Review of patient's allergies indicates:  No Known Allergies     OBJECTIVE     See Electronic Medical Record under MEDIA for written consent provided 11/8/2024  Chaperone: declined    Hip Strength 11/8/2024   R hip flexion 4+/5   R hip external rotation 4/5   L hip flexion 4+/5   L hip external rotation 4/5     Hip Range of Motion 11/8/2024   R internal rotation WNL   L internal rotation deficient     ABDOMINAL WALL  ASSESSMENT  Palpation: no tenderness  Pelvic Girdle Stability: Pt demonstrates moderately impaired ability to stabilize pelvis with Active Straight Leg Raise Test.   Diastasis Recti: present, 1-finger width, present with supine curl-up task    VAGINAL PELVIC FLOOR EXAM - external assessment  Introitus: WNL  Skin condition: WNL  Scarring: none   Sensation: WNL   Voluntary contraction: visible lift  Voluntary relaxation: visible drop  Bearing down: bulge     VAGINAL PELVIC FLOOR EXAM - internal assessment  Pain: tender areas noted as follows: bilateral layer 3 (R > L) + urgency with palpation of layer 2   Sensation: able to localized pressure appropriately   Vaginal vault: WNL   Muscle Bulk: mild increased tone  Muscle Power: 3/5  Muscle Endurance: <5 seconds    Quality of contraction: slow rise, decreased hold, and incomplete relaxation   Specificity: WNL   Prolapse check: grade 1, anterior  Comments: paradoxical contraction with bearing down    Limitation/Restriction for FOTO Pelvic Floor Surveys    Therapist reviewed FOTO scores for Christianne Faria on 11/8/2024  FOTO documents entered into [employer] - see Media section.    Limitation Score:        TREATMENT     Total Treatment time (time-based codes) separate from the evaluation: 20 minutes     Therapeutic activities to improve functional performance for 12 minutes -  [x] Education on pelvic floor anatomy, function, and assessment  [x] Education on normal void intervals and fluid intake  [x] Instruction on urge suppression techniques  [x] Education on visual cues/mental imagery for pelvic floor relaxation  [x] Instruction on the Knack for reduction of stress urinary incontinence  [x] Education on voiding optimization - seated on toilet, no pushing  [x] Education on bowel movement optimization - pelvic floor relaxation, abdominal wall bracing  [x] HEP building/HEP review    Neuromuscular re-education activities to develop balance, coordination, kinesthetic sense, motor  control, proprioception, and posture for 8 minutes -   [x] PFM relaxation with verbal and intravaginal tactile cues  [x] PFM squeeze with good relaxation between reps, x5 - verbal and intravaginal tactile cues provided  [x] Bearing down without paradoxical contraction - verbal and intravaginal tactile cues provided    PATIENT EDUCATION AND HOME EXERCISES     Education provided: general anatomy/physiology of urinary & bowel system, benefits of treatment, and alternative methods of treatment were discussed with the patient. Additionally, Christianne was provided education on pt prognosis, PT plan of care, pelvic floor anatomy & function, consequences of elevated pelvic floor muscle tension, relationship between TrA & PFM, relationship between hips & PFM, urge suppression, etiology of urinary urgency, etiology of stress urinary incontinence, the knack for management of stress urinary incontinence, intercourse considerations for pelvic pain, and impact of stress/trauma on the pelvic floor    Written Home Exercises provided: yes  Exercises were reviewed, and Christianne was able to demonstrate them prior to the end of the session. Christianne demonstrated good understanding of the education provided. See EMR under 'Patient Instructions' for exercises and education provided during session.    ASSESSMENT     Christianne is a 34 y.o. female referred to outpatient physical therapy with a medical diagnosis of Nocturia [R35.1], Dyspareunia in female [N94.10] and additional complaints of urge urinary incontinence, stress urinary incontinence. Symptoms impair the patient's ability to perform ADLs and functional mobility, as well as remain continent.  Pt presents with poor trunk stability, pelvic floor tenderness, decreased pelvic muscle strength, decreased endurance of the pelvic muscles, increased tension of the pelvic muscles, increased nocturia, and poor coordination of pelvic floor muscles during ADL's leading to urinary or fecal  leakage.  Symptoms appear consistent with pelvic floor dysfunction. Pt will benefit from pelvic floor muscle training, bladder habit retraining, core/hip strengthening, patient education, manual therapy interventions, and functional retraining    Patient prognosis is good  Christianne will benefit from skilled outpatient physical therapy to address the deficits stated above and in the chart below, provide patient/family education, and to maximize the patient's level of independence.     Plan of care discussed with patient: yes  Patient's spiritual, cultural and educational needs considered, and the patient is agreeable to the plan of care and goals as stated below:     Anticipated Barriers for therapy: none    Medical Necessity is demonstrated by the following -  History  Co-morbidities and personal factors that may impact the plan of care [] LOW: no personal factors / co-morbidities  [x] MODERATE: 1-2 personal factors / co-morbidities  [] HIGH: 3+ personal factors / co-morbidities    Moderate / High Support Documentation:   Co-morbidities affecting plan of care: anxiety, depression, OCD, history of vaginal delivery    Personal Factors:   no deficits     Examination  Body Structures and Functions, activity limitations and participation restrictions that may impact the plan of care [] LOW: addressing 1-2 elements  [x] MODERATE: 3+ elements  [] HIGH: 4+ elements (please support below)    Moderate / High Support Documentation: pelvic floor weakness, pelvic floor incoordination, hip muscle weakness, pelvic floor tension/tenderness     Clinical Presentation [] LOW: stable  [x] MODERATE: Evolving  [] HIGH: Unstable     Decision Making/ Complexity Score: moderate       Short Term Goals: 6 weeks   Pt to report >50% improvement in urine leakage   Pt to verbalize urge suppression strategies for improved control over urgency and urge urinary incontinence   Pt to report >50% improvement in discomfort with vaginal penetration to  increase tolerance for intercourse and gynecological exams, as well as indicate reduced myofascial dysfunction   Pt to be independent with introductory home exercise program     Long Term Goals: 12 weeks   Pt to deny urinary incontinence to demonstrate improved pelvic floor coordination   Pt to report >90% improvement in discomfort with vaginal penetration to increase tolerance for intercourse and gynecological exams, as well as indicate reduced myofascial dysfunction   Pt to report minimal/no tenderness to palpation of the pelvic floor to indicate reduced myofascial dysfunction   Pt to score at least 70% impairment on the Urinary Problem FOTO survey to demonstrate reduced impairment due to pelvic floor dysfunction   Pt to be independent with advanced home exercise program      PLAN     Plan of Care certification: 11/8/2024 to 2/8/2025    Outpatient Physical Therapy - 1-2 times per week for 12 weeks to include the following interventions: Therapeutic Exercise, Manual Therapy, Therapeutic Activity, Neuromuscular Re-education, Electrical Stimulation Unattended, Self-Care, Patient Education      Nadine Oreilly, PT, DPT  Board-Certified Clinical Specialist in Women's Health Physical Therapy

## 2024-11-14 ENCOUNTER — PATIENT MESSAGE (OUTPATIENT)
Dept: OBSTETRICS AND GYNECOLOGY | Facility: CLINIC | Age: 34
End: 2024-11-14
Payer: COMMERCIAL

## 2024-11-14 DIAGNOSIS — N92.1 BREAKTHROUGH BLEEDING ON NEXPLANON: Primary | ICD-10-CM

## 2024-11-14 DIAGNOSIS — Z97.5 BREAKTHROUGH BLEEDING ON NEXPLANON: Primary | ICD-10-CM

## 2024-11-14 RX ORDER — LEVONORGESTREL AND ETHINYL ESTRADIOL 0.1-0.02MG
1 KIT ORAL DAILY
Qty: 60 TABLET | Refills: 0 | Status: SHIPPED | OUTPATIENT
Start: 2024-11-14 | End: 2024-11-14

## 2024-11-14 RX ORDER — NORELGESTROMIN AND ETHINYL ESTRADIOL 150; 35 UG/D; UG/D
1 PATCH TRANSDERMAL WEEKLY
Qty: 4 PATCH | Refills: 1 | Status: SHIPPED | OUTPATIENT
Start: 2024-11-14 | End: 2025-11-14

## 2024-12-06 ENCOUNTER — OFFICE VISIT (OUTPATIENT)
Dept: OTOLARYNGOLOGY | Facility: CLINIC | Age: 34
End: 2024-12-06
Payer: COMMERCIAL

## 2024-12-06 ENCOUNTER — LAB VISIT (OUTPATIENT)
Dept: LAB | Facility: HOSPITAL | Age: 34
End: 2024-12-06
Attending: OTOLARYNGOLOGY
Payer: COMMERCIAL

## 2024-12-06 VITALS
SYSTOLIC BLOOD PRESSURE: 104 MMHG | WEIGHT: 211.63 LBS | BODY MASS INDEX: 33.21 KG/M2 | HEIGHT: 67 IN | DIASTOLIC BLOOD PRESSURE: 62 MMHG

## 2024-12-06 DIAGNOSIS — M27.8 JAW MASS: ICD-10-CM

## 2024-12-06 DIAGNOSIS — M27.8 JAW MASS: Primary | ICD-10-CM

## 2024-12-06 DIAGNOSIS — J34.3 HYPERTROPHY OF INFERIOR NASAL TURBINATE: ICD-10-CM

## 2024-12-06 DIAGNOSIS — R07.0 THROAT PAIN IN ADULT: ICD-10-CM

## 2024-12-06 DIAGNOSIS — J30.2 SEASONAL ALLERGIC RHINITIS, UNSPECIFIED TRIGGER: ICD-10-CM

## 2024-12-06 LAB
CREAT SERPL-MCNC: 1 MG/DL (ref 0.5–1.4)
EST. GFR  (NO RACE VARIABLE): >60 ML/MIN/1.73 M^2

## 2024-12-06 PROCEDURE — 82565 ASSAY OF CREATININE: CPT | Performed by: OTOLARYNGOLOGY

## 2024-12-06 PROCEDURE — 31575 DIAGNOSTIC LARYNGOSCOPY: CPT | Mod: S$GLB,,, | Performed by: OTOLARYNGOLOGY

## 2024-12-06 PROCEDURE — 3008F BODY MASS INDEX DOCD: CPT | Mod: CPTII,S$GLB,, | Performed by: OTOLARYNGOLOGY

## 2024-12-06 PROCEDURE — 3078F DIAST BP <80 MM HG: CPT | Mod: CPTII,S$GLB,, | Performed by: OTOLARYNGOLOGY

## 2024-12-06 PROCEDURE — 36415 COLL VENOUS BLD VENIPUNCTURE: CPT | Performed by: OTOLARYNGOLOGY

## 2024-12-06 PROCEDURE — 3074F SYST BP LT 130 MM HG: CPT | Mod: CPTII,S$GLB,, | Performed by: OTOLARYNGOLOGY

## 2024-12-06 PROCEDURE — 3044F HG A1C LEVEL LT 7.0%: CPT | Mod: CPTII,S$GLB,, | Performed by: OTOLARYNGOLOGY

## 2024-12-06 PROCEDURE — 99214 OFFICE O/P EST MOD 30 MIN: CPT | Mod: 25,S$GLB,, | Performed by: OTOLARYNGOLOGY

## 2024-12-06 RX ORDER — NITROFURANTOIN 25; 75 MG/1; MG/1
100 CAPSULE ORAL 2 TIMES DAILY
COMMUNITY
Start: 2024-10-29

## 2024-12-06 RX ORDER — LEVONORGESTREL AND ETHINYL ESTRADIOL 0.1-0.02MG
1 KIT ORAL
COMMUNITY
Start: 2024-11-14

## 2024-12-06 RX ORDER — PHENAZOPYRIDINE HYDROCHLORIDE 100 MG/1
100 TABLET, FILM COATED ORAL
COMMUNITY
Start: 2024-10-29

## 2024-12-06 NOTE — PROGRESS NOTES
OTOLARYNGOLOGY CLINIC NOTE  Date:  12/06/2024     Chief complaint:  Chief Complaint   Patient presents with    Sore Throat     Sharp sensation in the back of throat that comes randomly comes       History of Present Illness  Christianne Faria is a 34 y.o. female  presenting today for a followup.    I have never seen the patient before. Saw LE ann   She has seen dr enriquez in 2021 had lesion in jaw and recommended for f/u mri 1 year ( this was in jan of 2021) . Does not appear to have had followup. She had ct and mri of this area in 2018 as well    Has pain in lower jaw and slightly under the chin.    Has throat pain higher  up as well  Does saline some does flonase and astelin once daily- no change to sensation in the throat   Pain on one side - top of palate ; feels something up there and then it comes out and goes down the throat with salt water gargle - feels like a chunk    Just started happening this year in the springtime  Gets one every 2 weeks has not been as frequent  No issue with heartburn      Sometimes gets cheek tenderness  Bilateral ear discomfort and muffled at times. Tinnitus less than a minute    Past Medical History  Past Medical History:   Diagnosis Date    Anxiety     Depression      of psychiatric care     Mild intermittent asthma     Obsessive-compulsive disorder         Past Surgical History  Past Surgical History:   Procedure Laterality Date    BUNIONECTOMY Right 2009        Medications  Current Outpatient Medications on File Prior to Visit   Medication Sig Dispense Refill    azelastine (ASTELIN) 137 mcg (0.1 %) nasal spray 1 spray (137 mcg total) by Nasal route 2 (two) times daily. 30 mL 3    buPROPion (WELLBUTRIN XL) 300 MG 24 hr tablet Take 1 tablet (300 mg total) by mouth once daily. 90 tablet 1    fluticasone propionate (FLONASE) 50 mcg/actuation nasal spray 1 spray (50 mcg total) by Each Nostril route 2 (two) times daily. 18.2 mL 3    levonorgest-eth.estradioL-iron (BALCOLTRA)  0.1 mg-0.02 mg (21)/iron (7) Tab Take 1 tablet by mouth.      multivit-folic acid-zinc-vit C 400- mcg-mg-mg Cap Take 1 capsule by mouth once daily.      multivitamin capsule Take 1 capsule by mouth once daily.      nitrofurantoin, macrocrystal-monohydrate, (MACROBID) 100 MG capsule Take 100 mg by mouth 2 (two) times daily.      phenazopyridine (PYRIDIUM) 100 MG tablet Take 100 mg by mouth.      traZODone (DESYREL) 100 MG tablet Take 1 tablet (100 mg total) by mouth every evening. 90 tablet 1    XULANE 150-35 mcg/24 hr Place 1 patch onto the skin once a week. 4 patch 1     Current Facility-Administered Medications on File Prior to Visit   Medication Dose Route Frequency Provider Last Rate Last Admin    etonogestreL subdermal device 68 mg  68 mg Implant  EstNadine betancur PA-C   68 mg at 03/06/24 1000       Review of Systems  Review of Systems   Constitutional: Negative.    HENT:  Positive for hearing loss and nosebleeds.    Respiratory:  Positive for cough.    Gastrointestinal:  Positive for constipation.   Musculoskeletal:  Positive for back pain and neck pain.   Skin: Negative.    Psychiatric/Behavioral:  The patient is nervous/anxious.         Answers submitted by the patient for this visit:  Review of Symptoms Questionnaire  (Submitted on 11/30/2024)  sinus pressure : Yes  Sinus infection(s)?: Yes  postnasal drip: Yes  trouble swallowing: Yes  eye itching: Yes  Foot swelling?: Yes  Light-headedness: Yes  sleep disturbance: Yes    Social History   reports that she has quit smoking. Her smoking use included vaping w/o nicotine. She has never used smokeless tobacco. She reports that she does not currently use alcohol after a past usage of about 1.0 standard drink of alcohol per week. She reports that she does not currently use drugs after having used the following drugs: Marijuana.     Family History  Family History   Problem Relation Name Age of Onset    Anxiety disorder Mother Vanessa     Breast cancer  "Mother Vanessa 67        5 months between diagnosis and passing    Glaucoma Mother Vanessa     Cancer Mother Vanessa         Breast cancer    Hypertension Father Elie     Depression Brother Angelo     Asthma Brother Angelo     Diabetes Sister Ivan Roman     Stroke Paternal Grandmother Yesenia         x 2, latest in 2019    Colon cancer Neg Hx      Ovarian cancer Neg Hx      Amblyopia Neg Hx      Blindness Neg Hx      Cataracts Neg Hx      Macular degeneration Neg Hx      Retinal detachment Neg Hx      Strabismus Neg Hx          Physical Exam   Vitals:    12/06/24 0835   BP: 104/62    Body mass index is 33.15 kg/m².  Weight: 96 kg (211 lb 10.3 oz)   Height: 5' 7" (170.2 cm)     GENERAL: no acute distress.  HEAD: normocephalic.   EYES: No scleral icterus  EARS: external ear without lesion, normal pinna shape and position.  External auditory canal with no cerumen, tympanic membrane fully visible, no perforation , no retraction. No middle ear effusion. Ossicles intact.   NOSE: external nose without significant bony abnormality  ORAL CAVITY/OROPHARYNX: tongue mobile. No palate lesion ; difficult tos ee tonsil - slightly endophytic  NECK: trachea midline.   LYMPH NODES:No cervical lymphadenopathy.  RESPIRATORY: no stridor, no stertor. Voice normal. Respirations nonlabored.  NEURO: alert, responds to questions appropriately.    PSYCH:mood appropriate    PROCEDURE NOTE  NAME OF PROCEDURE: Flexible Laryngoscopy, diagnostic  INDICATIONS: gag reflex precludes mirror exam, throat pain in adult   FINDINGS: no adenoid tissue really , cryptic 2+ tonsils no overt stones    Consent: After procedure was explained in detail and all questions answered, verbal consent was obtained for performing flexible laryngoscopy.  Anesthesia: topical 4% lidocaine and neosynephrine  Procedure: With patient in seated position, the scope was inserted into the bilateral nasal passageway and advanced atraumatically into the nasopharynx to examine " "the following structures:  Nasal cavity: Turbinates with mild hypertrophy. no middle meatal edema. Septal spur No purulent drainage.   Nasopharynx: no mass or lesion noted in nasopharynx.   Oropharynx: base of tongue without  mass or ulceration. Lingual tonsils normal in appearance  Hypopharynx: posterior pharyngeal wall without mass or lesion. No pooling of secretions. Pyriform sinuses visible without mass or lesion  Larynx: epiglottis normal without lesion. False vocal folds without edema/erythema/lesion. True vocal folds mobile and without lesion. Mild interarytenoid edema no erythema . Postcricoid region with mild edema no lesion   Subglottis: visualized portion of subglottis normal in appearance    After examination performed, the scope was removed atraumatically . The patient tolerated the procedure well. Photodocumentation obtained with representative images below, all images and/or videos uploaded in media section of epic.                                                                  Imaging:  The patient does not have any recent but does have pertinent imaging of the head and neck which was reviewed mri [facility] [place] images and report personally reviewed 6-2023"Note lucency at the level of the mandible on lateral radiograph 05/15/2018 not as well visualized on this limited MRI examination. "  Ct face and mri from 2018 also reviewed as patient new to me     Labs:  CBC  Recent Labs   Lab 07/13/22  1221 06/05/23  1320 08/12/24  0753   WBC 8.77 9.91 8.67   Hemoglobin 13.3 12.2 12.1   Hematocrit 39.8 39.5 38.5   MCV 83 86 86   Platelets 305 339 330     BMP  Recent Labs   Lab 07/13/22  1221 06/05/23  1320 08/12/24  0753   Glucose 102 65 L 76   Sodium 136 139 139   Potassium 4.7 4.1 4.3   Chloride 107 107 111 H   CO2 22 L 24 22 L   BUN 15 10 13   Creatinine 0.9 0.9 0.9   Calcium 9.3 9.3 8.9     COAGS        Assessment  1. Jaw mass  - MRI Soft Tissue Neck W W/O Contrast; Future  - CREATININE, SERUM; Future    2. Throat " pain in adult    3. Seasonal allergic rhinitis, unspecified trigger    4. Hypertrophy of inferior nasal turbinate       Plan:  Discussed plan of care with patient in detail and all questions answered. Patient reported understanding of plan of care. I gave the patient the opportunity to ask questions and patient confirmed all questions answered to satisfaction.     No stone on 2018 ct but just started with issue after that  May be adenolith or tonsil stone   Septum deviated on ct   Mri for follow up of jaw lesion- may need to see oral surgery  Counseled on how to do nasal spray regimen and saline prior    Virtual after mri   F/u 6 month re tonsil stones- discussed about tonsillectomy today        Please be aware that this note has been generated with the assistance of MModal voice-to-text.  Please excuse any spelling or grammatical errors.

## 2024-12-10 ENCOUNTER — TELEPHONE (OUTPATIENT)
Dept: OBSTETRICS AND GYNECOLOGY | Facility: CLINIC | Age: 34
End: 2024-12-10
Payer: COMMERCIAL

## 2024-12-10 NOTE — TELEPHONE ENCOUNTER
Pt received the patch, verified by pt that the patch was received..    ----- Message from Nadine Cruz PA-C sent at 12/10/2024 12:19 PM CST -----  Regarding: FW: Sridhar  Can you call pharmacy and see which meds she got?     She only needs the patch.  ----- Message -----  From: Rimma Razo  Sent: 12/10/2024  11:07 AM CST  To: [facility] Staff  Subject: Sridhar                                         Type: Patient Call Back    Who called: Torito with   [facility] [address] [place] [address] - [address]  [address] [place] [address]  Phone: [phone] Fax: [phone]      What is the request in detail: stated they received 2 forms of birthcontrol and would like to know if this was done intentionally due to typically the pts only use one or the other.

## 2024-12-16 NOTE — PROGRESS NOTES
"Pelvic Health Physical Therapy   Treatment Note     Name: Christianne Faria  Clinic Number: [id number]    Therapy Diagnosis:   Encounter Diagnoses   Name Primary?    Pelvic floor dysfunction Yes    Coordination impairment      Referring Provider: Endy Draper DO    Visit Date: 12/19/2024    Referring Provider Orders: PT Eval and Treat  Medical Diagnosis from Referral: Nocturia [R35.1], Dyspareunia in female [N94.10]   Evaluation Date: 11/8/2024  Authorization Period Expiration: 12/31/2024  Plan of Care Expiration: 2/8/2025  Visit # / Visits authorized: 2/pending  FOTO: 1 (11/8/2024)    Cancelled Visits: -  No Show Visits: -    Time In: 17:00  Time Out: 17:45  Total Billable Time: 45 minutes    Precautions: standard    Subjective     Christianne reports    She {Actions; was/was not:88694} compliant with home exercise program.  Response to previous treatment: no adverse effect  Functional change: none reported    Pain: {0-10:65439::"0"}/10    Objective     Christianne received the following interventions during the treatment session:   (TrA = transverse abdominis, PFM = pelvic floor muscle, sEMG = surface electromyography, RUSI = Rehabilitative Ultrasound Imaging)  Pt consented to pelvic floor muscle assessment and treatment on ***. See EMR.    Therapeutic activities to improve functional performance for *** minutes -  [x] Education on pelvic floor anatomy & function  [x] Pelvic floor assessment  [x] Instruction on self-release to superficial PFM to reduce discomfort with initial penetration - pt able to return demonstrate independently after initial instruction  [x] Discussion of intercourse considerations for pelvic pain - lubricant, positioning, pillows, pelvic floor massage  [x] Instruction on techniques to reduce excess pelvic floor tension - body scanning, timed check-ins, diaphragmatic breathing, stretching  [x] Instruction on urge suppression techniques  [x] Education on techniques to reduce post-void dribble  [x] " Instruction on the Knack for reduction of stress urinary incontinence  [x] Guided body scan meditation to reduce excess muscle tension  [x] Instruction on log-roll technique for transfers to reduce strain on back/abdominal wall, reduce incidence of incontinence  [x] Education on voiding optimization - seated on toilet, no pushing, pelvic floor squeeze upon completion  [x] Education on bowel movement optimization - positioning, toilet stool, breath coordination, pelvic floor relaxation, abdominal wall bracing  [x] HEP building/HEP review    Manual therapy techniques: to develop flexibility, desensitization, and extensibility for *** minutes -  [] Obturator release at ischial tuberosities + active release with resisted contralateral hip external rotation (B)  [] Manual release to pelvic floor muscles externally: levator ani, superficial transverse perineal, perineal body  [] Manual release to pelvic floor muscles internally/vaginally: levator ani, superficial transverse perineal, perineal body  [] Myofascial decompression/cupping to lower abdominal wall      Home Exercises and Patient Education     Patient Education: progression of plan of care, plan for next session, {sehrtpfeducation:[id number]}    Home Exercise Program:   Home Exercise Program Updates: ***    Exercises were reviewed, and Christianne was able to demonstrate them prior to the end of the session, as needed. Christianne demonstrated good understanding of the education provided.   See 'Patient Instructions' for exercises/instructions provided.    Assessment     ***    Christianne is progressing well towards her goals.   Pt prognosis: good    Pt will continue to benefit from skilled outpatient physical therapy to address the deficits listed in the problem list box on initial evaluation, provide pt/family education and to maximize pt's level of independence in the home and community environment.     Pt's spiritual, cultural and educational needs considered and pt  agreeable to plan of care and goals.  Anticipated barriers to physical therapy: none      Short Term Goals: 6 weeks   Pt to report >50% improvement in urine leakage   Pt to verbalize urge suppression strategies for improved control over urgency and urge urinary incontinence   Pt to report >50% improvement in discomfort with vaginal penetration to increase tolerance for intercourse and gynecological exams, as well as indicate reduced myofascial dysfunction   Pt to be independent with introductory home exercise program      Long Term Goals: 12 weeks   Pt to deny urinary incontinence to demonstrate improved pelvic floor coordination   Pt to report >90% improvement in discomfort with vaginal penetration to increase tolerance for intercourse and gynecological exams, as well as indicate reduced myofascial dysfunction   Pt to report minimal/no tenderness to palpation of the pelvic floor to indicate reduced myofascial dysfunction   Pt to score at least 70% impairment on the Urinary Problem FOTO survey to demonstrate reduced impairment due to pelvic floor dysfunction   Pt to be independent with advanced home exercise program      Plan     Continue per Plan of Care      Nadine Oreilly, PT, DPT    Board-Certified Clinical Specialist in Women's Health Physical Therapy

## 2024-12-19 ENCOUNTER — CLINICAL SUPPORT (OUTPATIENT)
Dept: REHABILITATION | Facility: OTHER | Age: 34
End: 2024-12-19
Payer: COMMERCIAL

## 2024-12-19 DIAGNOSIS — M62.89 PELVIC FLOOR DYSFUNCTION: Primary | ICD-10-CM

## 2024-12-19 DIAGNOSIS — R27.8 COORDINATION IMPAIRMENT: ICD-10-CM

## 2024-12-19 PROCEDURE — 97112 NEUROMUSCULAR REEDUCATION: CPT | Mod: PN | Performed by: PHYSICAL THERAPIST

## 2024-12-19 PROCEDURE — 97530 THERAPEUTIC ACTIVITIES: CPT | Mod: PN | Performed by: PHYSICAL THERAPIST

## 2024-12-19 PROCEDURE — 97110 THERAPEUTIC EXERCISES: CPT | Mod: PN | Performed by: PHYSICAL THERAPIST

## 2024-12-19 NOTE — PROGRESS NOTES
Pelvic Health Physical Therapy   Treatment Note     Name: Christianne Faria  Clinic Number: [id number]    Therapy Diagnosis:   Encounter Diagnoses   Name Primary?    Pelvic floor dysfunction Yes    Coordination impairment      Referring Provider: Endy Draper DO    Visit Date: 12/19/2024    Referring Provider Orders: PT Eval and Treat  Medical Diagnosis from Referral: Nocturia [R35.1], Dyspareunia in female [N94.10]   Evaluation Date: 11/8/2024  Authorization Period Expiration: 12/31/2024  Plan of Care Expiration: 2/8/2025  Visit # / Visits authorized: 2/12  FOTO: 1 (11/8/2024)    Cancelled Visits: -  No Show Visits: -    Time In: 17:00  Time Out: 17:45  Total Billable Time: 43 minutes    Precautions: standard    Subjective     Christianne reports only 1-2 episodes of urine leakage since last visit (much improved). She has practiced the knack, which has been 90% effective. Now voiding takes no more than 5 minutes, tougher in the mornings. [place] is less uncomfortable.    She was compliant with home exercise program.  Response to previous treatment: no adverse effect  Functional change: improved urinary continence, reduced difficulty with voiding, reduced dyspareunia    Pain: none reported    Objective     Christianne received the following interventions during the treatment session:   (TrA = transverse abdominis, PFM = pelvic floor muscle, sEMG = surface electromyography, RUSI = Rehabilitative Ultrasound Imaging)  Pt consented to pelvic floor muscle assessment and treatment in prior visit. See EMR.    Therapeutic activities to improve functional performance for 12 minutes -  [x] Education on pelvic floor anatomy & function  [x] Education on intercourse considerations for pelvic pain  [x] Instruction on techniques to reduce excess pelvic floor tension - body scanning, timed check-ins, diaphragmatic breathing, stretching  [x] Review of urge suppression techniques  [x] Review of the Knack for reduction of stress urinary  incontinence  [x] Education on voiding optimization - seated on toilet, no pushing, emphasis on pelvic floor relaxation  [x] Seated figure-4 stretch + diaphragmatic breathing and pelvic drop (R&L), 1 minute  [x] HEP building/HEP review    Neuromuscular re-education activities to develop balance, coordination, kinesthetic sense, motor control, proprioception, and posture for 23 minutes -   [x] TrA brace without oblique activation - able to demonstrate correctly with verbal and tactile cues  [x] Diaphragmatic breathing - able to demonstrate correctly with verbal and tactile cues  [x] TrA brace + PFM squeeze on exhale, x15  [x] TrA brace + PFM squeeze on exhale + bridging with belt around knees, x15  [x] TrA brace + PFM squeeze on exhale + straight leg raise (R&L), x12 with 3-second hold  [x] TrA brace + PFM squeeze on exhale + hooklying ball squeeze between knees, x15 with 3-second hold    Therapeutic exercises to develop strength, endurance and core stabilization for 8 minutes -  [x] Side-lying hip abduction (R&L), x15  [x] Side-lying clam with focus on pelvic girdle stability (R&L), x18    Home Exercises and Patient Education     Patient Education: progression of plan of care, plan for next session, pt prognosis, pelvic floor anatomy & function, consequences of elevated pelvic floor muscle tension, relationship between TrA & PFM, relationship between hips & PFM, urge suppression, etiology of urinary urgency, intercourse considerations for pelvic pain, and abdominal wall anatomy    Home Exercise Program (11/8/24): the catherine, urge suppression, pelvic floor coordination drills   Home Exercise Program Updates (12/19/24): side-lying clam, straight leg raise     Exercises were reviewed, and Christianne was able to demonstrate them prior to the end of the session, as needed. Christianne demonstrated good understanding of the education provided.   See 'Patient Instructions' for exercises/instructions provided.    Assessment     Pt  tolerated treatment session well, with good understanding of education provided and no increased symptoms with exercise. Pt requires heavy verbal/manual cues to synch breath, TrA contraction, PFM contraction, and movement during exercises, but she improved with practice. Improving core/hip strength will likely improve pelvic floor tension, and continued pelvic floor coordination training with exercise will aid in downtraining. Pt's functional mobility and ability to perform ADLs still limited by pelvic floor dysfunction. She requires skilled therapy for continued patient education and coordination retraining.      Christianne is progressing well towards her goals.   Pt prognosis: good    Pt will continue to benefit from skilled outpatient physical therapy to address the deficits listed in the problem list box on initial evaluation, provide pt/family education and to maximize pt's level of independence in the home and community environment.     Pt's spiritual, cultural and educational needs considered and pt agreeable to plan of care and goals.  Anticipated barriers to physical therapy: none      Short Term Goals: 6 weeks   Pt to report >50% improvement in urine leakage - MET  Pt to verbalize urge suppression strategies for improved control over urgency and urge urinary incontinence - MET  Pt to report >50% improvement in discomfort with vaginal penetration to increase tolerance for intercourse and gynecological exams, as well as indicate reduced myofascial dysfunction - PARTIALLY MET   Pt to be independent with introductory home exercise program - MET     Long Term Goals: 12 weeks   Pt to deny urinary incontinence to demonstrate improved pelvic floor coordination - NOT MET   Pt to report >90% improvement in discomfort with vaginal penetration to increase tolerance for intercourse and gynecological exams, as well as indicate reduced myofascial dysfunction - NOT MET    Pt to report minimal/no tenderness to palpation of  the pelvic floor to indicate reduced myofascial dysfunction - NOT MET    Pt to score at least 70% impairment on the Urinary Problem FOTO survey to demonstrate reduced impairment due to pelvic floor dysfunction - NOT MET    Pt to be independent with advanced home exercise program - NOT MET       Plan     Continue per Plan of Care      Nadine Oreilly, PT, DPT    Board-Certified Clinical Specialist in Women's Health Physical Therapy

## 2024-12-19 NOTE — PATIENT INSTRUCTIONS
Straight Leg Raise, 2-3 sets of 10  - Inhale to prepare, relax the belly and pelvic floor  - As you exhale, gently engage the transverse abdominis by drawing the belly button up and in to the spine  - Holding the hips level and the belly button down, gently lift one leg a few inches  - Inhale again to rest  *Don't hold your breath     Side-lying clam, 2-3 sets of 10  - Holding the hips level, lift the top leg a few inches.   *Hold the hips stacked on top of each other, not rolling back with movement  *Don't hold your breath  *Roll the top hip more forward than you think

## 2024-12-27 ENCOUNTER — HOSPITAL ENCOUNTER (OUTPATIENT)
Dept: RADIOLOGY | Facility: HOSPITAL | Age: 34
Discharge: HOME | End: 2024-12-27
Attending: OTOLARYNGOLOGY
Payer: COMMERCIAL

## 2024-12-27 DIAGNOSIS — M27.8 JAW MASS: ICD-10-CM

## 2024-12-27 PROCEDURE — A9585 GADOBUTROL INJECTION: HCPCS | Performed by: OTOLARYNGOLOGY

## 2024-12-27 PROCEDURE — 70543 MRI ORBT/FAC/NCK W/O &W/DYE: CPT | Mod: TC

## 2024-12-27 PROCEDURE — 25500020 PHARM REV CODE 255: Performed by: OTOLARYNGOLOGY

## 2024-12-27 PROCEDURE — 70543 MRI ORBT/FAC/NCK W/O &W/DYE: CPT | Mod: 26,,, | Performed by: RADIOLOGY

## 2024-12-27 RX ORDER — GADOBUTROL 604.72 MG/ML
10 INJECTION INTRAVENOUS
Status: COMPLETED | OUTPATIENT
Start: 2024-12-27 | End: 2024-12-27

## 2024-12-27 RX ADMIN — GADOBUTROL 10 ML: 604.72 INJECTION INTRAVENOUS at 08:12

## 2025-01-09 ENCOUNTER — OFFICE VISIT (OUTPATIENT)
Dept: OTOLARYNGOLOGY | Facility: CLINIC | Age: 35
End: 2025-01-09

## 2025-01-09 DIAGNOSIS — M27.8 JAW MASS: Primary | ICD-10-CM

## 2025-01-09 NOTE — PROGRESS NOTES
OTOLARYNGOLOGY CLINIC NOTE-VIRTUAL VISIT  Date:  01/09/2025     Chief complaint:  F/u mri results    History of Present Illness  Christianne Faria is a 34 y.o. female  presenting today for a follow up    The patient elected to have a virtual visit.The patient's virtual visit today was conducted via telemedicine (video visit)     The location of the patient for the virtual visit is : home     Past Medical History  Past Medical History:   Diagnosis Date    Anxiety     Depression     Hx of psychiatric care     Mild intermittent asthma     Obsessive-compulsive disorder         Past Surgical History  Past Surgical History:   Procedure Laterality Date    BUNIONECTOMY Right 2009        Medications  Current Outpatient Medications on File Prior to Visit   Medication Sig Dispense Refill    azelastine (ASTELIN) 137 mcg (0.1 %) nasal spray 1 spray (137 mcg total) by Nasal route 2 (two) times daily. 30 mL 3    buPROPion (WELLBUTRIN XL) 300 MG 24 hr tablet Take 1 tablet (300 mg total) by mouth once daily. 90 tablet 1    fluticasone propionate (FLONASE) 50 mcg/actuation nasal spray 1 spray (50 mcg total) by Each Nostril route 2 (two) times daily. 18.2 mL 3    levonorgest-eth.estradioL-iron (BALCOLTRA) 0.1 mg-0.02 mg (21)/iron (7) Tab Take 1 tablet by mouth.      multivit-folic acid-zinc-vit C 400- mcg-mg-mg Cap Take 1 capsule by mouth once daily.      multivitamin capsule Take 1 capsule by mouth once daily.      nitrofurantoin, macrocrystal-monohydrate, (MACROBID) 100 MG capsule Take 100 mg by mouth 2 (two) times daily.      phenazopyridine (PYRIDIUM) 100 MG tablet Take 100 mg by mouth.      traZODone (DESYREL) 100 MG tablet Take 1 tablet (100 mg total) by mouth every evening. 90 tablet 1     Current Facility-Administered Medications on File Prior to Visit   Medication Dose Route Frequency Provider Last Rate Last Admin    etonogestreL subdermal device 68 mg  68 mg Implant  Nadine Cruz PA-C   68 mg at 03/06/24 1000        Review of Systems  Review of Systems   Constitutional: Negative.    HENT:  Positive for hearing loss.    Respiratory: Negative.     Gastrointestinal:  Positive for constipation.   Musculoskeletal:  Positive for neck pain.   Skin: Negative.    Neurological: Negative.         Social History   reports that she has quit smoking. Her smoking use included vaping w/o nicotine. She has never used smokeless tobacco. She reports that she does not currently use alcohol after a past usage of about 1.0 standard drink of alcohol per week. She reports that she does not currently use drugs after having used the following drugs: Marijuana.     Family History  Family History   Problem Relation Name Age of Onset    Anxiety disorder Mother Vanessa     Breast cancer Mother Vanessa 67        5 months between diagnosis and passing    Glaucoma Mother Vanessa     Cancer Mother Vanessa         Breast cancer    Hypertension Father Elie     Depression Brother Angelo     Asthma Brother Angelo     Diabetes Sister Ivan Roman     Stroke Paternal Grandmother Yesenia         x 2, latest in 2019    Colon cancer Neg Hx      Ovarian cancer Neg Hx      Amblyopia Neg Hx      Blindness Neg Hx      Cataracts Neg Hx      Macular degeneration Neg Hx      Retinal detachment Neg Hx      Strabismus Neg Hx          Physical Exam -limited due to nature of virtual visit, unable to obtain vital signs    GENERAL: no acute distress.  HEAD: normocephalic.   EYES: No scleral icterus  EARS: external ear without obvious lesion, normal pinna shape and position.   NOSE: external nose without significant bony abnormality  ORAL CAVITY/OROPHARYNX: tongue  mobile.   NECK: trachea midline.   RESPIRATORY: no stridor, no stertor. Voice normal. Respirations nonlabored.  NEURO: alert, responds to questions appropriately.   Facial muscles symmetric at rest  PSYCH:mood appropriate      Imaging:  The patient does have any pertinent and/or recent imaging of the head and neck.  "  MRI  images and report personally reviewed and reviewed with patient. Radiology report in quotations below  " There is a similar appearance of the 18 mm in transverse dimension lesion within the right parasymphyseal mandible when compared to prior studies.  Punctate focal enhancement is again identified.  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     The salivary glands and thyroid gland are unremarkable.     No soft tissue mass or adenopathy.  Is identified in the neck.     The visualized sinuses and mastoid air cells are essentially clear with only trace left maxillary mucosal thickening.     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 Jaw mass       Plan:  Discussed plan of care with patient in detail and all questions answered. Patient reported understanding of plan of care.     Reviewed imaging with patient from recent and previous images    Offered to send to oral surgeon to get another opinion as suspect benign jaw lesion given no change in mri over many years. Discussed with her that I do not do the surgery for benign jaw tumor it is usually done by oral surgeon but from my experience do not think they would recommend surgery either. I also Do not think needs routine imaging at this point. She is aware of this and She does not want to see oral surgeon at " this time for second opinion . Will let me know if she would like second opinion and/or any changes she notices - reviewed signs to notify about previously     she no longer has symptoms that she had when I initially saw her    Can f/u prn   I spent a total of 30 minutes on the day of the visit.  This includes face to face time and non-face to face time preparing to see the patient (eg, review of tests), obtaining and/or reviewing separately obtained history, documenting clinical information in the electronic or other health record, independently interpreting results and communicating results to the patient/family/caregiver, or care coordinator.   Please be aware that this note has been generated with the assistance of [employer] voice-to-text.  Please excuse any spelling or grammatical errors.

## 2025-01-30 ENCOUNTER — PATIENT MESSAGE (OUTPATIENT)
Dept: FAMILY MEDICINE | Facility: CLINIC | Age: 35
End: 2025-01-30